# Patient Record
Sex: FEMALE | Race: ASIAN | NOT HISPANIC OR LATINO | URBAN - METROPOLITAN AREA
[De-identification: names, ages, dates, MRNs, and addresses within clinical notes are randomized per-mention and may not be internally consistent; named-entity substitution may affect disease eponyms.]

---

## 2018-04-05 ENCOUNTER — INPATIENT (INPATIENT)
Facility: HOSPITAL | Age: 68
LOS: 3 days | Discharge: HOME | DRG: 372 | End: 2018-04-09
Attending: SPECIALIST | Admitting: SPECIALIST
Payer: COMMERCIAL

## 2018-04-05 VITALS
RESPIRATION RATE: 16 BRPM | OXYGEN SATURATION: 97 % | WEIGHT: 145.06 LBS | SYSTOLIC BLOOD PRESSURE: 101 MMHG | TEMPERATURE: 98 F | HEART RATE: 84 BPM | DIASTOLIC BLOOD PRESSURE: 68 MMHG | HEIGHT: 62 IN

## 2018-04-05 DIAGNOSIS — Z90.49 ACQUIRED ABSENCE OF OTHER SPECIFIED PARTS OF DIGESTIVE TRACT: Chronic | ICD-10-CM

## 2018-04-05 DIAGNOSIS — K37 UNSPECIFIED APPENDICITIS: ICD-10-CM

## 2018-04-05 LAB
ALBUMIN SERPL ELPH-MCNC: 3.7 G/DL — SIGNIFICANT CHANGE UP (ref 3.5–5)
ALP SERPL-CCNC: 44 U/L — SIGNIFICANT CHANGE UP (ref 40–120)
ALT FLD-CCNC: 14 U/L DA — SIGNIFICANT CHANGE UP (ref 10–60)
ANION GAP SERPL CALC-SCNC: 8 MMOL/L — SIGNIFICANT CHANGE UP (ref 5–17)
APPEARANCE UR: CLEAR — SIGNIFICANT CHANGE UP
APTT BLD: 41.3 SEC — HIGH (ref 27.5–37.4)
AST SERPL-CCNC: 20 U/L — SIGNIFICANT CHANGE UP (ref 10–40)
BASOPHILS # BLD AUTO: 0.1 K/UL — SIGNIFICANT CHANGE UP (ref 0–0.2)
BASOPHILS NFR BLD AUTO: 1 % — SIGNIFICANT CHANGE UP (ref 0–2)
BILIRUB SERPL-MCNC: 0.4 MG/DL — SIGNIFICANT CHANGE UP (ref 0.2–1.2)
BILIRUB UR-MCNC: NEGATIVE — SIGNIFICANT CHANGE UP
BUN SERPL-MCNC: 34 MG/DL — HIGH (ref 7–18)
CALCIUM SERPL-MCNC: 10.3 MG/DL — SIGNIFICANT CHANGE UP (ref 8.4–10.5)
CHLORIDE SERPL-SCNC: 104 MMOL/L — SIGNIFICANT CHANGE UP (ref 96–108)
CO2 SERPL-SCNC: 24 MMOL/L — SIGNIFICANT CHANGE UP (ref 22–31)
COLOR SPEC: YELLOW — SIGNIFICANT CHANGE UP
CREAT SERPL-MCNC: 1.23 MG/DL — SIGNIFICANT CHANGE UP (ref 0.5–1.3)
DIFF PNL FLD: ABNORMAL
EOSINOPHIL # BLD AUTO: 0.1 K/UL — SIGNIFICANT CHANGE UP (ref 0–0.5)
EOSINOPHIL NFR BLD AUTO: 1.1 % — SIGNIFICANT CHANGE UP (ref 0–6)
GLUCOSE BLDC GLUCOMTR-MCNC: 131 MG/DL — HIGH (ref 70–99)
GLUCOSE BLDC GLUCOMTR-MCNC: 67 MG/DL — LOW (ref 70–99)
GLUCOSE BLDC GLUCOMTR-MCNC: 78 MG/DL — SIGNIFICANT CHANGE UP (ref 70–99)
GLUCOSE SERPL-MCNC: 90 MG/DL — SIGNIFICANT CHANGE UP (ref 70–99)
GLUCOSE UR QL: NEGATIVE — SIGNIFICANT CHANGE UP
HCT VFR BLD CALC: 39.7 % — SIGNIFICANT CHANGE UP (ref 34.5–45)
HGB BLD-MCNC: 12.3 G/DL — SIGNIFICANT CHANGE UP (ref 11.5–15.5)
INR BLD: 1.09 RATIO — SIGNIFICANT CHANGE UP (ref 0.88–1.16)
KETONES UR-MCNC: NEGATIVE — SIGNIFICANT CHANGE UP
LEUKOCYTE ESTERASE UR-ACNC: ABNORMAL
LIDOCAIN IGE QN: 209 U/L — SIGNIFICANT CHANGE UP (ref 73–393)
LYMPHOCYTES # BLD AUTO: 2.9 K/UL — SIGNIFICANT CHANGE UP (ref 1–3.3)
LYMPHOCYTES # BLD AUTO: 28.3 % — SIGNIFICANT CHANGE UP (ref 13–44)
MCHC RBC-ENTMCNC: 28.7 PG — SIGNIFICANT CHANGE UP (ref 27–34)
MCHC RBC-ENTMCNC: 31.1 GM/DL — LOW (ref 32–36)
MCV RBC AUTO: 92.3 FL — SIGNIFICANT CHANGE UP (ref 80–100)
MONOCYTES # BLD AUTO: 0.9 K/UL — SIGNIFICANT CHANGE UP (ref 0–0.9)
MONOCYTES NFR BLD AUTO: 8.6 % — SIGNIFICANT CHANGE UP (ref 2–14)
NEUTROPHILS # BLD AUTO: 6.3 K/UL — SIGNIFICANT CHANGE UP (ref 1.8–7.4)
NEUTROPHILS NFR BLD AUTO: 61 % — SIGNIFICANT CHANGE UP (ref 43–77)
NITRITE UR-MCNC: NEGATIVE — SIGNIFICANT CHANGE UP
PH UR: 5 — SIGNIFICANT CHANGE UP (ref 5–8)
PLATELET # BLD AUTO: 276 K/UL — SIGNIFICANT CHANGE UP (ref 150–400)
POTASSIUM SERPL-MCNC: 4.6 MMOL/L — SIGNIFICANT CHANGE UP (ref 3.5–5.3)
POTASSIUM SERPL-SCNC: 4.6 MMOL/L — SIGNIFICANT CHANGE UP (ref 3.5–5.3)
PROT SERPL-MCNC: 8.5 G/DL — HIGH (ref 6–8.3)
PROT UR-MCNC: 15
PROTHROM AB SERPL-ACNC: 11.9 SEC — SIGNIFICANT CHANGE UP (ref 9.8–12.7)
RBC # BLD: 4.3 M/UL — SIGNIFICANT CHANGE UP (ref 3.8–5.2)
RBC # FLD: 14 % — SIGNIFICANT CHANGE UP (ref 10.3–14.5)
SODIUM SERPL-SCNC: 136 MMOL/L — SIGNIFICANT CHANGE UP (ref 135–145)
SP GR SPEC: 1.01 — SIGNIFICANT CHANGE UP (ref 1.01–1.02)
UROBILINOGEN FLD QL: NEGATIVE — SIGNIFICANT CHANGE UP
WBC # BLD: 10.2 K/UL — SIGNIFICANT CHANGE UP (ref 3.8–10.5)
WBC # FLD AUTO: 10.2 K/UL — SIGNIFICANT CHANGE UP (ref 3.8–10.5)

## 2018-04-05 PROCEDURE — 99285 EMERGENCY DEPT VISIT HI MDM: CPT

## 2018-04-05 PROCEDURE — 99223 1ST HOSP IP/OBS HIGH 75: CPT

## 2018-04-05 PROCEDURE — 74177 CT ABD & PELVIS W/CONTRAST: CPT | Mod: 26

## 2018-04-05 RX ORDER — CIPROFLOXACIN LACTATE 400MG/40ML
400 VIAL (ML) INTRAVENOUS EVERY 12 HOURS
Qty: 0 | Refills: 0 | Status: DISCONTINUED | OUTPATIENT
Start: 2018-04-05 | End: 2018-04-09

## 2018-04-05 RX ORDER — SODIUM CHLORIDE 9 MG/ML
3 INJECTION INTRAMUSCULAR; INTRAVENOUS; SUBCUTANEOUS ONCE
Qty: 0 | Refills: 0 | Status: COMPLETED | OUTPATIENT
Start: 2018-04-05 | End: 2018-04-05

## 2018-04-05 RX ORDER — METRONIDAZOLE 500 MG
500 TABLET ORAL EVERY 8 HOURS
Qty: 0 | Refills: 0 | Status: DISCONTINUED | OUTPATIENT
Start: 2018-04-05 | End: 2018-04-09

## 2018-04-05 RX ORDER — PIPERACILLIN AND TAZOBACTAM 4; .5 G/20ML; G/20ML
3.38 INJECTION, POWDER, LYOPHILIZED, FOR SOLUTION INTRAVENOUS ONCE
Qty: 0 | Refills: 0 | Status: COMPLETED | OUTPATIENT
Start: 2018-04-05 | End: 2018-04-05

## 2018-04-05 RX ORDER — DEXTROSE 50 % IN WATER 50 %
25 SYRINGE (ML) INTRAVENOUS ONCE
Qty: 0 | Refills: 0 | Status: DISCONTINUED | OUTPATIENT
Start: 2018-04-05 | End: 2018-04-09

## 2018-04-05 RX ORDER — SODIUM CHLORIDE 9 MG/ML
1000 INJECTION, SOLUTION INTRAVENOUS
Qty: 0 | Refills: 0 | Status: DISCONTINUED | OUTPATIENT
Start: 2018-04-05 | End: 2018-04-09

## 2018-04-05 RX ORDER — DEXTROSE 50 % IN WATER 50 %
12.5 SYRINGE (ML) INTRAVENOUS ONCE
Qty: 0 | Refills: 0 | Status: COMPLETED | OUTPATIENT
Start: 2018-04-05 | End: 2018-04-05

## 2018-04-05 RX ORDER — DEXTROSE 50 % IN WATER 50 %
1 SYRINGE (ML) INTRAVENOUS ONCE
Qty: 0 | Refills: 0 | Status: DISCONTINUED | OUTPATIENT
Start: 2018-04-05 | End: 2018-04-09

## 2018-04-05 RX ORDER — BRIMONIDINE TARTRATE 2 MG/MG
1 SOLUTION/ DROPS OPHTHALMIC DAILY
Qty: 0 | Refills: 0 | Status: DISCONTINUED | OUTPATIENT
Start: 2018-04-05 | End: 2018-04-09

## 2018-04-05 RX ORDER — SIMVASTATIN 20 MG/1
10 TABLET, FILM COATED ORAL AT BEDTIME
Qty: 0 | Refills: 0 | Status: DISCONTINUED | OUTPATIENT
Start: 2018-04-05 | End: 2018-04-09

## 2018-04-05 RX ORDER — FENOFIBRATE,MICRONIZED 130 MG
48 CAPSULE ORAL AT BEDTIME
Qty: 0 | Refills: 0 | Status: DISCONTINUED | OUTPATIENT
Start: 2018-04-05 | End: 2018-04-09

## 2018-04-05 RX ORDER — TIMOLOL 0.5 %
1 DROPS OPHTHALMIC (EYE) DAILY
Qty: 0 | Refills: 0 | Status: DISCONTINUED | OUTPATIENT
Start: 2018-04-05 | End: 2018-04-09

## 2018-04-05 RX ORDER — GLUCAGON INJECTION, SOLUTION 0.5 MG/.1ML
1 INJECTION, SOLUTION SUBCUTANEOUS ONCE
Qty: 0 | Refills: 0 | Status: DISCONTINUED | OUTPATIENT
Start: 2018-04-05 | End: 2018-04-09

## 2018-04-05 RX ORDER — SODIUM CHLORIDE 9 MG/ML
1000 INJECTION INTRAMUSCULAR; INTRAVENOUS; SUBCUTANEOUS
Qty: 0 | Refills: 0 | Status: DISCONTINUED | OUTPATIENT
Start: 2018-04-05 | End: 2018-04-06

## 2018-04-05 RX ORDER — ACETAMINOPHEN 500 MG
1000 TABLET ORAL ONCE
Qty: 0 | Refills: 0 | Status: DISCONTINUED | OUTPATIENT
Start: 2018-04-05 | End: 2018-04-09

## 2018-04-05 RX ORDER — LOSARTAN POTASSIUM 100 MG/1
100 TABLET, FILM COATED ORAL DAILY
Qty: 0 | Refills: 0 | Status: DISCONTINUED | OUTPATIENT
Start: 2018-04-05 | End: 2018-04-09

## 2018-04-05 RX ORDER — BACLOFEN 100 %
10 POWDER (GRAM) MISCELLANEOUS
Qty: 0 | Refills: 0 | Status: DISCONTINUED | OUTPATIENT
Start: 2018-04-05 | End: 2018-04-09

## 2018-04-05 RX ORDER — DEXTROSE 50 % IN WATER 50 %
12.5 SYRINGE (ML) INTRAVENOUS ONCE
Qty: 0 | Refills: 0 | Status: DISCONTINUED | OUTPATIENT
Start: 2018-04-05 | End: 2018-04-09

## 2018-04-05 RX ORDER — LATANOPROST 0.05 MG/ML
1 SOLUTION/ DROPS OPHTHALMIC; TOPICAL AT BEDTIME
Qty: 0 | Refills: 0 | Status: DISCONTINUED | OUTPATIENT
Start: 2018-04-05 | End: 2018-04-09

## 2018-04-05 RX ORDER — METOPROLOL TARTRATE 50 MG
50 TABLET ORAL DAILY
Qty: 0 | Refills: 0 | Status: DISCONTINUED | OUTPATIENT
Start: 2018-04-05 | End: 2018-04-09

## 2018-04-05 RX ORDER — HEPARIN SODIUM 5000 [USP'U]/ML
5000 INJECTION INTRAVENOUS; SUBCUTANEOUS EVERY 8 HOURS
Qty: 0 | Refills: 0 | Status: DISCONTINUED | OUTPATIENT
Start: 2018-04-05 | End: 2018-04-09

## 2018-04-05 RX ORDER — INSULIN LISPRO 100/ML
VIAL (ML) SUBCUTANEOUS
Qty: 0 | Refills: 0 | Status: DISCONTINUED | OUTPATIENT
Start: 2018-04-05 | End: 2018-04-09

## 2018-04-05 RX ORDER — MORPHINE SULFATE 50 MG/1
2 CAPSULE, EXTENDED RELEASE ORAL EVERY 6 HOURS
Qty: 0 | Refills: 0 | Status: DISCONTINUED | OUTPATIENT
Start: 2018-04-05 | End: 2018-04-09

## 2018-04-05 RX ADMIN — SODIUM CHLORIDE 3 MILLILITER(S): 9 INJECTION INTRAMUSCULAR; INTRAVENOUS; SUBCUTANEOUS at 14:34

## 2018-04-05 RX ADMIN — SIMVASTATIN 10 MILLIGRAM(S): 20 TABLET, FILM COATED ORAL at 21:50

## 2018-04-05 RX ADMIN — Medication 50 MILLIGRAM(S): at 18:30

## 2018-04-05 RX ADMIN — Medication 100 MILLIGRAM(S): at 21:50

## 2018-04-05 RX ADMIN — BRIMONIDINE TARTRATE 1 DROP(S): 2 SOLUTION/ DROPS OPHTHALMIC at 21:50

## 2018-04-05 RX ADMIN — Medication 12.5 GRAM(S): at 18:23

## 2018-04-05 RX ADMIN — SODIUM CHLORIDE 50 MILLILITER(S): 9 INJECTION, SOLUTION INTRAVENOUS at 23:07

## 2018-04-05 RX ADMIN — PIPERACILLIN AND TAZOBACTAM 200 GRAM(S): 4; .5 INJECTION, POWDER, LYOPHILIZED, FOR SOLUTION INTRAVENOUS at 15:28

## 2018-04-05 RX ADMIN — Medication 48 MILLIGRAM(S): at 21:50

## 2018-04-05 RX ADMIN — Medication 1 DROP(S): at 21:51

## 2018-04-05 RX ADMIN — SODIUM CHLORIDE 110 MILLILITER(S): 9 INJECTION INTRAMUSCULAR; INTRAVENOUS; SUBCUTANEOUS at 17:57

## 2018-04-05 RX ADMIN — Medication 200 MILLIGRAM(S): at 19:02

## 2018-04-05 RX ADMIN — LATANOPROST 1 DROP(S): 0.05 SOLUTION/ DROPS OPHTHALMIC; TOPICAL at 21:51

## 2018-04-05 RX ADMIN — HEPARIN SODIUM 5000 UNIT(S): 5000 INJECTION INTRAVENOUS; SUBCUTANEOUS at 21:50

## 2018-04-05 NOTE — ED ADULT TRIAGE NOTE - CHIEF COMPLAINT QUOTE
Sent by PMD with c/o RLQ abdominal pain r/o appendicitis. Pt reports abdominal pain on/off since Sunday

## 2018-04-05 NOTE — ED ADULT NURSE NOTE - OBJECTIVE STATEMENT
pt from home c/o of Rt lower abdominal pain with diarrhea x5 days pt is alert awake oriented x3 no active nausea/vomiting

## 2018-04-05 NOTE — H&P ADULT - NEGATIVE GENERAL SYMPTOMS
no chills/no sweating/no malaise/no weight loss/no polyphagia/no fatigue/no fever/no weight gain/no polyuria/no polydipsia

## 2018-04-05 NOTE — H&P ADULT - HISTORY OF PRESENT ILLNESS
Ms Herrera is a 68 y/o female with past medical history of HTN, HLD, DM presents to the ED after PCP visit yesterday with c/o RLQ abdominal pain for the past 4 days. Pt describes the pain as sharp, 8/10 and radiating to the right flank region. She states the pain is constant throughout the day and denies any alleviating or aggregating factors. Pt has taken tylenol with no relief.  Associated symptoms include three episodes of non bloody watery diarrhea, nausea and anorexia. Pt denies any fever, headache, chest pain, sob, or urinary complaints. Last colonoscopy 2001, pt reports it was negative

## 2018-04-05 NOTE — H&P ADULT - ASSESSMENT
68 y/o female with PMH of HTN, HLD and DM diagnosed with appendicitis with abscess on CT scan. Thickened cecum with questionable mass.  With the presence of an appendiceal abscess will treat conservatively with antibiotics    - Admit to Surgery  - NPO  -IV antibiotics   -Pain Control  -DVT prophylaxis   -GI prophylaxis  -Incentive Spirometry   -AM labs  -restart home meds, except diabetic medications  - insulin sliding scale with fingersticks q6  -Repeat CT abdomen with contrast on Monday

## 2018-04-05 NOTE — ED PROVIDER NOTE - OBJECTIVE STATEMENT
66 y/o female with hx of HTN and DM present to ED after visit to PCP for RLQ pain for 4 days.  Endorses anorexia, non-melanotic diarrhea, nausea, right flank pain.  No  complaints.  PCP concern for APPY, sent to ED for eval.

## 2018-04-05 NOTE — ED PROVIDER NOTE - ATTENDING CONTRIBUTION TO CARE
66 y/o with RLQ pain for four days with associated anorexia, diarrhea and nausea.  Concern for APPY.  Check labs and imaging. Pain control.

## 2018-04-05 NOTE — ED PROVIDER NOTE - MEDICAL DECISION MAKING DETAILS
68 y/o with RLQ pain for four days with associated anorexia, diarrhea and nausea.  Concern for APPY.  Check labs and imaging. Pain control.

## 2018-04-05 NOTE — ED PROVIDER NOTE - PROGRESS NOTE DETAILS
Acute appy on CT.  S/W surgery PA, to be admitted to Dr Powell for surgical management.  First dose of Zosyn in ED.

## 2018-04-05 NOTE — H&P ADULT - FAMILY HISTORY
Father  Still living? Unknown  Family history of diabetes mellitus, Age at diagnosis: Age Unknown     Mother  Still living? Unknown  Family history of diabetes mellitus, Age at diagnosis: Age Unknown     Aunt  Still living? Unknown  Family history of breast cancer in female, Age at diagnosis: Age Unknown

## 2018-04-06 LAB
ANION GAP SERPL CALC-SCNC: 9 MMOL/L — SIGNIFICANT CHANGE UP (ref 5–17)
BASOPHILS # BLD AUTO: 0.1 K/UL — SIGNIFICANT CHANGE UP (ref 0–0.2)
BASOPHILS NFR BLD AUTO: 0.9 % — SIGNIFICANT CHANGE UP (ref 0–2)
BUN SERPL-MCNC: 19 MG/DL — HIGH (ref 7–18)
CALCIUM SERPL-MCNC: 9.2 MG/DL — SIGNIFICANT CHANGE UP (ref 8.4–10.5)
CANCER AG125 SERPL-ACNC: 26 U/ML — SIGNIFICANT CHANGE UP
CHLORIDE SERPL-SCNC: 103 MMOL/L — SIGNIFICANT CHANGE UP (ref 96–108)
CO2 SERPL-SCNC: 25 MMOL/L — SIGNIFICANT CHANGE UP (ref 22–31)
CREAT SERPL-MCNC: 0.65 MG/DL — SIGNIFICANT CHANGE UP (ref 0.5–1.3)
EOSINOPHIL # BLD AUTO: 0.1 K/UL — SIGNIFICANT CHANGE UP (ref 0–0.5)
EOSINOPHIL NFR BLD AUTO: 1.9 % — SIGNIFICANT CHANGE UP (ref 0–6)
GLUCOSE BLDC GLUCOMTR-MCNC: 102 MG/DL — HIGH (ref 70–99)
GLUCOSE BLDC GLUCOMTR-MCNC: 108 MG/DL — HIGH (ref 70–99)
GLUCOSE BLDC GLUCOMTR-MCNC: 93 MG/DL — SIGNIFICANT CHANGE UP (ref 70–99)
GLUCOSE SERPL-MCNC: 92 MG/DL — SIGNIFICANT CHANGE UP (ref 70–99)
HBA1C BLD-MCNC: 5.6 % — SIGNIFICANT CHANGE UP (ref 4–5.6)
HCT VFR BLD CALC: 34.4 % — LOW (ref 34.5–45)
HGB BLD-MCNC: 10.8 G/DL — LOW (ref 11.5–15.5)
LYMPHOCYTES # BLD AUTO: 1.9 K/UL — SIGNIFICANT CHANGE UP (ref 1–3.3)
LYMPHOCYTES # BLD AUTO: 29.8 % — SIGNIFICANT CHANGE UP (ref 13–44)
MCHC RBC-ENTMCNC: 28.9 PG — SIGNIFICANT CHANGE UP (ref 27–34)
MCHC RBC-ENTMCNC: 31.2 GM/DL — LOW (ref 32–36)
MCV RBC AUTO: 92.4 FL — SIGNIFICANT CHANGE UP (ref 80–100)
MONOCYTES # BLD AUTO: 0.6 K/UL — SIGNIFICANT CHANGE UP (ref 0–0.9)
MONOCYTES NFR BLD AUTO: 10.4 % — SIGNIFICANT CHANGE UP (ref 2–14)
NEUTROPHILS # BLD AUTO: 3.6 K/UL — SIGNIFICANT CHANGE UP (ref 1.8–7.4)
NEUTROPHILS NFR BLD AUTO: 57.1 % — SIGNIFICANT CHANGE UP (ref 43–77)
PLATELET # BLD AUTO: 216 K/UL — SIGNIFICANT CHANGE UP (ref 150–400)
POTASSIUM SERPL-MCNC: 3.8 MMOL/L — SIGNIFICANT CHANGE UP (ref 3.5–5.3)
POTASSIUM SERPL-SCNC: 3.8 MMOL/L — SIGNIFICANT CHANGE UP (ref 3.5–5.3)
RBC # BLD: 3.72 M/UL — LOW (ref 3.8–5.2)
RBC # FLD: 13.8 % — SIGNIFICANT CHANGE UP (ref 10.3–14.5)
SODIUM SERPL-SCNC: 137 MMOL/L — SIGNIFICANT CHANGE UP (ref 135–145)
WBC # BLD: 6.3 K/UL — SIGNIFICANT CHANGE UP (ref 3.8–10.5)
WBC # FLD AUTO: 6.3 K/UL — SIGNIFICANT CHANGE UP (ref 3.8–10.5)

## 2018-04-06 PROCEDURE — 99232 SBSQ HOSP IP/OBS MODERATE 35: CPT

## 2018-04-06 RX ORDER — SODIUM CHLORIDE 9 MG/ML
1000 INJECTION, SOLUTION INTRAVENOUS
Qty: 0 | Refills: 0 | Status: DISCONTINUED | OUTPATIENT
Start: 2018-04-06 | End: 2018-04-09

## 2018-04-06 RX ADMIN — Medication 200 MILLIGRAM(S): at 05:21

## 2018-04-06 RX ADMIN — Medication 48 MILLIGRAM(S): at 22:15

## 2018-04-06 RX ADMIN — HEPARIN SODIUM 5000 UNIT(S): 5000 INJECTION INTRAVENOUS; SUBCUTANEOUS at 13:44

## 2018-04-06 RX ADMIN — Medication 1 DROP(S): at 12:00

## 2018-04-06 RX ADMIN — SIMVASTATIN 10 MILLIGRAM(S): 20 TABLET, FILM COATED ORAL at 22:15

## 2018-04-06 RX ADMIN — Medication 200 MILLIGRAM(S): at 17:37

## 2018-04-06 RX ADMIN — HEPARIN SODIUM 5000 UNIT(S): 5000 INJECTION INTRAVENOUS; SUBCUTANEOUS at 22:14

## 2018-04-06 RX ADMIN — Medication 100 MILLIGRAM(S): at 13:43

## 2018-04-06 RX ADMIN — Medication 50 MILLIGRAM(S): at 17:37

## 2018-04-06 RX ADMIN — HEPARIN SODIUM 5000 UNIT(S): 5000 INJECTION INTRAVENOUS; SUBCUTANEOUS at 05:22

## 2018-04-06 RX ADMIN — Medication 100 MILLIGRAM(S): at 22:13

## 2018-04-06 RX ADMIN — SODIUM CHLORIDE 110 MILLILITER(S): 9 INJECTION, SOLUTION INTRAVENOUS at 13:44

## 2018-04-06 RX ADMIN — BRIMONIDINE TARTRATE 1 DROP(S): 2 SOLUTION/ DROPS OPHTHALMIC at 12:00

## 2018-04-06 RX ADMIN — Medication 50 MILLIGRAM(S): at 05:21

## 2018-04-06 RX ADMIN — Medication 100 MILLIGRAM(S): at 05:21

## 2018-04-06 RX ADMIN — LATANOPROST 1 DROP(S): 0.05 SOLUTION/ DROPS OPHTHALMIC; TOPICAL at 22:15

## 2018-04-07 LAB
CANCER AG19-9 SERPL-ACNC: <1 U/ML — SIGNIFICANT CHANGE UP
CEA SERPL-MCNC: 1.6 NG/ML — SIGNIFICANT CHANGE UP (ref 0–3.8)
GLUCOSE BLDC GLUCOMTR-MCNC: 100 MG/DL — HIGH (ref 70–99)
GLUCOSE BLDC GLUCOMTR-MCNC: 120 MG/DL — HIGH (ref 70–99)
GLUCOSE BLDC GLUCOMTR-MCNC: 120 MG/DL — HIGH (ref 70–99)
GLUCOSE BLDC GLUCOMTR-MCNC: 130 MG/DL — HIGH (ref 70–99)
GLUCOSE BLDC GLUCOMTR-MCNC: 135 MG/DL — HIGH (ref 70–99)
GLUCOSE BLDC GLUCOMTR-MCNC: 84 MG/DL — SIGNIFICANT CHANGE UP (ref 70–99)

## 2018-04-07 PROCEDURE — 99232 SBSQ HOSP IP/OBS MODERATE 35: CPT

## 2018-04-07 RX ADMIN — Medication 200 MILLIGRAM(S): at 05:14

## 2018-04-07 RX ADMIN — HEPARIN SODIUM 5000 UNIT(S): 5000 INJECTION INTRAVENOUS; SUBCUTANEOUS at 14:01

## 2018-04-07 RX ADMIN — Medication 100 MILLIGRAM(S): at 13:56

## 2018-04-07 RX ADMIN — Medication 200 MILLIGRAM(S): at 17:51

## 2018-04-07 RX ADMIN — BRIMONIDINE TARTRATE 1 DROP(S): 2 SOLUTION/ DROPS OPHTHALMIC at 13:56

## 2018-04-07 RX ADMIN — Medication 1 DROP(S): at 13:57

## 2018-04-07 RX ADMIN — Medication 50 MILLIGRAM(S): at 05:20

## 2018-04-07 RX ADMIN — LATANOPROST 1 DROP(S): 0.05 SOLUTION/ DROPS OPHTHALMIC; TOPICAL at 21:10

## 2018-04-07 RX ADMIN — HEPARIN SODIUM 5000 UNIT(S): 5000 INJECTION INTRAVENOUS; SUBCUTANEOUS at 21:11

## 2018-04-07 RX ADMIN — Medication 100 MILLIGRAM(S): at 05:15

## 2018-04-07 RX ADMIN — Medication 100 MILLIGRAM(S): at 21:11

## 2018-04-07 RX ADMIN — HEPARIN SODIUM 5000 UNIT(S): 5000 INJECTION INTRAVENOUS; SUBCUTANEOUS at 05:15

## 2018-04-07 RX ADMIN — Medication 50 MILLIGRAM(S): at 17:51

## 2018-04-07 RX ADMIN — SIMVASTATIN 10 MILLIGRAM(S): 20 TABLET, FILM COATED ORAL at 21:10

## 2018-04-07 RX ADMIN — LOSARTAN POTASSIUM 100 MILLIGRAM(S): 100 TABLET, FILM COATED ORAL at 05:15

## 2018-04-07 RX ADMIN — Medication 48 MILLIGRAM(S): at 21:10

## 2018-04-07 RX ADMIN — Medication 50 MILLIGRAM(S): at 05:15

## 2018-04-08 DIAGNOSIS — K37 UNSPECIFIED APPENDICITIS: ICD-10-CM

## 2018-04-08 LAB
ANION GAP SERPL CALC-SCNC: 8 MMOL/L — SIGNIFICANT CHANGE UP (ref 5–17)
BASOPHILS # BLD AUTO: 0.1 K/UL — SIGNIFICANT CHANGE UP (ref 0–0.2)
BASOPHILS NFR BLD AUTO: 1.1 % — SIGNIFICANT CHANGE UP (ref 0–2)
BUN SERPL-MCNC: 4 MG/DL — LOW (ref 7–18)
CALCIUM SERPL-MCNC: 9 MG/DL — SIGNIFICANT CHANGE UP (ref 8.4–10.5)
CHLORIDE SERPL-SCNC: 109 MMOL/L — HIGH (ref 96–108)
CO2 SERPL-SCNC: 25 MMOL/L — SIGNIFICANT CHANGE UP (ref 22–31)
CREAT SERPL-MCNC: 0.69 MG/DL — SIGNIFICANT CHANGE UP (ref 0.5–1.3)
EOSINOPHIL # BLD AUTO: 0.1 K/UL — SIGNIFICANT CHANGE UP (ref 0–0.5)
EOSINOPHIL NFR BLD AUTO: 1.7 % — SIGNIFICANT CHANGE UP (ref 0–6)
GLUCOSE BLDC GLUCOMTR-MCNC: 107 MG/DL — HIGH (ref 70–99)
GLUCOSE BLDC GLUCOMTR-MCNC: 90 MG/DL — SIGNIFICANT CHANGE UP (ref 70–99)
GLUCOSE BLDC GLUCOMTR-MCNC: 91 MG/DL — SIGNIFICANT CHANGE UP (ref 70–99)
GLUCOSE BLDC GLUCOMTR-MCNC: 97 MG/DL — SIGNIFICANT CHANGE UP (ref 70–99)
GLUCOSE SERPL-MCNC: 98 MG/DL — SIGNIFICANT CHANGE UP (ref 70–99)
HCT VFR BLD CALC: 34.5 % — SIGNIFICANT CHANGE UP (ref 34.5–45)
HGB BLD-MCNC: 10.7 G/DL — LOW (ref 11.5–15.5)
LYMPHOCYTES # BLD AUTO: 2.1 K/UL — SIGNIFICANT CHANGE UP (ref 1–3.3)
LYMPHOCYTES # BLD AUTO: 27.1 % — SIGNIFICANT CHANGE UP (ref 13–44)
MCHC RBC-ENTMCNC: 28.6 PG — SIGNIFICANT CHANGE UP (ref 27–34)
MCHC RBC-ENTMCNC: 31.1 GM/DL — LOW (ref 32–36)
MCV RBC AUTO: 91.8 FL — SIGNIFICANT CHANGE UP (ref 80–100)
MONOCYTES # BLD AUTO: 0.8 K/UL — SIGNIFICANT CHANGE UP (ref 0–0.9)
MONOCYTES NFR BLD AUTO: 10.5 % — SIGNIFICANT CHANGE UP (ref 2–14)
NEUTROPHILS # BLD AUTO: 4.6 K/UL — SIGNIFICANT CHANGE UP (ref 1.8–7.4)
NEUTROPHILS NFR BLD AUTO: 59.6 % — SIGNIFICANT CHANGE UP (ref 43–77)
PLATELET # BLD AUTO: 236 K/UL — SIGNIFICANT CHANGE UP (ref 150–400)
POTASSIUM SERPL-MCNC: 3.6 MMOL/L — SIGNIFICANT CHANGE UP (ref 3.5–5.3)
POTASSIUM SERPL-SCNC: 3.6 MMOL/L — SIGNIFICANT CHANGE UP (ref 3.5–5.3)
RBC # BLD: 3.76 M/UL — LOW (ref 3.8–5.2)
RBC # FLD: 13.5 % — SIGNIFICANT CHANGE UP (ref 10.3–14.5)
SODIUM SERPL-SCNC: 142 MMOL/L — SIGNIFICANT CHANGE UP (ref 135–145)
WBC # BLD: 7.8 K/UL — SIGNIFICANT CHANGE UP (ref 3.8–10.5)
WBC # FLD AUTO: 7.8 K/UL — SIGNIFICANT CHANGE UP (ref 3.8–10.5)

## 2018-04-08 PROCEDURE — 99232 SBSQ HOSP IP/OBS MODERATE 35: CPT

## 2018-04-08 RX ADMIN — Medication 100 MILLIGRAM(S): at 05:26

## 2018-04-08 RX ADMIN — Medication 1 DROP(S): at 12:15

## 2018-04-08 RX ADMIN — Medication 100 MILLIGRAM(S): at 14:44

## 2018-04-08 RX ADMIN — LOSARTAN POTASSIUM 100 MILLIGRAM(S): 100 TABLET, FILM COATED ORAL at 05:26

## 2018-04-08 RX ADMIN — HEPARIN SODIUM 5000 UNIT(S): 5000 INJECTION INTRAVENOUS; SUBCUTANEOUS at 14:44

## 2018-04-08 RX ADMIN — SIMVASTATIN 10 MILLIGRAM(S): 20 TABLET, FILM COATED ORAL at 21:19

## 2018-04-08 RX ADMIN — LATANOPROST 1 DROP(S): 0.05 SOLUTION/ DROPS OPHTHALMIC; TOPICAL at 21:20

## 2018-04-08 RX ADMIN — Medication 50 MILLIGRAM(S): at 18:08

## 2018-04-08 RX ADMIN — HEPARIN SODIUM 5000 UNIT(S): 5000 INJECTION INTRAVENOUS; SUBCUTANEOUS at 21:20

## 2018-04-08 RX ADMIN — Medication 50 MILLIGRAM(S): at 05:26

## 2018-04-08 RX ADMIN — HEPARIN SODIUM 5000 UNIT(S): 5000 INJECTION INTRAVENOUS; SUBCUTANEOUS at 05:26

## 2018-04-08 RX ADMIN — SODIUM CHLORIDE 110 MILLILITER(S): 9 INJECTION, SOLUTION INTRAVENOUS at 14:44

## 2018-04-08 RX ADMIN — Medication 200 MILLIGRAM(S): at 17:55

## 2018-04-08 RX ADMIN — Medication 200 MILLIGRAM(S): at 05:26

## 2018-04-08 RX ADMIN — Medication 48 MILLIGRAM(S): at 21:20

## 2018-04-08 RX ADMIN — Medication 100 MILLIGRAM(S): at 21:20

## 2018-04-08 RX ADMIN — BRIMONIDINE TARTRATE 1 DROP(S): 2 SOLUTION/ DROPS OPHTHALMIC at 12:15

## 2018-04-08 NOTE — DIETITIAN INITIAL EVALUATION ADULT. - MD RECOMMEND
Advance diet pending surgical decision, if diet cannot be advanced, consider nutrition support , in the interim, add ensure clear to clear liquid diet

## 2018-04-09 ENCOUNTER — TRANSCRIPTION ENCOUNTER (OUTPATIENT)
Age: 68
End: 2018-04-09

## 2018-04-09 VITALS
RESPIRATION RATE: 16 BRPM | TEMPERATURE: 97 F | OXYGEN SATURATION: 99 % | SYSTOLIC BLOOD PRESSURE: 108 MMHG | DIASTOLIC BLOOD PRESSURE: 68 MMHG | HEART RATE: 66 BPM

## 2018-04-09 DIAGNOSIS — I10 ESSENTIAL (PRIMARY) HYPERTENSION: ICD-10-CM

## 2018-04-09 DIAGNOSIS — E11.9 TYPE 2 DIABETES MELLITUS WITHOUT COMPLICATIONS: ICD-10-CM

## 2018-04-09 LAB
ANION GAP SERPL CALC-SCNC: 9 MMOL/L — SIGNIFICANT CHANGE UP (ref 5–17)
BUN SERPL-MCNC: 4 MG/DL — LOW (ref 7–18)
CALCIUM SERPL-MCNC: 9.1 MG/DL — SIGNIFICANT CHANGE UP (ref 8.4–10.5)
CHLORIDE SERPL-SCNC: 106 MMOL/L — SIGNIFICANT CHANGE UP (ref 96–108)
CO2 SERPL-SCNC: 24 MMOL/L — SIGNIFICANT CHANGE UP (ref 22–31)
CREAT SERPL-MCNC: 0.7 MG/DL — SIGNIFICANT CHANGE UP (ref 0.5–1.3)
GLUCOSE BLDC GLUCOMTR-MCNC: 122 MG/DL — HIGH (ref 70–99)
GLUCOSE BLDC GLUCOMTR-MCNC: 127 MG/DL — HIGH (ref 70–99)
GLUCOSE BLDC GLUCOMTR-MCNC: 85 MG/DL — SIGNIFICANT CHANGE UP (ref 70–99)
GLUCOSE SERPL-MCNC: 132 MG/DL — HIGH (ref 70–99)
HCT VFR BLD CALC: 34.6 % — SIGNIFICANT CHANGE UP (ref 34.5–45)
HGB BLD-MCNC: 11.1 G/DL — LOW (ref 11.5–15.5)
MCHC RBC-ENTMCNC: 29.3 PG — SIGNIFICANT CHANGE UP (ref 27–34)
MCHC RBC-ENTMCNC: 32.1 GM/DL — SIGNIFICANT CHANGE UP (ref 32–36)
MCV RBC AUTO: 91 FL — SIGNIFICANT CHANGE UP (ref 80–100)
PLATELET # BLD AUTO: 261 K/UL — SIGNIFICANT CHANGE UP (ref 150–400)
POTASSIUM SERPL-MCNC: 3.2 MMOL/L — LOW (ref 3.5–5.3)
POTASSIUM SERPL-SCNC: 3.2 MMOL/L — LOW (ref 3.5–5.3)
RBC # BLD: 3.8 M/UL — SIGNIFICANT CHANGE UP (ref 3.8–5.2)
RBC # FLD: 13.5 % — SIGNIFICANT CHANGE UP (ref 10.3–14.5)
SODIUM SERPL-SCNC: 139 MMOL/L — SIGNIFICANT CHANGE UP (ref 135–145)
WBC # BLD: 8.1 K/UL — SIGNIFICANT CHANGE UP (ref 3.8–10.5)
WBC # FLD AUTO: 8.1 K/UL — SIGNIFICANT CHANGE UP (ref 3.8–10.5)

## 2018-04-09 PROCEDURE — 96375 TX/PRO/DX INJ NEW DRUG ADDON: CPT

## 2018-04-09 PROCEDURE — 86304 IMMUNOASSAY TUMOR CA 125: CPT

## 2018-04-09 PROCEDURE — 86900 BLOOD TYPING SEROLOGIC ABO: CPT

## 2018-04-09 PROCEDURE — 86901 BLOOD TYPING SEROLOGIC RH(D): CPT

## 2018-04-09 PROCEDURE — 80048 BASIC METABOLIC PNL TOTAL CA: CPT

## 2018-04-09 PROCEDURE — 80053 COMPREHEN METABOLIC PANEL: CPT

## 2018-04-09 PROCEDURE — 85027 COMPLETE CBC AUTOMATED: CPT

## 2018-04-09 PROCEDURE — 83036 HEMOGLOBIN GLYCOSYLATED A1C: CPT

## 2018-04-09 PROCEDURE — 99285 EMERGENCY DEPT VISIT HI MDM: CPT | Mod: 25

## 2018-04-09 PROCEDURE — 82962 GLUCOSE BLOOD TEST: CPT

## 2018-04-09 PROCEDURE — 74177 CT ABD & PELVIS W/CONTRAST: CPT | Mod: 26

## 2018-04-09 PROCEDURE — 86850 RBC ANTIBODY SCREEN: CPT

## 2018-04-09 PROCEDURE — 85610 PROTHROMBIN TIME: CPT

## 2018-04-09 PROCEDURE — 96374 THER/PROPH/DIAG INJ IV PUSH: CPT

## 2018-04-09 PROCEDURE — 85730 THROMBOPLASTIN TIME PARTIAL: CPT

## 2018-04-09 PROCEDURE — 74177 CT ABD & PELVIS W/CONTRAST: CPT

## 2018-04-09 PROCEDURE — 81001 URINALYSIS AUTO W/SCOPE: CPT

## 2018-04-09 PROCEDURE — 82378 CARCINOEMBRYONIC ANTIGEN: CPT

## 2018-04-09 PROCEDURE — 83690 ASSAY OF LIPASE: CPT

## 2018-04-09 PROCEDURE — 86301 IMMUNOASSAY TUMOR CA 19-9: CPT

## 2018-04-09 PROCEDURE — 99232 SBSQ HOSP IP/OBS MODERATE 35: CPT

## 2018-04-09 PROCEDURE — 93005 ELECTROCARDIOGRAM TRACING: CPT

## 2018-04-09 RX ORDER — FENOFIBRATE,MICRONIZED 130 MG
1 CAPSULE ORAL
Qty: 0 | Refills: 0 | DISCHARGE
Start: 2018-04-09

## 2018-04-09 RX ORDER — POTASSIUM CHLORIDE 20 MEQ
20 PACKET (EA) ORAL ONCE
Qty: 0 | Refills: 0 | Status: COMPLETED | OUTPATIENT
Start: 2018-04-09 | End: 2018-04-09

## 2018-04-09 RX ORDER — METRONIDAZOLE 500 MG
1 TABLET ORAL
Qty: 15 | Refills: 0
Start: 2018-04-09 | End: 2018-04-13

## 2018-04-09 RX ORDER — BRIMONIDINE TARTRATE 2 MG/MG
1 SOLUTION/ DROPS OPHTHALMIC
Qty: 0 | Refills: 0 | DISCHARGE
Start: 2018-04-09

## 2018-04-09 RX ORDER — LOSARTAN POTASSIUM 100 MG/1
1 TABLET, FILM COATED ORAL
Qty: 30 | Refills: 0
Start: 2018-04-09 | End: 2018-05-08

## 2018-04-09 RX ORDER — CEFUROXIME AXETIL 250 MG
2 TABLET ORAL
Qty: 20 | Refills: 0
Start: 2018-04-09 | End: 2018-04-13

## 2018-04-09 RX ORDER — BACLOFEN 100 %
1 POWDER (GRAM) MISCELLANEOUS
Qty: 0 | Refills: 0 | DISCHARGE
Start: 2018-04-09

## 2018-04-09 RX ORDER — LATANOPROST 0.05 MG/ML
1 SOLUTION/ DROPS OPHTHALMIC; TOPICAL
Qty: 0 | Refills: 0 | DISCHARGE
Start: 2018-04-09

## 2018-04-09 RX ORDER — METOPROLOL TARTRATE 50 MG
1 TABLET ORAL
Qty: 0 | Refills: 0 | DISCHARGE
Start: 2018-04-09

## 2018-04-09 RX ORDER — SIMVASTATIN 20 MG/1
1 TABLET, FILM COATED ORAL
Qty: 0 | Refills: 0 | DISCHARGE
Start: 2018-04-09

## 2018-04-09 RX ADMIN — HEPARIN SODIUM 5000 UNIT(S): 5000 INJECTION INTRAVENOUS; SUBCUTANEOUS at 14:29

## 2018-04-09 RX ADMIN — HEPARIN SODIUM 5000 UNIT(S): 5000 INJECTION INTRAVENOUS; SUBCUTANEOUS at 05:42

## 2018-04-09 RX ADMIN — Medication 50 MILLIGRAM(S): at 05:42

## 2018-04-09 RX ADMIN — Medication 200 MILLIGRAM(S): at 05:43

## 2018-04-09 RX ADMIN — BRIMONIDINE TARTRATE 1 DROP(S): 2 SOLUTION/ DROPS OPHTHALMIC at 11:55

## 2018-04-09 RX ADMIN — Medication 20 MILLIEQUIVALENT(S): at 11:55

## 2018-04-09 RX ADMIN — Medication 100 MILLIGRAM(S): at 14:28

## 2018-04-09 RX ADMIN — Medication 1 DROP(S): at 11:55

## 2018-04-09 RX ADMIN — Medication 100 MILLIGRAM(S): at 05:43

## 2018-04-09 RX ADMIN — LOSARTAN POTASSIUM 100 MILLIGRAM(S): 100 TABLET, FILM COATED ORAL at 05:42

## 2018-04-09 NOTE — CONSULT NOTE ADULT - SUBJECTIVE AND OBJECTIVE BOX
HPI:  Ms Herrera is a 68 y/o female with past medical history of HTN, HLD, DM presents to the ED after PCP visit yesterday with c/o RLQ abdominal pain for the past 4 days. Pt describes the pain as sharp, 8/10 and radiating to the right flank region. She states the pain is constant throughout the day and denies any alleviating or aggregating factors. Pt has taken tylenol with no relief.  Associated symptoms include three episodes of non bloody watery diarrhea, nausea and anorexia. Pt denies any fever, headache, chest pain, sob, or urinary complaints. Last colonoscopy 2001, pt reports it was negative (05 Apr 2018 16:22)      PAST MEDICAL & SURGICAL HISTORY:  Hyperlipidemia  Diabetes  Essential hypertension  S/P cholecystectomy      No Known Allergies      Meds:  acetaminophen  IVPB. 1000 milliGRAM(s) IV Intermittent once  baclofen 10 milliGRAM(s) Oral two times a day PRN  brimonidine 0.2% Ophthalmic Solution 1 Drop(s) Both EYES daily  ciprofloxacin   IVPB 400 milliGRAM(s) IV Intermittent every 12 hours  dextrose 5% + sodium chloride 0.9%. 1000 milliLiter(s) IV Continuous <Continuous>  dextrose 5%. 1000 milliLiter(s) IV Continuous <Continuous>  dextrose 5%. 1000 milliLiter(s) IV Continuous <Continuous>  dextrose 50% Injectable 12.5 Gram(s) IV Push once  dextrose 50% Injectable 25 Gram(s) IV Push once  dextrose 50% Injectable 25 Gram(s) IV Push once  dextrose Gel 1 Dose(s) Oral once PRN  fenofibrate Tablet 48 milliGRAM(s) Oral at bedtime  glucagon  Injectable 1 milliGRAM(s) IntraMuscular once PRN  heparin  Injectable 5000 Unit(s) SubCutaneous every 8 hours  insulin lispro (HumaLOG) corrective regimen sliding scale   SubCutaneous three times a day before meals  latanoprost 0.005% Ophthalmic Solution 1 Drop(s) Both EYES at bedtime  losartan 100 milliGRAM(s) Oral daily  metoprolol succinate ER 50 milliGRAM(s) Oral daily  metroNIDAZOLE  IVPB 500 milliGRAM(s) IV Intermittent every 8 hours  morphine  - Injectable 2 milliGRAM(s) IV Push every 6 hours PRN  pregabalin 50 milliGRAM(s) Oral two times a day  simvastatin 10 milliGRAM(s) Oral at bedtime  timolol 0.5% Solution 1 Drop(s) Both EYES daily      SOCIAL HISTORY:  Smoker:  YES / NO        PACK YEARS:                         WHEN QUIT?  ETOH use:  YES / NO               FREQUENCY / QUANTITY:  Ilicit Drug use:  YES / NO  Occupation:  Assisted device use (Cane / Walker):  Live with:    FAMILY HISTORY:  Family history of breast cancer in female (Aunt)  Family history of diabetes mellitus (Father, Mother)      VITALS:  Vital Signs Last 24 Hrs  T(C): 36.8 (09 Apr 2018 05:34), Max: 36.8 (09 Apr 2018 05:34)  T(F): 98.2 (09 Apr 2018 05:34), Max: 98.2 (09 Apr 2018 05:34)  HR: 71 (09 Apr 2018 05:34) (68 - 71)  BP: 120/70 (09 Apr 2018 05:34) (120/70 - 141/78)  BP(mean): --  RR: 16 (09 Apr 2018 05:34) (16 - 18)  SpO2: 94% (09 Apr 2018 05:34) (94% - 100%)    LABS/DIAGNOSTIC TESTS:                          11.1   8.1   )-----------( 261      ( 09 Apr 2018 08:32 )             34.6     WBC Count: 8.1 K/uL (04-09 @ 08:32)  WBC Count: 7.8 K/uL (04-08 @ 06:29)      04-09    139  |  106  |  4<L>  ----------------------------<  132<H>  3.2<L>   |  24  |  0.70    Ca    9.1      09 Apr 2018 08:32                    LACTATE:    ABG -     CULTURES:       RADIOLOGY:      ROS  [  ] UNABLE TO ELICIT HPI:  Ms Herrear is a 68 y/o female with past medical history of HTN, HLD, DM presents to the ED after PCP visit 5 days ago  with c/o RLQ abdominal pain with some nausea and 3 episodes of loose stools. she was found to have acute appendicitis without perforation but had significant caecal inflammation and so appendctomy not done and treated conservatively with IV abxs and has done well, a repeat CT abdomen showed less inflammatory changes but showed some left sided diverticulitis which is new. The patient is doing much better clinically with less abdominal pain, some loose stools but no nausea or vomiting and almost no abdominal pain. she is scheduled to go home today and to get an interval appendectomy as an outpatient. she has no fevers or chills.  PAST MEDICAL & SURGICAL HISTORY:  Hyperlipidemia  Diabetes  Essential hypertension  S/P cholecystectomy      No Known Allergies      Meds:  acetaminophen  IVPB. 1000 milliGRAM(s) IV Intermittent once  baclofen 10 milliGRAM(s) Oral two times a day PRN  brimonidine 0.2% Ophthalmic Solution 1 Drop(s) Both EYES daily  ciprofloxacin   IVPB 400 milliGRAM(s) IV Intermittent every 12 hours  dextrose 5% + sodium chloride 0.9%. 1000 milliLiter(s) IV Continuous <Continuous>  dextrose 5%. 1000 milliLiter(s) IV Continuous <Continuous>  dextrose 5%. 1000 milliLiter(s) IV Continuous <Continuous>  dextrose 50% Injectable 12.5 Gram(s) IV Push once  dextrose 50% Injectable 25 Gram(s) IV Push once  dextrose 50% Injectable 25 Gram(s) IV Push once  dextrose Gel 1 Dose(s) Oral once PRN  fenofibrate Tablet 48 milliGRAM(s) Oral at bedtime  glucagon  Injectable 1 milliGRAM(s) IntraMuscular once PRN  heparin  Injectable 5000 Unit(s) SubCutaneous every 8 hours  insulin lispro (HumaLOG) corrective regimen sliding scale   SubCutaneous three times a day before meals  latanoprost 0.005% Ophthalmic Solution 1 Drop(s) Both EYES at bedtime  losartan 100 milliGRAM(s) Oral daily  metoprolol succinate ER 50 milliGRAM(s) Oral daily  metroNIDAZOLE  IVPB 500 milliGRAM(s) IV Intermittent every 8 hours  morphine  - Injectable 2 milliGRAM(s) IV Push every 6 hours PRN  pregabalin 50 milliGRAM(s) Oral two times a day  simvastatin 10 milliGRAM(s) Oral at bedtime  timolol 0.5% Solution 1 Drop(s) Both EYES daily      SOCIAL HISTORY:  Smoker: no  ETOH use: no    FAMILY HISTORY:  Family history of breast cancer in female (Aunt)  Family history of diabetes mellitus (Father, Mother)      VITALS:  Vital Signs Last 24 Hrs  T(C): 36.8 (09 Apr 2018 05:34), Max: 36.8 (09 Apr 2018 05:34)  T(F): 98.2 (09 Apr 2018 05:34), Max: 98.2 (09 Apr 2018 05:34)  HR: 71 (09 Apr 2018 05:34) (68 - 71)  BP: 120/70 (09 Apr 2018 05:34) (120/70 - 141/78)  BP(mean): --  RR: 16 (09 Apr 2018 05:34) (16 - 18)  SpO2: 94% (09 Apr 2018 05:34) (94% - 100%)    LABS/DIAGNOSTIC TESTS:                          11.1   8.1   )-----------( 261      ( 09 Apr 2018 08:32 )             34.6     WBC Count: 8.1 K/uL (04-09 @ 08:32)  WBC Count: 7.8 K/uL (04-08 @ 06:29)      04-09    139  |  106  |  4<L>  ----------------------------<  132<H>  3.2<L>   |  24  |  0.70    Ca    9.1      09 Apr 2018 08:32                    LACTATE:    ABG -     CULTURES:       RADIOLOGY:< from: CT Abdomen and Pelvis w/ Oral Cont and w/ IV Cont (04.09.18 @ 10:20) >  EXAM:  CT ABDOMEN AND PELVIS OC IC                            PROCEDURE DATE:  04/09/2018          INTERPRETATION:  CLINICAL INFORMATION: Appendicitis, evaluate for   collection    COMPARISON: CT of the abdomen pelvis dated 4/5/2018.    PROCEDURE:   CT of the Abdomen and Pelvis was performed with intravenous contrast.   Intravenous contrast: 97 ml Omnipaque 350. 3 ml discarded.  Oral contrast: positive contrast was administered.  Sagittal and coronal reformats were performed.    FINDINGS:    LOWER CHEST: Coronary artery calcifications are present. Trace bibasilar   atelectasis.    LIVER: Within normal limits.  BILE DUCTS: Normal caliber.  GALLBLADDER: Status post cholecystectomy.  SPLEEN: Within normal limits.  PANCREAS: Fatty atrophy in the region of the pancreatic head.  ADRENALS: Within normal limits.  KIDNEYS/URETERS: Within normal limits.    BLADDER: Underdistended.  REPRODUCTIVE ORGANS: Prostate and seminal vesicles are unremarkable.    BOWEL: No bowel obstruction. Diverticulitis involving the distal   descending colon.The appendix is inflamed measuring 9 mm, although it now   fills with oral contrast. There are mild surrounding inflammatory   changes. There is unchanged thickening of the medial cecum. No associated   collection or abscess.  PERITONEUM: No ascites.  VESSELS:  Within normal limits.  RETROPERITONEUM: No lymphadenopathy.    ABDOMINAL WALL: Within normal limits.  BONES: Degenerative changes of the spine    IMPRESSION:     Appendicitis with decreased surrounding inflammation. No perforation or   associated abscess. Thickening of the medial cecum is likely reactive,   however follow-up colonoscopy is recommended after the completion of   treatment.    Diverticulitis of the distal descending colon, new since 4/5/2018.      -------------------------------------------------------------------------------------------------------------------------------------    < from: CT Abdomen and Pelvis w/ Oral Cont and w/ IV Cont (04.05.18 @ 14:12) >  EXAM:  CT ABDOMEN AND PELVIS OC IC                            PROCEDURE DATE:  04/05/2018          INTERPRETATION:  CT of the abdomen and pelvis with IV contrast    Clinical Indication: Right lower quadrant abdominal pain    Technique: Axial multidetector CT images of the abdomen and pelvis are   acquired following the administration of oral and IV contrast (95 cc   Omnipaque-350 administered, 5 cc discarded).    Comparison: None.    Findings:    Abdomen: Limited sections through the lung basesdemonstrate mild   dependent pleural-parenchymal changes bilaterally. Cholecystectomy clips   are present. Mild hepatic steatosis. There is a 1.0 cm hypodense lesion   in the pancreatic head; this lesion may be cystic. The spleen, right   adrenal andthe left kidney appear unremarkable. Tiny hypodense lesion in   the right kidney, too small to characterize. There are 2 nonspecific left   adrenal nodules measuring up to 1.2 cm.    The appendix is dilated measuring 1.2 cm in caliber with adjacent   stranding, compatible with acute appendicitis. The cecum adjacent to the   appendiceal orifice is thickened with lobulated borders. This may be due   to reactive inflammation from acute appendicitis or an obstructive cecal   cancer causing acute appendicitis. There is a small 1.4 x 0.9 cm   periappendiceal focal fluid collection, suggestive of an abscess (image   84 series 2). No evidence for small bowel or colonic obstruction.   Duodenal diverticulum is noted.    No evidence for free air, ascites, or enlarged lymph node.    Pelvis: The urinary bladder and uterus appear grossly unremarkable.   Sigmoid diverticulosis without evidence for diverticulitis. No pelvic   free fluid, or enlarged pelvic lymph node.    Impression: Acute appendicitis. Apparent small focal periappendiceal   fluid collection, suggestive of an abscess. The cecum adjacent to the   appendiceal orifice is thickened with lobulated borders; this may be due   to reactive inflammation from acute appendicitis or an obstructivececal   cancer causing acute appendicitis.    1.0 cm hypodense lesion in the pancreatic head; this lesion may be   cystic. If clinically indicated, abdominal MR without and with IV   contrast including MRCP may be pursued for further evaluation on a   nonemergent outpatient basis.    Other findings as above.        < end of copied text >      ROS  [  ] UNABLE TO ELICIT

## 2018-04-09 NOTE — CONSULT NOTE ADULT - ASSESSMENT
acute appendicitis - improving clinically and on CT abdomen  acute diverticulitis(mild)    plan - agree with dcing home today and getting an outpatient colonoscopy and interval appendectomy  can dc on ceftin 500mgs po bid and flagyl 500mgs po tid both for 5 days more  reconsult prn

## 2018-04-09 NOTE — CONSULT NOTE ADULT - GASTROINTESTINAL DETAILS
soft/no guarding/no organomegaly/no rebound tenderness/no distention/no rigidity/bowel sounds normal

## 2018-04-09 NOTE — DISCHARGE NOTE ADULT - PLAN OF CARE
resolution of inflammation please follow up with Dr. Powell within 1 week   Will need colonoscopy as outpatient for further evaluation of cecum.   Diet as tolerated changed losartan to half the dose, please follow up with PCP for further management Continue current care and follow up with PCP

## 2018-04-09 NOTE — DISCHARGE NOTE ADULT - SECONDARY DIAGNOSIS.
Essential hypertension Hyperlipidemia Type 2 diabetes mellitus without complication, unspecified long term insulin use status

## 2018-04-09 NOTE — DISCHARGE NOTE ADULT - MEDICATION SUMMARY - MEDICATIONS TO TAKE
I will START or STAY ON the medications listed below when I get home from the hospital:    Flagyl 500 mg oral tablet  -- 1 tab(s) by mouth 3 times a day   -- Do not drink alcoholic beverages when taking this medication.  Finish all this medication unless otherwise directed by prescriber.  May discolor urine or feces.    -- Indication: For Appendicitis    losartan 50 mg oral tablet  -- 1 tab(s) by mouth once a day   -- Do not take this drug if you are pregnant.  It is very important that you take or use this exactly as directed.  Do not skip doses or discontinue unless directed by your doctor.  Some non-prescription drugs may aggravate your condition.  Read all labels carefully.  If a warning appears, check with your doctor before taking.    -- Indication: For Htn     pregabalin 50 mg oral capsule  -- 1 cap(s) by mouth 2 times a day  -- Indication: For neuropatic pain     simvastatin 10 mg oral tablet  -- 1 tab(s) by mouth once a day (at bedtime)  -- Indication: For Hld     fenofibrate 48 mg oral tablet  -- 1 tab(s) by mouth once a day (at bedtime)  -- Indication: For Hyperlipidemia    metoprolol succinate 50 mg oral tablet, extended release  -- 1 tab(s) by mouth once a day  -- Indication: For HTN     Ceftin 250 mg oral tablet  -- 2 tab(s) by mouth every 12 hours   -- Finish all this medication unless otherwise directed by prescriber.  Medication should be taken with plenty of water.  Take with food or milk.    -- Indication: For Appendicitis    baclofen 10 mg oral tablet  -- 1 tab(s) by mouth 2 times a day, As needed, back pain or spasm  -- Indication: For pain     brimonidine 0.2% ophthalmic solution  -- 1 drop(s) to each affected eye once a day  -- Indication: For Eye drops     latanoprost 0.005% ophthalmic solution  -- 1 drop(s) to each affected eye once a day (at bedtime)  -- Indication: For Eye drops

## 2018-04-09 NOTE — PROGRESS NOTE ADULT - ASSESSMENT
appendicitis with abscess and cecal mass vs inflammation. Hypoglycemic yesterday  1. cont abx  2. NPO  3. IV fluids with D5  4. Ambulation  5. cont sliding scale
67 year old female with appendicitis vs collection on CT, abdominal pain resloved
67y.o. Female with questionable cecal mass with appendicitis
OOB  Start clear fluids
Repeat CT in AM

## 2018-04-09 NOTE — DISCHARGE NOTE ADULT - CARE PLAN
Principal Discharge DX:	Appendicitis, unspecified appendicitis type  Goal:	resolution of inflammation  Assessment and plan of treatment:	please follow up with Dr. Powell within 1 week   Will need colonoscopy as outpatient for further evaluation of cecum.   Diet as tolerated  Secondary Diagnosis:	Essential hypertension  Goal:	changed losartan to half the dose, please follow up with PCP for further management  Secondary Diagnosis:	Hyperlipidemia  Goal:	Continue current care and follow up with PCP  Secondary Diagnosis:	Type 2 diabetes mellitus without complication, unspecified long term insulin use status  Goal:	Continue current care and follow up with PCP

## 2018-04-09 NOTE — DISCHARGE NOTE ADULT - PATIENT PORTAL LINK FT
You can access the [facility] Portal, offered by [facility], by registering with the following website: [email or web address]

## 2018-04-09 NOTE — DISCHARGE NOTE ADULT - HOSPITAL COURSE
Ms Herrera is a 68 y/o female with past medical history of HTN, HLD, DM presents to the ED after PCP visit yesterday with c/o RLQ abdominal pain for the past 4 days. Pt describes the pain as sharp, 8/10 and radiating to the right flank region. She states the pain is constant throughout the day and denies any alleviating or aggregating factors. Pt has taken tylenol with no relief.  Associated symptoms include three episodes of non bloody watery diarrhea, nausea and anorexia. Pt denies any fever, headache, chest pain, sob, or urinary complaints. Last colonoscopy 2001, pt reports it was negative  Patient was admitted for acute appendicitis, patient was noted to have inflammation of cecum and mass could not be ruled out. Patient was treated conservatively with antibiotics. Patient symptoms improved. CT scan done which revealed improved inflammation. Patient for d/c home with oral antibiotics and outpatient colonoscopy.

## 2018-04-09 NOTE — PROGRESS NOTE ADULT - SUBJECTIVE AND OBJECTIVE BOX
Pt seen at bedside  Patient is a 67y old  Female who presents with a chief complaint of abdominal pain (05 Apr 2018 16:22)      INTERVAL HPI/OVERNIGHT EVENTS:  Pt states pain is mildly improved  Denies nausea or vomiting    Vital Signs Last 24 Hrs  T(C): 36.6 (06 Apr 2018 05:07), Max: 36.7 (05 Apr 2018 21:11)  T(F): 97.8 (06 Apr 2018 05:07), Max: 98 (05 Apr 2018 21:11)  HR: 68 (06 Apr 2018 05:07) (68 - 87)  BP: 108/60 (06 Apr 2018 05:07) (101/68 - 132/74)  BP(mean): --  RR: 16 (06 Apr 2018 05:07) (16 - 20)  SpO2: 100% (06 Apr 2018 05:07) (95% - 100%)    Physical Exam:    Gen: awake, alert oriented NAD  Abd: soft, + tenderness RLQ, no rebound or guarding    MEDICATIONS  (STANDING):  acetaminophen  IVPB. 1000 milliGRAM(s) IV Intermittent once  brimonidine 0.2% Ophthalmic Solution 1 Drop(s) Both EYES daily  ciprofloxacin   IVPB 400 milliGRAM(s) IV Intermittent every 12 hours  dextrose 5%. 1000 milliLiter(s) (50 mL/Hr) IV Continuous <Continuous>  dextrose 5%. 1000 milliLiter(s) (50 mL/Hr) IV Continuous <Continuous>  dextrose 50% Injectable 12.5 Gram(s) IV Push once  dextrose 50% Injectable 25 Gram(s) IV Push once  dextrose 50% Injectable 25 Gram(s) IV Push once  fenofibrate Tablet 48 milliGRAM(s) Oral at bedtime  heparin  Injectable 5000 Unit(s) SubCutaneous every 8 hours  insulin lispro (HumaLOG) corrective regimen sliding scale   SubCutaneous three times a day before meals  latanoprost 0.005% Ophthalmic Solution 1 Drop(s) Both EYES at bedtime  losartan 100 milliGRAM(s) Oral daily  metoprolol succinate ER 50 milliGRAM(s) Oral daily  metroNIDAZOLE  IVPB 500 milliGRAM(s) IV Intermittent every 8 hours  pregabalin 50 milliGRAM(s) Oral two times a day  simvastatin 10 milliGRAM(s) Oral at bedtime  sodium chloride 0.9%. 1000 milliLiter(s) (110 mL/Hr) IV Continuous <Continuous>  timolol 0.5% Solution 1 Drop(s) Both EYES daily    MEDICATIONS  (PRN):  baclofen 10 milliGRAM(s) Oral two times a day PRN back pain or spasm  dextrose Gel 1 Dose(s) Oral once PRN Blood Glucose LESS THAN 70 milliGRAM(s)/deciliter  glucagon  Injectable 1 milliGRAM(s) IntraMuscular once PRN Glucose LESS THAN 70 milligrams/deciliter  morphine  - Injectable 2 milliGRAM(s) IV Push every 6 hours PRN Moderate Pain (4 - 6)                            12.3   10.2  )-----------( 276      ( 05 Apr 2018 12:32 )             39.7     04-06    137  |  103  |  19<H>  ----------------------------<  92  3.8   |  25  |  0.65    Ca    9.2      06 Apr 2018 07:22    TPro  8.5<H>  /  Alb  3.7  /  TBili  0.4  /  DBili  x   /  AST  20  /  ALT  14  /  AlkPhos  44  04-05    PT/INR - ( 05 Apr 2018 12:32 )   PT: 11.9 sec;   INR: 1.09 ratio         PTT - ( 05 Apr 2018 12:32 )  PTT:41.3 sec    I&O's Detail
INTERVAL HPI/OVERNIGHT EVENTS:  Pt stable.   NPO  flatus/BM.  Patient states abdominal pain is significantly better    Vital Signs Last 24 Hrs  T(C): 36.8 (07 Apr 2018 05:07), Max: 36.8 (07 Apr 2018 05:07)  T(F): 98.3 (07 Apr 2018 05:07), Max: 98.3 (07 Apr 2018 05:07)  HR: 72 (07 Apr 2018 05:07) (66 - 72)  BP: 128/69 (07 Apr 2018 05:07) (105/66 - 128/69)  BP(mean): --  RR: 15 (07 Apr 2018 05:07) (15 - 17)  SpO2: 100% (07 Apr 2018 05:07) (93% - 100%)    Physical:  Abdomen: Soft nondistended, mild LLQ tenderness  No masses    I&O's Summary      LABS:                        10.8   6.3   )-----------( 216      ( 06 Apr 2018 07:22 )             34.4             04-06    137  |  103  |  19<H>  ----------------------------<  92  3.8   |  25  |  0.65    Ca    9.2      06 Apr 2018 07:22    TPro  8.5<H>  /  Alb  3.7  /  TBili  0.4  /  DBili  x   /  AST  20  /  ALT  14  /  AlkPhos  44  04-05
INTERVAL HPI/OVERNIGHT EVENTS:  Pt stable.   Tolerating diet.   flatus/BM.    Vital Signs Last 24 Hrs  T(C): 37.2 (08 Apr 2018 04:59), Max: 37.2 (08 Apr 2018 04:59)  T(F): 99 (08 Apr 2018 04:59), Max: 99 (08 Apr 2018 04:59)  HR: 76 (08 Apr 2018 04:59) (73 - 76)  BP: 158/63 (08 Apr 2018 04:59) (113/60 - 158/63)  BP(mean): --  RR: 17 (08 Apr 2018 04:59) (16 - 18)  SpO2: 97% (08 Apr 2018 04:59) (97% - 99%)    Physical:  Abdomen: Soft nondistended, mild RLQ tenderness  No complaints    I&O's Summary      LABS:                        10.7   7.8   )-----------( 236      ( 08 Apr 2018 06:29 )             34.5             04-08    142  |  109<H>  |  4<L>  ----------------------------<  98  3.6   |  25  |  0.69    Ca    9.0      08 Apr 2018 06:29
Patient examined at bedside, denies abdominal pain  No nausea, no vomiting  Tolerating diet    T(C): 37.2 (04-08-18 @ 04:59), Max: 37.2 (04-08-18 @ 04:59)  HR: 76 (04-08-18 @ 04:59) (73 - 76)  BP: 158/63 (04-08-18 @ 04:59) (113/60 - 158/63)  RR: 17 (04-08-18 @ 04:59) (16 - 18)  SpO2: 97% (04-08-18 @ 04:59) (97% - 99%)  Wt(kg): --      Physical Exam  General: AAOx3, No acute distress  Skin: No jaundice, no icterus  Abdomen: soft, nontender, nondistended, no rebound tenderness, no guarding  Extremities: non edematous, no calf pain bilaterally                        10.7   7.8   )-----------( 236      ( 08 Apr 2018 06:29 )             34.5   04-08    142  |  109<H>  |  4<L>  ----------------------------<  98  3.6   |  25  |  0.69    Ca    9.0      08 Apr 2018 06:29
Surgery    Subjective:  Pt resting comfortably. No acute complaints  c/o with mild RLQ pain  Tolerating diet  Denies N/V  Scheduled for rpt CT abd/pelvis today.  On Cipro/Flagyl    T(C): 36.8 (04-09-18 @ 05:34), Max: 36.8 (04-09-18 @ 05:34)  HR: 71 (04-09-18 @ 05:34) (68 - 71)  BP: 120/70 (04-09-18 @ 05:34) (120/70 - 141/78)  RR: 16 (04-09-18 @ 05:34) (16 - 18)  SpO2: 94% (04-09-18 @ 05:34) (94% - 100%)    Physical:  Gen: A&O x3  Abd: Soft ND, mild RLQ discomfort

## 2018-04-11 PROBLEM — E11.9 TYPE 2 DIABETES MELLITUS WITHOUT COMPLICATIONS: Chronic | Status: ACTIVE | Noted: 2018-04-05

## 2018-04-11 PROBLEM — E78.5 HYPERLIPIDEMIA, UNSPECIFIED: Chronic | Status: ACTIVE | Noted: 2018-04-05

## 2018-04-11 PROBLEM — I10 ESSENTIAL (PRIMARY) HYPERTENSION: Chronic | Status: ACTIVE | Noted: 2018-04-05

## 2018-04-18 PROBLEM — Z00.00 ENCOUNTER FOR PREVENTIVE HEALTH EXAMINATION: Status: ACTIVE | Noted: 2018-04-18

## 2018-05-08 ENCOUNTER — LABORATORY RESULT (OUTPATIENT)
Age: 68
End: 2018-05-08

## 2018-05-08 ENCOUNTER — APPOINTMENT (OUTPATIENT)
Dept: SURGERY | Facility: CLINIC | Age: 68
End: 2018-05-08
Payer: MEDICARE

## 2018-05-08 VITALS
HEART RATE: 70 BPM | TEMPERATURE: 98.5 F | DIASTOLIC BLOOD PRESSURE: 68 MMHG | WEIGHT: 145 LBS | HEIGHT: 61 IN | BODY MASS INDEX: 27.38 KG/M2 | OXYGEN SATURATION: 98 % | SYSTOLIC BLOOD PRESSURE: 106 MMHG

## 2018-05-08 PROCEDURE — 99213 OFFICE O/P EST LOW 20 MIN: CPT

## 2020-01-01 NOTE — ED ADULT NURSE NOTE - FALL HARM RISK TYPE OF ASSESSMENT
Admission [] : The components of the vaccine(s) to be administered today are listed in the plan of care. The disease(s) for which the vaccine(s) are intended to prevent and the risks have been discussed with the caretaker.  The risks are also included in the appropriate vaccination information statements which have been provided to the patient's caregiver.  The caregiver has given consent to vaccinate.

## 2022-05-21 ENCOUNTER — INPATIENT (INPATIENT)
Facility: HOSPITAL | Age: 72
LOS: 4 days | Discharge: HOME | DRG: 373 | End: 2022-05-26
Attending: SURGERY | Admitting: SURGERY
Payer: COMMERCIAL

## 2022-05-21 VITALS
TEMPERATURE: 99 F | DIASTOLIC BLOOD PRESSURE: 73 MMHG | HEIGHT: 62 IN | SYSTOLIC BLOOD PRESSURE: 115 MMHG | HEART RATE: 86 BPM | RESPIRATION RATE: 18 BRPM | WEIGHT: 145.06 LBS | OXYGEN SATURATION: 98 %

## 2022-05-21 DIAGNOSIS — R10.9 UNSPECIFIED ABDOMINAL PAIN: ICD-10-CM

## 2022-05-21 DIAGNOSIS — K35.32 ACUTE APPENDICITIS WITH PERFORATION, LOCALIZED PERITONITIS, AND GANGRENE, WITHOUT ABSCESS: ICD-10-CM

## 2022-05-21 DIAGNOSIS — Z90.49 ACQUIRED ABSENCE OF OTHER SPECIFIED PARTS OF DIGESTIVE TRACT: Chronic | ICD-10-CM

## 2022-05-21 LAB
ALBUMIN SERPL ELPH-MCNC: 2.7 G/DL — LOW (ref 3.5–5)
ALP SERPL-CCNC: 70 U/L — SIGNIFICANT CHANGE UP (ref 40–120)
ALT FLD-CCNC: 21 U/L DA — SIGNIFICANT CHANGE UP (ref 10–60)
ANION GAP SERPL CALC-SCNC: 7 MMOL/L — SIGNIFICANT CHANGE UP (ref 5–17)
APTT BLD: 34 SEC — SIGNIFICANT CHANGE UP (ref 27.5–35.5)
AST SERPL-CCNC: 18 U/L — SIGNIFICANT CHANGE UP (ref 10–40)
BASOPHILS # BLD AUTO: 0.07 K/UL — SIGNIFICANT CHANGE UP (ref 0–0.2)
BASOPHILS NFR BLD AUTO: 0.5 % — SIGNIFICANT CHANGE UP (ref 0–2)
BILIRUB SERPL-MCNC: 0.3 MG/DL — SIGNIFICANT CHANGE UP (ref 0.2–1.2)
BLD GP AB SCN SERPL QL: SIGNIFICANT CHANGE UP
BUN SERPL-MCNC: 18 MG/DL — SIGNIFICANT CHANGE UP (ref 7–18)
CALCIUM SERPL-MCNC: 9.3 MG/DL — SIGNIFICANT CHANGE UP (ref 8.4–10.5)
CHLORIDE SERPL-SCNC: 105 MMOL/L — SIGNIFICANT CHANGE UP (ref 96–108)
CO2 SERPL-SCNC: 28 MMOL/L — SIGNIFICANT CHANGE UP (ref 22–31)
CREAT SERPL-MCNC: 0.94 MG/DL — SIGNIFICANT CHANGE UP (ref 0.5–1.3)
EGFR: 64 ML/MIN/1.73M2 — SIGNIFICANT CHANGE UP
EOSINOPHIL # BLD AUTO: 0.12 K/UL — SIGNIFICANT CHANGE UP (ref 0–0.5)
EOSINOPHIL NFR BLD AUTO: 0.9 % — SIGNIFICANT CHANGE UP (ref 0–6)
GLUCOSE SERPL-MCNC: 121 MG/DL — HIGH (ref 70–99)
HCT VFR BLD CALC: 35.9 % — SIGNIFICANT CHANGE UP (ref 34.5–45)
HGB BLD-MCNC: 12 G/DL — SIGNIFICANT CHANGE UP (ref 11.5–15.5)
IMM GRANULOCYTES NFR BLD AUTO: 1.6 % — HIGH (ref 0–1.5)
INR BLD: 1.21 RATIO — HIGH (ref 0.88–1.16)
LACTATE SERPL-SCNC: 2.4 MMOL/L — HIGH (ref 0.7–2)
LIDOCAIN IGE QN: 91 U/L — SIGNIFICANT CHANGE UP (ref 73–393)
LYMPHOCYTES # BLD AUTO: 1.8 K/UL — SIGNIFICANT CHANGE UP (ref 1–3.3)
LYMPHOCYTES # BLD AUTO: 13.7 % — SIGNIFICANT CHANGE UP (ref 13–44)
MCHC RBC-ENTMCNC: 29.3 PG — SIGNIFICANT CHANGE UP (ref 27–34)
MCHC RBC-ENTMCNC: 33.4 GM/DL — SIGNIFICANT CHANGE UP (ref 32–36)
MCV RBC AUTO: 87.6 FL — SIGNIFICANT CHANGE UP (ref 80–100)
MONOCYTES # BLD AUTO: 1.49 K/UL — HIGH (ref 0–0.9)
MONOCYTES NFR BLD AUTO: 11.3 % — SIGNIFICANT CHANGE UP (ref 2–14)
NEUTROPHILS # BLD AUTO: 9.47 K/UL — HIGH (ref 1.8–7.4)
NEUTROPHILS NFR BLD AUTO: 72 % — SIGNIFICANT CHANGE UP (ref 43–77)
NRBC # BLD: 0 /100 WBCS — SIGNIFICANT CHANGE UP (ref 0–0)
PLATELET # BLD AUTO: 216 K/UL — SIGNIFICANT CHANGE UP (ref 150–400)
POTASSIUM SERPL-MCNC: 4.3 MMOL/L — SIGNIFICANT CHANGE UP (ref 3.5–5.3)
POTASSIUM SERPL-SCNC: 4.3 MMOL/L — SIGNIFICANT CHANGE UP (ref 3.5–5.3)
PROT SERPL-MCNC: 7 G/DL — SIGNIFICANT CHANGE UP (ref 6–8.3)
PROTHROM AB SERPL-ACNC: 14.4 SEC — HIGH (ref 10.5–13.4)
RBC # BLD: 4.1 M/UL — SIGNIFICANT CHANGE UP (ref 3.8–5.2)
RBC # FLD: 14.7 % — HIGH (ref 10.3–14.5)
SARS-COV-2 RNA SPEC QL NAA+PROBE: SIGNIFICANT CHANGE UP
SODIUM SERPL-SCNC: 140 MMOL/L — SIGNIFICANT CHANGE UP (ref 135–145)
WBC # BLD: 13.16 K/UL — HIGH (ref 3.8–10.5)
WBC # FLD AUTO: 13.16 K/UL — HIGH (ref 3.8–10.5)

## 2022-05-21 PROCEDURE — 99285 EMERGENCY DEPT VISIT HI MDM: CPT

## 2022-05-21 PROCEDURE — 74176 CT ABD & PELVIS W/O CONTRAST: CPT | Mod: 26,MA

## 2022-05-21 RX ORDER — SODIUM CHLORIDE 9 MG/ML
1000 INJECTION, SOLUTION INTRAVENOUS
Refills: 0 | Status: DISCONTINUED | OUTPATIENT
Start: 2022-05-21 | End: 2022-05-26

## 2022-05-21 RX ORDER — PIPERACILLIN AND TAZOBACTAM 4; .5 G/20ML; G/20ML
3.38 INJECTION, POWDER, LYOPHILIZED, FOR SOLUTION INTRAVENOUS EVERY 8 HOURS
Refills: 0 | Status: DISCONTINUED | OUTPATIENT
Start: 2022-05-21 | End: 2022-05-26

## 2022-05-21 RX ORDER — DEXTROSE 50 % IN WATER 50 %
15 SYRINGE (ML) INTRAVENOUS ONCE
Refills: 0 | Status: DISCONTINUED | OUTPATIENT
Start: 2022-05-21 | End: 2022-05-26

## 2022-05-21 RX ORDER — KETOROLAC TROMETHAMINE 30 MG/ML
15 SYRINGE (ML) INJECTION ONCE
Refills: 0 | Status: DISCONTINUED | OUTPATIENT
Start: 2022-05-21 | End: 2022-05-21

## 2022-05-21 RX ORDER — SODIUM CHLORIDE 9 MG/ML
1000 INJECTION INTRAMUSCULAR; INTRAVENOUS; SUBCUTANEOUS ONCE
Refills: 0 | Status: COMPLETED | OUTPATIENT
Start: 2022-05-21 | End: 2022-05-21

## 2022-05-21 RX ORDER — DIATRIZOATE MEGLUMINE 180 MG/ML
30 INJECTION, SOLUTION INTRAVESICAL ONCE
Refills: 0 | Status: COMPLETED | OUTPATIENT
Start: 2022-05-21 | End: 2022-05-21

## 2022-05-21 RX ORDER — DEXTROSE 50 % IN WATER 50 %
12.5 SYRINGE (ML) INTRAVENOUS ONCE
Refills: 0 | Status: DISCONTINUED | OUTPATIENT
Start: 2022-05-21 | End: 2022-05-26

## 2022-05-21 RX ORDER — GLUCAGON INJECTION, SOLUTION 0.5 MG/.1ML
1 INJECTION, SOLUTION SUBCUTANEOUS ONCE
Refills: 0 | Status: DISCONTINUED | OUTPATIENT
Start: 2022-05-21 | End: 2022-05-26

## 2022-05-21 RX ORDER — MORPHINE SULFATE 50 MG/1
2 CAPSULE, EXTENDED RELEASE ORAL EVERY 4 HOURS
Refills: 0 | Status: DISCONTINUED | OUTPATIENT
Start: 2022-05-21 | End: 2022-05-26

## 2022-05-21 RX ORDER — ONDANSETRON 8 MG/1
4 TABLET, FILM COATED ORAL EVERY 6 HOURS
Refills: 0 | Status: DISCONTINUED | OUTPATIENT
Start: 2022-05-21 | End: 2022-05-26

## 2022-05-21 RX ORDER — DEXTROSE 50 % IN WATER 50 %
25 SYRINGE (ML) INTRAVENOUS ONCE
Refills: 0 | Status: DISCONTINUED | OUTPATIENT
Start: 2022-05-21 | End: 2022-05-26

## 2022-05-21 RX ORDER — HEPARIN SODIUM 5000 [USP'U]/ML
5000 INJECTION INTRAVENOUS; SUBCUTANEOUS EVERY 8 HOURS
Refills: 0 | Status: DISCONTINUED | OUTPATIENT
Start: 2022-05-21 | End: 2022-05-23

## 2022-05-21 RX ORDER — PIPERACILLIN AND TAZOBACTAM 4; .5 G/20ML; G/20ML
3.38 INJECTION, POWDER, LYOPHILIZED, FOR SOLUTION INTRAVENOUS ONCE
Refills: 0 | Status: COMPLETED | OUTPATIENT
Start: 2022-05-21 | End: 2022-05-21

## 2022-05-21 RX ORDER — ACETAMINOPHEN 500 MG
1000 TABLET ORAL ONCE
Refills: 0 | Status: COMPLETED | OUTPATIENT
Start: 2022-05-21 | End: 2022-05-22

## 2022-05-21 RX ORDER — INSULIN LISPRO 100/ML
VIAL (ML) SUBCUTANEOUS
Refills: 0 | Status: DISCONTINUED | OUTPATIENT
Start: 2022-05-21 | End: 2022-05-26

## 2022-05-21 RX ADMIN — SODIUM CHLORIDE 1000 MILLILITER(S): 9 INJECTION INTRAMUSCULAR; INTRAVENOUS; SUBCUTANEOUS at 17:14

## 2022-05-21 RX ADMIN — DIATRIZOATE MEGLUMINE 30 MILLILITER(S): 180 INJECTION, SOLUTION INTRAVESICAL at 17:15

## 2022-05-21 RX ADMIN — SODIUM CHLORIDE 125 MILLILITER(S): 9 INJECTION, SOLUTION INTRAVENOUS at 22:34

## 2022-05-21 RX ADMIN — Medication 15 MILLIGRAM(S): at 19:21

## 2022-05-21 RX ADMIN — PIPERACILLIN AND TAZOBACTAM 200 GRAM(S): 4; .5 INJECTION, POWDER, LYOPHILIZED, FOR SOLUTION INTRAVENOUS at 20:48

## 2022-05-21 RX ADMIN — HEPARIN SODIUM 5000 UNIT(S): 5000 INJECTION INTRAVENOUS; SUBCUTANEOUS at 22:29

## 2022-05-21 RX ADMIN — Medication 15 MILLIGRAM(S): at 19:55

## 2022-05-21 NOTE — ED ADULT NURSE NOTE - NSIMPLEMENTINTERV_GEN_ALL_ED
Implemented All Universal Safety Interventions:  [place] to call system. Call bell, personal items and telephone within reach. Instruct patient to call for assistance. Room bathroom lighting operational. Non-slip footwear when patient is off stretcher. Physically safe environment: no spills, clutter or unnecessary equipment. Stretcher in lowest position, wheels locked, appropriate side rails in place.

## 2022-05-21 NOTE — ED ADULT NURSE NOTE - NSHOSCREENINGQ1_ED_ALL_ED
Essential hypertension Essential hypertension Essential hypertension Essential hypertension Essential hypertension Essential hypertension No

## 2022-05-21 NOTE — ED PROVIDER NOTE - OBJECTIVE STATEMENT
71-year-old female hx of DM2, cholecystectomy 20 years ago, presenting with RLQ pain x 2 days. Constant. +nausea/vomiting. No diarrhea or constipation, no fevers or chills. No other symptoms.

## 2022-05-21 NOTE — ED PROVIDER NOTE - CLINICAL SUMMARY MEDICAL DECISION MAKING FREE TEXT BOX
71-year-old female hx of DM2, cholecystectomy 20 years ago, presenting with RLQ pain x 2 days. c/f appendicitis. Will check labs, urine, CTAP, provide analgesia, and reassess. 71-year-old female hx of DM2, cholecystectomy 20 years ago, presenting with RLQ pain x 2 days. c/f appendicitis. Will check labs, urine, CTAP, provide analgesia, and reassess.    8:41pm: CTAP with perforated appendicitis with collection, will admit to Surgery. Zosyn written.

## 2022-05-21 NOTE — H&P ADULT - NSHPPHYSICALEXAM_GEN_ALL_CORE
T(C): 37.6 (05-21-22 @ 20:29), Max: 37.6 (05-21-22 @ 20:29)  HR: 75 (05-21-22 @ 20:29) (75 - 86)  BP: 103/63 (05-21-22 @ 20:29) (103/63 - 115/73)  RR: 16 (05-21-22 @ 20:29) (16 - 18)  SpO2: 95% (05-21-22 @ 20:29) (95% - 98%)    CONSTITUTIONAL: Well groomed, no apparent distress  EYES: PERRLA and symmetric, EOMI, No conjunctival or scleral injection, non-icteric  NECK: Supple, symmetric and without tracheal deviation  RESPIRATORY: No respiratory distress, no use of accessory muscles; CTA b/l, no wheezes, rales or rhonchi  CARDIOVASCULAR: RRRR, +S1S2, no murmurs, no rubs, no gallops  GASTROINTESTINAL: Soft, obese ,focal RLQ tenderness,+ rebound, no guarding; no peritoneal signs  MUSCULOSKELETAL: Normal gait and station; no digital clubbing or cyanosis  SKIN: No rashes or ulcers noted; no subcutaneous nodules or induration palpable  PSYCHIATRIC: Appropriate insight/judgment; A+O x 3, mood and affect appropriate, recent/remote memory intact

## 2022-05-21 NOTE — H&P ADULT - NSHPADDITIONALINFOADULT_GEN_ALL_CORE
Pt seen and examined in ED. Admitted for perforated appendicitis. Reports abdominal pain started 3 days ago. Passing flatus, had diarrhea. Abd exam- soft, nondistended, focal RLQ tenderness, minimal guarding no rebound, no peritoneal signs. CT reviewed, shows ~ 5 cm abscess adjacent to appendicolith, marked inflammation adjacent cecum  Was admitted 2018 for acute appendicitis, managed nonoperatively. At the time was concern for possible neoplasm vs inflammation on CT scan and pt recommended to have f/u colonoscopy after discharge. She reports last colonoscopy was prior to COVID pandemic with Dr. Mando Hull ~ 3years ago, unsure if was after that admission.   Recommend IV abx, IR drainage and interval appendectomy.

## 2022-05-21 NOTE — H&P ADULT - ASSESSMENT
70 y/o female PMH DM, HTN, unsure of her meds;  PSH cholecystectomy  treated conservatively for poss appendicitis in 2018  c/o continued RLQ pain with n/v on and off since Thursday  no f/c  CT concerned for perforated appendicitis with poss asc colon mass vs reactive thickening

## 2022-05-21 NOTE — H&P ADULT - NSICDXFAMILYHX_GEN_ALL_CORE_FT
FAMILY HISTORY:  Father  Still living? Unknown  Family history of diabetes mellitus, Age at diagnosis: Age Unknown    Mother  Still living? Unknown  Family history of diabetes mellitus, Age at diagnosis: Age Unknown    Aunt  Still living? Unknown  Family history of breast cancer in female, Age at diagnosis: Age Unknown

## 2022-05-21 NOTE — H&P ADULT - HISTORY OF PRESENT ILLNESS
70 y/o female PMH DM, HTN, unsure of her meds;  PSH cholecystectomy  treated conservatively for poss appendicitis in 2018  c/o continued RLQ pain with n/v on and off since Thursday  no f/c    < from: CT Abdomen and Pelvis w/ Oral Cont (05.21.22 @ 19:35) >  FINDINGS:  LOWER CHEST: Within normal limits.    LIVER: Calcified granuloma in the right hepatic lobe.  BILE DUCTS: Postcholecystectomy biliary ductal dilatation.  GALLBLADDER: Cholecystectomy.  SPLEEN: Within normal limits.  PANCREAS: Within normal limits.  ADRENALS: Nodular thickening of the leftadrenal gland, likely in the   basis of adrenal adenoma. Right adrenal gland within normal limits.  KIDNEYS/URETERS: Bilateral nonobstructing renal stones.    BLADDER: Within normal limits.  REPRODUCTIVE ORGANS: Uterus and bilateral adnexa within normal limits.    BOWEL: No bowel obstruction. Diverticulosis.    Appendicolith at the base of the appendix with appendiceal dilatation and   mild periappendiceal stranding, concerning for acute appendicitis.    Oral contrast in the colon. Asymmetric wall thickening of the ascending   colon above the level of the ileocecal valve concerning for colonic mass.    PERITONEUM: Fluid collection collection inferior to the base of the cecum   measuring 5.4 x 2.6 x 8.2 cm, concerning for periappendiceal abscess.   Small amount of pneumoperitoneum.  VESSELS: Atherosclerotic calcifications.  RETROPERITONEUM/LYMPH NODES: No lymphadenopathy.  ABDOMINAL WALL: Within normal limits.  BONES: Degenerative changes.    IMPRESSION:  Findings concerning for perforatedacute appendicitis with collection   adjacent to the base of the appendix measuring 5.4 x 2.6 x 8.2 cm.    Wall thickening of the ascending colon above the level of the ileocecal   valve, concerning for neoplasm. Reactive change related to adjacent   appendicitis not excluded.      < end of copied text >

## 2022-05-22 LAB
A1C WITH ESTIMATED AVERAGE GLUCOSE RESULT: 6.7 % — HIGH (ref 4–5.6)
ANION GAP SERPL CALC-SCNC: 10 MMOL/L — SIGNIFICANT CHANGE UP (ref 5–17)
APPEARANCE UR: CLEAR — SIGNIFICANT CHANGE UP
BACTERIA # UR AUTO: ABNORMAL /HPF
BILIRUB UR-MCNC: NEGATIVE — SIGNIFICANT CHANGE UP
BUN SERPL-MCNC: 14 MG/DL — SIGNIFICANT CHANGE UP (ref 7–18)
CALCIUM SERPL-MCNC: 8.9 MG/DL — SIGNIFICANT CHANGE UP (ref 8.4–10.5)
CHLORIDE SERPL-SCNC: 103 MMOL/L — SIGNIFICANT CHANGE UP (ref 96–108)
CO2 SERPL-SCNC: 25 MMOL/L — SIGNIFICANT CHANGE UP (ref 22–31)
COLOR SPEC: YELLOW — SIGNIFICANT CHANGE UP
COMMENT - URINE: SIGNIFICANT CHANGE UP
CREAT SERPL-MCNC: 0.81 MG/DL — SIGNIFICANT CHANGE UP (ref 0.5–1.3)
DIFF PNL FLD: ABNORMAL
EGFR: 77 ML/MIN/1.73M2 — SIGNIFICANT CHANGE UP
EPI CELLS # UR: ABNORMAL /HPF
ESTIMATED AVERAGE GLUCOSE: 146 MG/DL — HIGH (ref 68–114)
GLUCOSE BLDC GLUCOMTR-MCNC: 114 MG/DL — HIGH (ref 70–99)
GLUCOSE BLDC GLUCOMTR-MCNC: 120 MG/DL — HIGH (ref 70–99)
GLUCOSE BLDC GLUCOMTR-MCNC: 123 MG/DL — HIGH (ref 70–99)
GLUCOSE BLDC GLUCOMTR-MCNC: 125 MG/DL — HIGH (ref 70–99)
GLUCOSE BLDC GLUCOMTR-MCNC: 134 MG/DL — HIGH (ref 70–99)
GLUCOSE BLDC GLUCOMTR-MCNC: 136 MG/DL — HIGH (ref 70–99)
GLUCOSE SERPL-MCNC: 122 MG/DL — HIGH (ref 70–99)
GLUCOSE UR QL: 1000 MG/DL
HCT VFR BLD CALC: 34.9 % — SIGNIFICANT CHANGE UP (ref 34.5–45)
HCV AB S/CO SERPL IA: 2.11 S/CO — HIGH (ref 0–0.99)
HCV AB SERPL-IMP: ABNORMAL
HGB BLD-MCNC: 11.5 G/DL — SIGNIFICANT CHANGE UP (ref 11.5–15.5)
KETONES UR-MCNC: NEGATIVE — SIGNIFICANT CHANGE UP
LEUKOCYTE ESTERASE UR-ACNC: NEGATIVE — SIGNIFICANT CHANGE UP
MCHC RBC-ENTMCNC: 28.8 PG — SIGNIFICANT CHANGE UP (ref 27–34)
MCHC RBC-ENTMCNC: 33 GM/DL — SIGNIFICANT CHANGE UP (ref 32–36)
MCV RBC AUTO: 87.3 FL — SIGNIFICANT CHANGE UP (ref 80–100)
NITRITE UR-MCNC: NEGATIVE — SIGNIFICANT CHANGE UP
NRBC # BLD: 0 /100 WBCS — SIGNIFICANT CHANGE UP (ref 0–0)
PH UR: 5 — SIGNIFICANT CHANGE UP (ref 5–8)
PLATELET # BLD AUTO: 193 K/UL — SIGNIFICANT CHANGE UP (ref 150–400)
POTASSIUM SERPL-MCNC: 3.6 MMOL/L — SIGNIFICANT CHANGE UP (ref 3.5–5.3)
POTASSIUM SERPL-SCNC: 3.6 MMOL/L — SIGNIFICANT CHANGE UP (ref 3.5–5.3)
PROT UR-MCNC: 15
RBC # BLD: 4 M/UL — SIGNIFICANT CHANGE UP (ref 3.8–5.2)
RBC # FLD: 14.6 % — HIGH (ref 10.3–14.5)
RBC CASTS # UR COMP ASSIST: ABNORMAL /HPF (ref 0–2)
SODIUM SERPL-SCNC: 138 MMOL/L — SIGNIFICANT CHANGE UP (ref 135–145)
SP GR SPEC: 1.01 — SIGNIFICANT CHANGE UP (ref 1.01–1.02)
UROBILINOGEN FLD QL: NEGATIVE — SIGNIFICANT CHANGE UP
WBC # BLD: 10.31 K/UL — SIGNIFICANT CHANGE UP (ref 3.8–10.5)
WBC # FLD AUTO: 10.31 K/UL — SIGNIFICANT CHANGE UP (ref 3.8–10.5)
WBC UR QL: SIGNIFICANT CHANGE UP /HPF (ref 0–5)

## 2022-05-22 PROCEDURE — 99232 SBSQ HOSP IP/OBS MODERATE 35: CPT

## 2022-05-22 RX ADMIN — MORPHINE SULFATE 2 MILLIGRAM(S): 50 CAPSULE, EXTENDED RELEASE ORAL at 01:48

## 2022-05-22 RX ADMIN — Medication 1000 MILLIGRAM(S): at 11:00

## 2022-05-22 RX ADMIN — HEPARIN SODIUM 5000 UNIT(S): 5000 INJECTION INTRAVENOUS; SUBCUTANEOUS at 22:09

## 2022-05-22 RX ADMIN — PIPERACILLIN AND TAZOBACTAM 25 GRAM(S): 4; .5 INJECTION, POWDER, LYOPHILIZED, FOR SOLUTION INTRAVENOUS at 13:34

## 2022-05-22 RX ADMIN — PIPERACILLIN AND TAZOBACTAM 25 GRAM(S): 4; .5 INJECTION, POWDER, LYOPHILIZED, FOR SOLUTION INTRAVENOUS at 05:15

## 2022-05-22 RX ADMIN — SODIUM CHLORIDE 125 MILLILITER(S): 9 INJECTION, SOLUTION INTRAVENOUS at 22:09

## 2022-05-22 RX ADMIN — HEPARIN SODIUM 5000 UNIT(S): 5000 INJECTION INTRAVENOUS; SUBCUTANEOUS at 13:38

## 2022-05-22 RX ADMIN — MORPHINE SULFATE 2 MILLIGRAM(S): 50 CAPSULE, EXTENDED RELEASE ORAL at 18:53

## 2022-05-22 RX ADMIN — MORPHINE SULFATE 2 MILLIGRAM(S): 50 CAPSULE, EXTENDED RELEASE ORAL at 00:01

## 2022-05-22 RX ADMIN — Medication 400 MILLIGRAM(S): at 10:48

## 2022-05-22 RX ADMIN — HEPARIN SODIUM 5000 UNIT(S): 5000 INJECTION INTRAVENOUS; SUBCUTANEOUS at 05:15

## 2022-05-22 RX ADMIN — PIPERACILLIN AND TAZOBACTAM 25 GRAM(S): 4; .5 INJECTION, POWDER, LYOPHILIZED, FOR SOLUTION INTRAVENOUS at 22:09

## 2022-05-22 RX ADMIN — MORPHINE SULFATE 2 MILLIGRAM(S): 50 CAPSULE, EXTENDED RELEASE ORAL at 19:10

## 2022-05-22 NOTE — PATIENT PROFILE ADULT - NSPROHMDIABETMGMTSTRAT_GEN_A_NUR
only go to doctor's once every 3 months, don't check sugar before getting insulin. Gets insulin once a week, just started for a month./insulin therapy

## 2022-05-22 NOTE — PATIENT PROFILE ADULT - FALL HARM RISK - RISK INTERVENTIONS

## 2022-05-22 NOTE — PROGRESS NOTE ADULT - SUBJECTIVE AND OBJECTIVE BOX
INTERVAL HPI/OVERNIGHT EVENTS:    No acute events overnight.   Pt resting comfortably. No acute complaints.       MEDICATIONS  (STANDING):  dextrose 5% + sodium chloride 0.45%. 1000 milliLiter(s) (125 mL/Hr) IV Continuous <Continuous>  dextrose 5%. 1000 milliLiter(s) (50 mL/Hr) IV Continuous <Continuous>  dextrose 5%. 1000 milliLiter(s) (100 mL/Hr) IV Continuous <Continuous>  dextrose 50% Injectable 25 Gram(s) IV Push once  dextrose 50% Injectable 12.5 Gram(s) IV Push once  dextrose 50% Injectable 25 Gram(s) IV Push once  glucagon  Injectable 1 milliGRAM(s) IntraMuscular once  heparin   Injectable 5000 Unit(s) SubCutaneous every 8 hours  insulin lispro (ADMELOG) corrective regimen sliding scale   SubCutaneous three times a day before meals  piperacillin/tazobactam IVPB.. 3.375 Gram(s) IV Intermittent every 8 hours    MEDICATIONS  (PRN):  acetaminophen   IVPB .. 1000 milliGRAM(s) IV Intermittent once PRN Temp greater or equal to 38C (100.4F), Severe Pain (7 - 10)  dextrose Oral Gel 15 Gram(s) Oral once PRN Blood Glucose LESS THAN 70 milliGRAM(s)/deciliter  morphine  - Injectable 2 milliGRAM(s) IV Push every 4 hours PRN Moderate Pain (4 - 6)  ondansetron Injectable 4 milliGRAM(s) IV Push every 6 hours PRN Nausea and/or Vomiting      Vital Signs Last 24 Hrs  T(C): 37.3 (22 May 2022 05:15), Max: 37.6 (21 May 2022 20:29)  T(F): 99.2 (22 May 2022 05:15), Max: 99.6 (21 May 2022 20:29)  HR: 74 (22 May 2022 05:15) (74 - 86)  BP: 118/62 (22 May 2022 05:15) (103/63 - 119/60)  BP(mean): --  RR: 17 (22 May 2022 05:15) (16 - 19)  SpO2: 98% (22 May 2022 05:15) (95% - 98%)      PHYSICAL EXAM  General: Alert and oriented, not in acute distress  Resp: Breathing unlabored  Abdomen: Soft, nondistended, RQ ttp  : No cordova catheter, no dysuria or hematuria  Extremities: No pedal edema      I&O's Detail      LABS:                        11.5   10.31 )-----------( 193      ( 22 May 2022 08:15 )             34.9             05-22    138  |  103  |  14  ----------------------------<  122<H>  3.6   |  25  |  0.81    Ca    8.9      22 May 2022 08:15    TPro  7.0  /  Alb  2.7<L>  /  TBili  0.3  /  DBili  x   /  AST  18  /  ALT  21  /  AlkPhos  70  05-21

## 2022-05-22 NOTE — PATIENT PROFILE ADULT - FUNCTIONAL ASSESSMENT - BASIC MOBILITY 4.
Is This A New Presentation, Or A Follow-Up?: Growth How Severe Is Your Skin Lesion?: mild 4 = No assist / stand by assistance

## 2022-05-22 NOTE — PROGRESS NOTE ADULT - ASSESSMENT
71 yoF with perforated appendicitis with abscess    afeb, vss; labs wnl    - NPO, IVF   - zosyn  - IR consult tmrw for drainage  - dvt/gi ppx, pain control, oob/ambulate

## 2022-05-22 NOTE — PATIENT PROFILE ADULT - FALL HARM RISK - FACTORS
AUTHORIZATION FOR SURGICAL OPERATION OR OTHER PROCEDURE    1. I hereby authorize Dr. Maye Hoffman  and [facility] staff assigned to my case to perform the following operation and/or procedure at the [facility]:    _______________________________________________________________________________________________    Cortisone injection right heel   _______________________________________________________________________________________________    2. My physician has explained the nature and purpose of the operation or other procedure, possible alternative methods of treatment, the risks involved, and the possibility of complication to me. I acknowledge that no guarantee has been made as to the result that may be obtained. 3.  I recognize that, during the course of this operation, or other procedure, unforseen conditions may necessitate additional or different procedure than those listed above. I, therefore, further authorize and request that the above named physician, his/her physician assistants or designees perform such procedures as are, in his/her professional opinion, necessary and desirable. 4.  Any tissue or organs removed in the operation or other procedure may be disposed of by and at the discretion of the [facility] and [facility]. 5.  I understand that in the event of a medical emergency, I will be transported by local paramedics to [facility] or other hospital emergency department. 6.  I certify that I have read and fully understand the above consent to operation and/or other procedure. 7.  I acknowledge that my physician has explained sedation/analgesia administration to me including the risks and benefits. I consent to the administration of sedation/analgesia as may be necessary or desirable in the judgement of my physician.     Witness signature: ___________________________________________________ Date:  ______/______/_____ Time:  ________ A. M.  P.M. Patient Name:  ______________________________________________________  (please print)      Patient signature:  ___________________________________________________             Relationship to Patient:           []  Parent    Responsible person                          []  Spouse  In case of minor or                    [] Other  _____________   Incompetent name:  __________________________________________________                               (please print)      _____________      Responsible person  In case of minor or  Incompetent signature:  _______________________________________________    Statement of Physician  My signature below affirms that prior to the time of the procedure, I have explained to the patient and/or his/her guardian, the risks and benefits involved in the proposed treatment and any reasonable alternative to the proposed treatment. I have also explained the risks and benefits involved in the refusal of the proposed treatment and have answered the patient's questions.                         Date:  ______/______/_______  Provider                      Signature:  __________________________________________________________       Time:  ___________ A.M    P.M. Weakness

## 2022-05-22 NOTE — CONSULT NOTE ADULT - SUBJECTIVE AND OBJECTIVE BOX
HPI:  72 y/o female PMH DM, HTN, unsure of her meds;  PSH cholecystectomy  treated conservatively for poss appendicitis in 2018  c/o continued RLQ pain with n/v on and off since Thursday  no f/c        PAST MEDICAL & SURGICAL HISTORY:  Essential hypertension      Diabetes      Hyperlipidemia      S/P cholecystectomy          No Known Allergies      Meds:  dextrose 5% + sodium chloride 0.45%. 1000 milliLiter(s) IV Continuous <Continuous>  dextrose 5%. 1000 milliLiter(s) IV Continuous <Continuous>  dextrose 5%. 1000 milliLiter(s) IV Continuous <Continuous>  dextrose 50% Injectable 25 Gram(s) IV Push once  dextrose 50% Injectable 12.5 Gram(s) IV Push once  dextrose 50% Injectable 25 Gram(s) IV Push once  dextrose Oral Gel 15 Gram(s) Oral once PRN  glucagon  Injectable 1 milliGRAM(s) IntraMuscular once  heparin   Injectable 5000 Unit(s) SubCutaneous every 8 hours  insulin lispro (ADMELOG) corrective regimen sliding scale   SubCutaneous three times a day before meals  morphine  - Injectable 2 milliGRAM(s) IV Push every 4 hours PRN  ondansetron Injectable 4 milliGRAM(s) IV Push every 6 hours PRN  piperacillin/tazobactam IVPB.. 3.375 Gram(s) IV Intermittent every 8 hours      SOCIAL HISTORY:  Smoker:  YES / NO        PACK YEARS:                         WHEN QUIT?  ETOH use:  YES / NO               FREQUENCY / QUANTITY:  Ilicit Drug use:  YES / NO  Occupation:  Assisted device use (Cane / Walker):  Live with:    FAMILY HISTORY:  Family history of diabetes mellitus (Father, Mother)    Family history of breast cancer in female (Aunt)        VITALS:  Vital Signs Last 24 Hrs  T(C): 37.1 (22 May 2022 13:42), Max: 37.6 (21 May 2022 20:29)  T(F): 98.7 (22 May 2022 13:42), Max: 99.6 (21 May 2022 20:29)  HR: 66 (22 May 2022 13:42) (66 - 77)  BP: 110/65 (22 May 2022 13:42) (103/63 - 119/60)  BP(mean): --  RR: 17 (22 May 2022 13:42) (16 - 19)  SpO2: 95% (22 May 2022 13:42) (95% - 98%)    LABS/DIAGNOSTIC TESTS:                          11.5   10.31 )-----------( 193      ( 22 May 2022 08:15 )             34.9     WBC Count: 10.31 K/uL ( @ 08:15)  WBC Count: 13.16 K/uL ( @ 16:29)          138  |  103  |  14  ----------------------------<  122<H>  3.6   |  25  |  0.81    Ca    8.9      22 May 2022 08:15    TPro  7.0  /  Alb  2.7<L>  /  TBili  0.3  /  DBili  x   /  AST  18  /  ALT  21  /  AlkPhos  70        Urinalysis Basic - ( 22 May 2022 00:07 )    Color: Yellow / Appearance: Clear / S.015 / pH: x  Gluc: x / Ketone: Negative  / Bili: Negative / Urobili: Negative   Blood: x / Protein: 15 / Nitrite: Negative   Leuk Esterase: Negative / RBC: 2-5 /HPF / WBC 3-5 /HPF   Sq Epi: x / Non Sq Epi: Moderate /HPF / Bacteria: Trace /HPF        LIVER FUNCTIONS - ( 21 May 2022 16:29 )  Alb: 2.7 g/dL / Pro: 7.0 g/dL / ALK PHOS: 70 U/L / ALT: 21 U/L DA / AST: 18 U/L / GGT: x             PT/INR - ( 21 May 2022 20:52 )   PT: 14.4 sec;   INR: 1.21 ratio         PTT - ( 21 May 2022 20:52 )  PTT:34.0 sec    LACTATE:    ABG -     CULTURES:       RADIOLOGY:< from: CT Abdomen and Pelvis w/ Oral Cont (22 @ 19:35) >  ACC: [id number] EXAM:  CT ABDOMEN AND PELVIS OC                          PROCEDURE DATE:  2022          INTERPRETATION:  CLINICAL INFORMATION: Right lower quadrant abdominal pain    COMPARISON: 2018    CONTRAST/COMPLICATIONS:  IV Contrast: NONE  Oral Contrast: Omnipaque 300  Complications: None reported at time of study completion    PROCEDURE:  CT of the Abdomen and Pelvis was performed.  Sagittal and coronal reformats were performed.    FINDINGS:  LOWER CHEST: Within normal limits.    LIVER: Calcified granuloma in the right hepatic lobe.  BILE DUCTS: Postcholecystectomy biliary ductal dilatation.  GALLBLADDER: Cholecystectomy.  SPLEEN: Within normal limits.  PANCREAS: Within normal limits.  ADRENALS: Nodular thickening of the leftadrenal gland, likely in the   basis of adrenal adenoma. Right adrenal gland within normal limits.  KIDNEYS/URETERS: Bilateral nonobstructing renal stones.    BLADDER: Within normal limits.  REPRODUCTIVE ORGANS: Uterus and bilateral adnexa within normal limits.    BOWEL: No bowel obstruction. Diverticulosis.    Appendicolith at the base of the appendix with appendiceal dilatation and   mild periappendiceal stranding, concerning for acute appendicitis.    Oral contrast in the colon. Asymmetric wall thickening of the ascending   colon above the level of the ileocecal valve concerning for colonic mass.    PERITONEUM: Fluid collection collection inferior to the base of the cecum   measuring 5.4 x 2.6 x 8.2 cm, concerning for periappendiceal abscess.   Small amount of pneumoperitoneum.  VESSELS: Atherosclerotic calcifications.  RETROPERITONEUM/LYMPH NODES: No lymphadenopathy.  ABDOMINAL WALL: Within normal limits.  BONES: Degenerative changes.    IMPRESSION:  Findings concerning for perforatedacute appendicitis with collection   adjacent to the base of the appendix measuring 5.4 x 2.6 x 8.2 cm.    Wall thickening of the ascending colon above the level of the ileocecal   valve, concerning for neoplasm. Reactive change related to adjacent   appendicitis not excluded.        --- End of Report ---            BRITTANY MORAES MD; Attending Radiologist  This document has been electronically signed. May 21 2022  8:02PM    < end of copied text >        ROS  [  ] UNABLE TO ELICIT               HPI:  72 y/o female PMH DM, HTN, unsure of her meds;  PSH cholecystectomy  treated conservatively for poss appendicitis in 2018  c/o continued RLQ pain with n/v on and off since Thursday  no f/c      History as above, pt who presented with right lower abdominal pain x 2-3 days along with nausea , vomiting and some diarrhea at home but none here, she has not had any fevers or chills but had an elevated WBC count on admission that has decreased. She had a CT abdomen and it shows a perforated appendicitis with abscess. She is on Zosyn currently and is going to be evaluated by IR tomorrow for possible drainage.        PAST MEDICAL & SURGICAL HISTORY:  Essential hypertension      Diabetes      Hyperlipidemia      S/P cholecystectomy          No Known Allergies      Meds:  dextrose 5% + sodium chloride 0.45%. 1000 milliLiter(s) IV Continuous <Continuous>  dextrose 5%. 1000 milliLiter(s) IV Continuous <Continuous>  dextrose 5%. 1000 milliLiter(s) IV Continuous <Continuous>  dextrose 50% Injectable 25 Gram(s) IV Push once  dextrose 50% Injectable 12.5 Gram(s) IV Push once  dextrose 50% Injectable 25 Gram(s) IV Push once  dextrose Oral Gel 15 Gram(s) Oral once PRN  glucagon  Injectable 1 milliGRAM(s) IntraMuscular once  heparin   Injectable 5000 Unit(s) SubCutaneous every 8 hours  insulin lispro (ADMELOG) corrective regimen sliding scale   SubCutaneous three times a day before meals  morphine  - Injectable 2 milliGRAM(s) IV Push every 4 hours PRN  ondansetron Injectable 4 milliGRAM(s) IV Push every 6 hours PRN  piperacillin/tazobactam IVPB.. 3.375 Gram(s) IV Intermittent every 8 hours      SOCIAL HISTORY:  Smoker:  YES / NO        PACK YEARS:                         WHEN QUIT?  ETOH use:  YES / NO               FREQUENCY / QUANTITY:  Ilicit Drug use:  YES / NO  Occupation:  Assisted device use (Cane / Walker):  Live with:    FAMILY HISTORY:  Family history of diabetes mellitus (Father, Mother)    Family history of breast cancer in female (Aunt)        VITALS:  Vital Signs Last 24 Hrs  T(C): 37.1 (22 May 2022 13:42), Max: 37.6 (21 May 2022 20:29)  T(F): 98.7 (22 May 2022 13:42), Max: 99.6 (21 May 2022 20:29)  HR: 66 (22 May 2022 13:42) (66 - 77)  BP: 110/65 (22 May 2022 13:42) (103/63 - 119/60)  BP(mean): --  RR: 17 (22 May 2022 13:42) (16 - 19)  SpO2: 95% (22 May 2022 13:42) (95% - 98%)    LABS/DIAGNOSTIC TESTS:                          11.5   10.31 )-----------( 193      ( 22 May 2022 08:15 )             34.9     WBC Count: 10.31 K/uL ( @ 08:15)  WBC Count: 13.16 K/uL ( @ 16:29)          138  |  103  |  14  ----------------------------<  122<H>  3.6   |  25  |  0.81    Ca    8.9      22 May 2022 08:15    TPro  7.0  /  Alb  2.7<L>  /  TBili  0.3  /  DBili  x   /  AST  18  /  ALT  21  /  AlkPhos  70  05-      Urinalysis Basic - ( 22 May 2022 00:07 )    Color: Yellow / Appearance: Clear / S.015 / pH: x  Gluc: x / Ketone: Negative  / Bili: Negative / Urobili: Negative   Blood: x / Protein: 15 / Nitrite: Negative   Leuk Esterase: Negative / RBC: 2-5 /HPF / WBC 3-5 /HPF   Sq Epi: x / Non Sq Epi: Moderate /HPF / Bacteria: Trace /HPF        LIVER FUNCTIONS - ( 21 May 2022 16:29 )  Alb: 2.7 g/dL / Pro: 7.0 g/dL / ALK PHOS: 70 U/L / ALT: 21 U/L DA / AST: 18 U/L / GGT: x             PT/INR - ( 21 May 2022 20:52 )   PT: 14.4 sec;   INR: 1.21 ratio         PTT - ( 21 May 2022 20:52 )  PTT:34.0 sec    LACTATE:    ABG -     CULTURES:       RADIOLOGY:< from: CT Abdomen and Pelvis w/ Oral Cont (22 @ 19:35) >  ACC: [id number] EXAM:  CT ABDOMEN AND PELVIS OC                          PROCEDURE DATE:  2022          INTERPRETATION:  CLINICAL INFORMATION: Right lower quadrant abdominal pain    COMPARISON: 2018    CONTRAST/COMPLICATIONS:  IV Contrast: NONE  Oral Contrast: Omnipaque 300  Complications: None reported at time of study completion    PROCEDURE:  CT of the Abdomen and Pelvis was performed.  Sagittal and coronal reformats were performed.    FINDINGS:  LOWER CHEST: Within normal limits.    LIVER: Calcified granuloma in the right hepatic lobe.  BILE DUCTS: Postcholecystectomy biliary ductal dilatation.  GALLBLADDER: Cholecystectomy.  SPLEEN: Within normal limits.  PANCREAS: Within normal limits.  ADRENALS: Nodular thickening of the leftadrenal gland, likely in the   basis of adrenal adenoma. Right adrenal gland within normal limits.  KIDNEYS/URETERS: Bilateral nonobstructing renal stones.    BLADDER: Within normal limits.  REPRODUCTIVE ORGANS: Uterus and bilateral adnexa within normal limits.    BOWEL: No bowel obstruction. Diverticulosis.    Appendicolith at the base of the appendix with appendiceal dilatation and   mild periappendiceal stranding, concerning for acute appendicitis.    Oral contrast in the colon. Asymmetric wall thickening of the ascending   colon above the level of the ileocecal valve concerning for colonic mass.    PERITONEUM: Fluid collection collection inferior to the base of the cecum   measuring 5.4 x 2.6 x 8.2 cm, concerning for periappendiceal abscess.   Small amount of pneumoperitoneum.  VESSELS: Atherosclerotic calcifications.  RETROPERITONEUM/LYMPH NODES: No lymphadenopathy.  ABDOMINAL WALL: Within normal limits.  BONES: Degenerative changes.    IMPRESSION:  Findings concerning for perforatedacute appendicitis with collection   adjacent to the base of the appendix measuring 5.4 x 2.6 x 8.2 cm.    Wall thickening of the ascending colon above the level of the ileocecal   valve, concerning for neoplasm. Reactive change related to adjacent   appendicitis not excluded.        --- End of Report ---            BRITTANY MORAES MD; Attending Radiologist  This document has been electronically signed. May 21 2022  8:02PM    < end of copied text >        ROS  [  ] UNABLE TO ELICIT               HPI:  70 y/o female PMH DM, HTN, unsure of her meds;  PSH cholecystectomy  treated conservatively for poss appendicitis in 2018  c/o continued RLQ pain with n/v on and off since Thursday  no f/c      History as above, pt who presented with right lower abdominal pain x 2-3 days along with nausea , vomiting and some diarrhea at home but none here, she has not had any fevers or chills but had an elevated WBC count on admission that has decreased. She had a CT abdomen and it shows a perforated appendicitis with abscess. She is on Zosyn currently and is going to be evaluated by IR tomorrow for possible drainage. She states she is feeling a little better since being here but still has abdominal pain.        PAST MEDICAL & SURGICAL HISTORY:  Essential hypertension      Diabetes      Hyperlipidemia      S/P cholecystectomy          No Known Allergies      Meds:  dextrose 5% + sodium chloride 0.45%. 1000 milliLiter(s) IV Continuous <Continuous>  dextrose 5%. 1000 milliLiter(s) IV Continuous <Continuous>  dextrose 5%. 1000 milliLiter(s) IV Continuous <Continuous>  dextrose 50% Injectable 25 Gram(s) IV Push once  dextrose 50% Injectable 12.5 Gram(s) IV Push once  dextrose 50% Injectable 25 Gram(s) IV Push once  dextrose Oral Gel 15 Gram(s) Oral once PRN  glucagon  Injectable 1 milliGRAM(s) IntraMuscular once  heparin   Injectable 5000 Unit(s) SubCutaneous every 8 hours  insulin lispro (ADMELOG) corrective regimen sliding scale   SubCutaneous three times a day before meals  morphine  - Injectable 2 milliGRAM(s) IV Push every 4 hours PRN  ondansetron Injectable 4 milliGRAM(s) IV Push every 6 hours PRN  piperacillin/tazobactam IVPB.. 3.375 Gram(s) IV Intermittent every 8 hours      SOCIAL HISTORY:  Smoker:  no  ETOH use:  no      FAMILY HISTORY:  Family history of diabetes mellitus (Father, Mother)    Family history of breast cancer in female (Aunt)        VITALS:  Vital Signs Last 24 Hrs  T(C): 37.1 (22 May 2022 13:42), Max: 37.6 (21 May 2022 20:29)  T(F): 98.7 (22 May 2022 13:42), Max: 99.6 (21 May 2022 20:29)  HR: 66 (22 May 2022 13:42) (66 - 77)  BP: 110/65 (22 May 2022 13:42) (103/63 - 119/60)  BP(mean): --  RR: 17 (22 May 2022 13:42) (16 - 19)  SpO2: 95% (22 May 2022 13:42) (95% - 98%)    LABS/DIAGNOSTIC TESTS:                          11.5   10.31 )-----------( 193      ( 22 May 2022 08:15 )             34.9     WBC Count: 10.31 K/uL ( @ 08:15)  WBC Count: 13.16 K/uL ( @ 16:29)          138  |  103  |  14  ----------------------------<  122<H>  3.6   |  25  |  0.81    Ca    8.9      22 May 2022 08:15    TPro  7.0  /  Alb  2.7<L>  /  TBili  0.3  /  DBili  x   /  AST  18  /  ALT  21  /  AlkPhos  70        Urinalysis Basic - ( 22 May 2022 00:07 )    Color: Yellow / Appearance: Clear / S.015 / pH: x  Gluc: x / Ketone: Negative  / Bili: Negative / Urobili: Negative   Blood: x / Protein: 15 / Nitrite: Negative   Leuk Esterase: Negative / RBC: 2-5 /HPF / WBC 3-5 /HPF   Sq Epi: x / Non Sq Epi: Moderate /HPF / Bacteria: Trace /HPF        LIVER FUNCTIONS - ( 21 May 2022 16:29 )  Alb: 2.7 g/dL / Pro: 7.0 g/dL / ALK PHOS: 70 U/L / ALT: 21 U/L DA / AST: 18 U/L / GGT: x             PT/INR - ( 21 May 2022 20:52 )   PT: 14.4 sec;   INR: 1.21 ratio         PTT - ( 21 May 2022 20:52 )  PTT:34.0 sec    LACTATE:    ABG -     CULTURES:       RADIOLOGY:< from: CT Abdomen and Pelvis w/ Oral Cont (22 @ 19:35) >  ACC: [id number] EXAM:  CT ABDOMEN AND PELVIS OC                          PROCEDURE DATE:  2022          INTERPRETATION:  CLINICAL INFORMATION: Right lower quadrant abdominal pain    COMPARISON: 2018    CONTRAST/COMPLICATIONS:  IV Contrast: NONE  Oral Contrast: Omnipaque 300  Complications: None reported at time of study completion    PROCEDURE:  CT of the Abdomen and Pelvis was performed.  Sagittal and coronal reformats were performed.    FINDINGS:  LOWER CHEST: Within normal limits.    LIVER: Calcified granuloma in the right hepatic lobe.  BILE DUCTS: Postcholecystectomy biliary ductal dilatation.  GALLBLADDER: Cholecystectomy.  SPLEEN: Within normal limits.  PANCREAS: Within normal limits.  ADRENALS: Nodular thickening of the leftadrenal gland, likely in the   basis of adrenal adenoma. Right adrenal gland within normal limits.  KIDNEYS/URETERS: Bilateral nonobstructing renal stones.    BLADDER: Within normal limits.  REPRODUCTIVE ORGANS: Uterus and bilateral adnexa within normal limits.    BOWEL: No bowel obstruction. Diverticulosis.    Appendicolith at the base of the appendix with appendiceal dilatation and   mild periappendiceal stranding, concerning for acute appendicitis.    Oral contrast in the colon. Asymmetric wall thickening of the ascending   colon above the level of the ileocecal valve concerning for colonic mass.    PERITONEUM: Fluid collection collection inferior to the base of the cecum   measuring 5.4 x 2.6 x 8.2 cm, concerning for periappendiceal abscess.   Small amount of pneumoperitoneum.  VESSELS: Atherosclerotic calcifications.  RETROPERITONEUM/LYMPH NODES: No lymphadenopathy.  ABDOMINAL WALL: Within normal limits.  BONES: Degenerative changes.    IMPRESSION:  Findings concerning for perforatedacute appendicitis with collection   adjacent to the base of the appendix measuring 5.4 x 2.6 x 8.2 cm.    Wall thickening of the ascending colon above the level of the ileocecal   valve, concerning for neoplasm. Reactive change related to adjacent   appendicitis not excluded.        --- End of Report ---            BRITTANY MORAES MD; Attending Radiologist  This document has been electronically signed. May 21 2022  8:02PM    < end of copied text >        ROS  [  ] UNABLE TO ELICIT

## 2022-05-22 NOTE — PATIENT PROFILE ADULT - STATED REASON FOR ADMISSION
Right lower quadrant pain for 3 days now. I had nausea and vomiting because my doctor prescribed me an antibiotic, and I vomited because of that.

## 2022-05-23 LAB
ANION GAP SERPL CALC-SCNC: 7 MMOL/L — SIGNIFICANT CHANGE UP (ref 5–17)
BUN SERPL-MCNC: 6 MG/DL — LOW (ref 7–18)
CALCIUM SERPL-MCNC: 8.8 MG/DL — SIGNIFICANT CHANGE UP (ref 8.4–10.5)
CHLORIDE SERPL-SCNC: 104 MMOL/L — SIGNIFICANT CHANGE UP (ref 96–108)
CO2 SERPL-SCNC: 26 MMOL/L — SIGNIFICANT CHANGE UP (ref 22–31)
CREAT SERPL-MCNC: 0.7 MG/DL — SIGNIFICANT CHANGE UP (ref 0.5–1.3)
CULTURE RESULTS: NO GROWTH — SIGNIFICANT CHANGE UP
EGFR: 92 ML/MIN/1.73M2 — SIGNIFICANT CHANGE UP
GLUCOSE BLDC GLUCOMTR-MCNC: 129 MG/DL — HIGH (ref 70–99)
GLUCOSE BLDC GLUCOMTR-MCNC: 131 MG/DL — HIGH (ref 70–99)
GLUCOSE BLDC GLUCOMTR-MCNC: 134 MG/DL — HIGH (ref 70–99)
GLUCOSE BLDC GLUCOMTR-MCNC: 145 MG/DL — HIGH (ref 70–99)
GLUCOSE BLDC GLUCOMTR-MCNC: 156 MG/DL — HIGH (ref 70–99)
GLUCOSE SERPL-MCNC: 142 MG/DL — HIGH (ref 70–99)
HCT VFR BLD CALC: 35.7 % — SIGNIFICANT CHANGE UP (ref 34.5–45)
HCV RNA FLD QL NAA+PROBE: SIGNIFICANT CHANGE UP
HCV RNA SPEC QL PROBE+SIG AMP: SIGNIFICANT CHANGE UP
HGB BLD-MCNC: 11.7 G/DL — SIGNIFICANT CHANGE UP (ref 11.5–15.5)
MCHC RBC-ENTMCNC: 28.4 PG — SIGNIFICANT CHANGE UP (ref 27–34)
MCHC RBC-ENTMCNC: 32.8 GM/DL — SIGNIFICANT CHANGE UP (ref 32–36)
MCV RBC AUTO: 86.7 FL — SIGNIFICANT CHANGE UP (ref 80–100)
NRBC # BLD: 0 /100 WBCS — SIGNIFICANT CHANGE UP (ref 0–0)
PLATELET # BLD AUTO: 201 K/UL — SIGNIFICANT CHANGE UP (ref 150–400)
POTASSIUM SERPL-MCNC: 3.5 MMOL/L — SIGNIFICANT CHANGE UP (ref 3.5–5.3)
POTASSIUM SERPL-SCNC: 3.5 MMOL/L — SIGNIFICANT CHANGE UP (ref 3.5–5.3)
RBC # BLD: 4.12 M/UL — SIGNIFICANT CHANGE UP (ref 3.8–5.2)
RBC # FLD: 14.5 % — SIGNIFICANT CHANGE UP (ref 10.3–14.5)
SODIUM SERPL-SCNC: 137 MMOL/L — SIGNIFICANT CHANGE UP (ref 135–145)
SPECIMEN SOURCE: SIGNIFICANT CHANGE UP
WBC # BLD: 11.42 K/UL — HIGH (ref 3.8–10.5)
WBC # FLD AUTO: 11.42 K/UL — HIGH (ref 3.8–10.5)

## 2022-05-23 PROCEDURE — 99232 SBSQ HOSP IP/OBS MODERATE 35: CPT

## 2022-05-23 PROCEDURE — 74177 CT ABD & PELVIS W/CONTRAST: CPT | Mod: 26

## 2022-05-23 RX ORDER — DIATRIZOATE MEGLUMINE 180 MG/ML
30 INJECTION, SOLUTION INTRAVESICAL ONCE
Refills: 0 | Status: DISCONTINUED | OUTPATIENT
Start: 2022-05-23 | End: 2022-05-26

## 2022-05-23 RX ORDER — IOHEXOL 300 MG/ML
30 INJECTION, SOLUTION INTRAVENOUS ONCE
Refills: 0 | Status: DISCONTINUED | OUTPATIENT
Start: 2022-05-23 | End: 2022-05-23

## 2022-05-23 RX ORDER — DIATRIZOATE MEGLUMINE 180 MG/ML
30 INJECTION, SOLUTION INTRAVESICAL ONCE
Refills: 0 | Status: DISCONTINUED | OUTPATIENT
Start: 2022-05-23 | End: 2022-05-23

## 2022-05-23 RX ADMIN — PIPERACILLIN AND TAZOBACTAM 25 GRAM(S): 4; .5 INJECTION, POWDER, LYOPHILIZED, FOR SOLUTION INTRAVENOUS at 13:02

## 2022-05-23 RX ADMIN — MORPHINE SULFATE 2 MILLIGRAM(S): 50 CAPSULE, EXTENDED RELEASE ORAL at 19:35

## 2022-05-23 RX ADMIN — SODIUM CHLORIDE 125 MILLILITER(S): 9 INJECTION, SOLUTION INTRAVENOUS at 07:45

## 2022-05-23 RX ADMIN — SODIUM CHLORIDE 125 MILLILITER(S): 9 INJECTION, SOLUTION INTRAVENOUS at 21:29

## 2022-05-23 RX ADMIN — MORPHINE SULFATE 2 MILLIGRAM(S): 50 CAPSULE, EXTENDED RELEASE ORAL at 07:45

## 2022-05-23 RX ADMIN — MORPHINE SULFATE 2 MILLIGRAM(S): 50 CAPSULE, EXTENDED RELEASE ORAL at 15:25

## 2022-05-23 RX ADMIN — MORPHINE SULFATE 2 MILLIGRAM(S): 50 CAPSULE, EXTENDED RELEASE ORAL at 03:31

## 2022-05-23 RX ADMIN — PIPERACILLIN AND TAZOBACTAM 25 GRAM(S): 4; .5 INJECTION, POWDER, LYOPHILIZED, FOR SOLUTION INTRAVENOUS at 21:30

## 2022-05-23 RX ADMIN — MORPHINE SULFATE 2 MILLIGRAM(S): 50 CAPSULE, EXTENDED RELEASE ORAL at 22:23

## 2022-05-23 RX ADMIN — Medication 2: at 18:36

## 2022-05-23 RX ADMIN — MORPHINE SULFATE 2 MILLIGRAM(S): 50 CAPSULE, EXTENDED RELEASE ORAL at 08:15

## 2022-05-23 RX ADMIN — PIPERACILLIN AND TAZOBACTAM 25 GRAM(S): 4; .5 INJECTION, POWDER, LYOPHILIZED, FOR SOLUTION INTRAVENOUS at 05:13

## 2022-05-23 RX ADMIN — MORPHINE SULFATE 2 MILLIGRAM(S): 50 CAPSULE, EXTENDED RELEASE ORAL at 21:37

## 2022-05-23 RX ADMIN — SODIUM CHLORIDE 125 MILLILITER(S): 9 INJECTION, SOLUTION INTRAVENOUS at 02:25

## 2022-05-23 RX ADMIN — MORPHINE SULFATE 2 MILLIGRAM(S): 50 CAPSULE, EXTENDED RELEASE ORAL at 03:46

## 2022-05-23 NOTE — DIETITIAN INITIAL EVALUATION ADULT - PERTINENT MEDS FT
MEDICATIONS  (STANDING):  dextrose 5% + sodium chloride 0.45%. 1000 milliLiter(s) (125 mL/Hr) IV Continuous <Continuous>  dextrose 5%. 1000 milliLiter(s) (50 mL/Hr) IV Continuous <Continuous>  dextrose 5%. 1000 milliLiter(s) (100 mL/Hr) IV Continuous <Continuous>  dextrose 50% Injectable 25 Gram(s) IV Push once  dextrose 50% Injectable 12.5 Gram(s) IV Push once  dextrose 50% Injectable 25 Gram(s) IV Push once  diatrizoate meglumine/diatrizoate sodium. 30 milliLiter(s) Oral once  glucagon  Injectable 1 milliGRAM(s) IntraMuscular once  insulin lispro (ADMELOG) corrective regimen sliding scale   SubCutaneous three times a day before meals  piperacillin/tazobactam IVPB.. 3.375 Gram(s) IV Intermittent every 8 hours    MEDICATIONS  (PRN):  dextrose Oral Gel 15 Gram(s) Oral once PRN Blood Glucose LESS THAN 70 milliGRAM(s)/deciliter  morphine  - Injectable 2 milliGRAM(s) IV Push every 4 hours PRN Moderate Pain (4 - 6)  ondansetron Injectable 4 milliGRAM(s) IV Push every 6 hours PRN Nausea and/or Vomiting

## 2022-05-23 NOTE — DIETITIAN INITIAL EVALUATION ADULT - ADD RECOMMEND
Advance diet with nutrition supplement or may consider alternate nutrition support as medically feasible

## 2022-05-23 NOTE — DIETITIAN INITIAL EVALUATION ADULT - OTHER INFO
Patient from home admitted for abdominal pain x3days. Pt. visited, alert but complains of "pain & very hungry", received pain meds, awaiting IR for  Patient from home admitted for abdominal pain x3days. Pt. visited, alert but complains of "pain & very hungry", received pain meds, NPO with IV fluids, IR consult for drainage for perforated appendicitis with abscess, possible today? Surgery following & d/w nursing.

## 2022-05-23 NOTE — DIETITIAN INITIAL EVALUATION ADULT - PERTINENT LABORATORY DATA
05-23    137  |  104  |  6<L>  ----------------------------<  142<H>  3.5   |  26  |  0.70    Ca    8.8      23 May 2022 07:21    TPro  7.0  /  Alb  2.7<L>  /  TBili  0.3  /  DBili  x   /  AST  18  /  ALT  21  /  AlkPhos  70  05-21  POCT Blood Glucose.: 134 mg/dL (05-23-22 @ 11:14)  A1C with Estimated Average Glucose Result: 6.7 % (05-22-22 @ 10:58)

## 2022-05-23 NOTE — PROGRESS NOTE ADULT - ASSESSMENT
71 yoF with perforated appendicitis with abscess  borderline febrile, on zosyn   vss; labs wnl    - NPO, IVF   - zosyn, f/u ID recs   - serial abdominal exams   - IR consult for drainage  - dvt/gi ppx, pain control, oob/ambulate  - discussed and agreed with Dr. Vee  71 yoF with perforated appendicitis with abscess  borderline febrile, on zosyn   vss; labs wnl    - NPO, IVF   - zosyn, f/u ID recs   - serial abdominal exams   - IR consult for drainage  - f/u with patients GI for colonoscopy results   - dvt/gi ppx, pain control, oob/ambulate  - discussed and agreed with Dr. Vee

## 2022-05-23 NOTE — CONSULT NOTE ADULT - SUBJECTIVE AND OBJECTIVE BOX
This is 70 yo female PMHx DM and HTN. She has had a cholecystectomy.  Treated conservatively for possible appendicitis back in 2018,but then presented to the emergency room complaining of RLQ pain, with associated nasuea and vomitting since last week.  She denied any fever or chills.    Once admitted a CT of the Abd/Pelvis without IV contrast was done, which showed findings that was concerning for an acute perforated appendicitis with an adjacent collection.      IR was consulted for drainage    PAST MEDICAL & SURGICAL HISTORY:  Essential hypertension  Diabetes  Hyperlipidemia  Cholecystectomy    Home Medications:  baclofen 10 mg oral tablet: 1 tab(s) orally 2 times a day, As needed, back pain or spasm (09 Apr 2018 16:04)  brimonidine 0.2% ophthalmic solution: 1 drop(s) to each affected eye once a day (09 Apr 2018 16:04)  fenofibrate 48 mg oral tablet: 1 tab(s) orally once a day (at bedtime) (09 Apr 2018 16:04)  latanoprost 0.005% ophthalmic solution: 1 drop(s) to each affected eye once a day (at bedtime) (09 Apr 2018 16:04)  metoprolol succinate 50 mg oral tablet, extended release: 1 tab(s) orally once a day (09 Apr 2018 15:58)  pregabalin 50 mg oral capsule: 1 cap(s) orally 2 times a day (09 Apr 2018 15:58)  simvastatin 10 mg oral tablet: 1 tab(s) orally once a day (at bedtime) (09 Apr 2018 15:58)      MEDICATIONS  (STANDING):  diatrizoate meglumine/diatrizoate sodium. 30 milliLiter(s) Oral once  glucagon  Injectable 1 milliGRAM(s) IntraMuscular once  insulin lispro (ADMELOG) corrective regimen sliding scale   SubCutaneous three times a day before meals  piperacillin/tazobactam IVPB.. 3.375 Gram(s) IV Intermittent every 8 hours    MEDICATIONS  (PRN):  dextrose Oral Gel 15 Gram(s) Oral once PRN Blood Glucose LESS THAN 70 milliGRAM(s)/deciliter  morphine  - Injectable 2 milliGRAM(s) IV Push every 4 hours PRN Moderate Pain (4 - 6)  ondansetron Injectable 4 milliGRAM(s) IV Push every 6 hours PRN Nausea and/or Vomiting      Vital Signs Last 24 Hrs  T(C): 37.4 (23 May 2022 05:15), Max: 37.4 (23 May 2022 05:15)  T(F): 99.3 (23 May 2022 05:15), Max: 99.3 (23 May 2022 05:15)  HR: 73 (23 May 2022 05:15) (66 - 76)  BP: 118/59 (23 May 2022 05:15) (110/65 - 120/69)  BP(mean): --  RR: 17 (23 May 2022 05:15) (17 - 17)  SpO2: 95% (23 May 2022 05:15) (95% - 96%)                            11.7   11.42 )-----------( 201      ( 23 May 2022 07:21 )             35.7     05-23    137  |  104  |  6<L>  ----------------------------<  142<H>  3.5   |  26  |  0.70    Ca    8.8      23 May 2022 07:21    TPro  7.0  /  Alb  2.7<L>  /  TBili  0.3  /  DBili  x   /  AST  18  /  ALT  21  /  AlkPhos  70  05-21      CT Abdomen and Pelvis w/ Oral Cont (05.21.22 @ 19:35) >  IMPRESSION:  Findings concerning for perforated acute appendicitis with collection   adjacent to the base of the appendix measuring 5.4 x 2.6 x 8.2 cm.    Wall thickening of the ascending colon above the level of the ileocecal   valve, concerning for neoplasm. Reactive change related to adjacent   appendicitis not excluded.    < end of copied text >

## 2022-05-24 LAB
ANION GAP SERPL CALC-SCNC: 7 MMOL/L — SIGNIFICANT CHANGE UP (ref 5–17)
BUN SERPL-MCNC: 5 MG/DL — LOW (ref 7–18)
CALCIUM SERPL-MCNC: 8.5 MG/DL — SIGNIFICANT CHANGE UP (ref 8.4–10.5)
CHLORIDE SERPL-SCNC: 104 MMOL/L — SIGNIFICANT CHANGE UP (ref 96–108)
CO2 SERPL-SCNC: 26 MMOL/L — SIGNIFICANT CHANGE UP (ref 22–31)
CREAT SERPL-MCNC: 0.62 MG/DL — SIGNIFICANT CHANGE UP (ref 0.5–1.3)
EGFR: 95 ML/MIN/1.73M2 — SIGNIFICANT CHANGE UP
GLUCOSE BLDC GLUCOMTR-MCNC: 113 MG/DL — HIGH (ref 70–99)
GLUCOSE BLDC GLUCOMTR-MCNC: 123 MG/DL — HIGH (ref 70–99)
GLUCOSE BLDC GLUCOMTR-MCNC: 132 MG/DL — HIGH (ref 70–99)
GLUCOSE BLDC GLUCOMTR-MCNC: 181 MG/DL — HIGH (ref 70–99)
GLUCOSE SERPL-MCNC: 138 MG/DL — HIGH (ref 70–99)
HCT VFR BLD CALC: 37.3 % — SIGNIFICANT CHANGE UP (ref 34.5–45)
HGB BLD-MCNC: 12.2 G/DL — SIGNIFICANT CHANGE UP (ref 11.5–15.5)
MCHC RBC-ENTMCNC: 28.7 PG — SIGNIFICANT CHANGE UP (ref 27–34)
MCHC RBC-ENTMCNC: 32.7 GM/DL — SIGNIFICANT CHANGE UP (ref 32–36)
MCV RBC AUTO: 87.8 FL — SIGNIFICANT CHANGE UP (ref 80–100)
NRBC # BLD: 0 /100 WBCS — SIGNIFICANT CHANGE UP (ref 0–0)
PLATELET # BLD AUTO: 212 K/UL — SIGNIFICANT CHANGE UP (ref 150–400)
POTASSIUM SERPL-MCNC: 3.5 MMOL/L — SIGNIFICANT CHANGE UP (ref 3.5–5.3)
POTASSIUM SERPL-SCNC: 3.5 MMOL/L — SIGNIFICANT CHANGE UP (ref 3.5–5.3)
RBC # BLD: 4.25 M/UL — SIGNIFICANT CHANGE UP (ref 3.8–5.2)
RBC # FLD: 14.6 % — HIGH (ref 10.3–14.5)
SODIUM SERPL-SCNC: 137 MMOL/L — SIGNIFICANT CHANGE UP (ref 135–145)
WBC # BLD: 10.63 K/UL — HIGH (ref 3.8–10.5)
WBC # FLD AUTO: 10.63 K/UL — HIGH (ref 3.8–10.5)

## 2022-05-24 PROCEDURE — 99232 SBSQ HOSP IP/OBS MODERATE 35: CPT

## 2022-05-24 RX ORDER — HEPARIN SODIUM 5000 [USP'U]/ML
5000 INJECTION INTRAVENOUS; SUBCUTANEOUS EVERY 8 HOURS
Refills: 0 | Status: COMPLETED | OUTPATIENT
Start: 2022-05-24 | End: 2022-05-24

## 2022-05-24 RX ORDER — LANOLIN ALCOHOL/MO/W.PET/CERES
3 CREAM (GRAM) TOPICAL AT BEDTIME
Refills: 0 | Status: DISCONTINUED | OUTPATIENT
Start: 2022-05-24 | End: 2022-05-26

## 2022-05-24 RX ADMIN — HEPARIN SODIUM 5000 UNIT(S): 5000 INJECTION INTRAVENOUS; SUBCUTANEOUS at 14:00

## 2022-05-24 RX ADMIN — Medication 3 MILLIGRAM(S): at 00:15

## 2022-05-24 RX ADMIN — SODIUM CHLORIDE 125 MILLILITER(S): 9 INJECTION, SOLUTION INTRAVENOUS at 20:17

## 2022-05-24 RX ADMIN — MORPHINE SULFATE 2 MILLIGRAM(S): 50 CAPSULE, EXTENDED RELEASE ORAL at 20:32

## 2022-05-24 RX ADMIN — MORPHINE SULFATE 2 MILLIGRAM(S): 50 CAPSULE, EXTENDED RELEASE ORAL at 02:25

## 2022-05-24 RX ADMIN — MORPHINE SULFATE 2 MILLIGRAM(S): 50 CAPSULE, EXTENDED RELEASE ORAL at 14:33

## 2022-05-24 RX ADMIN — PIPERACILLIN AND TAZOBACTAM 25 GRAM(S): 4; .5 INJECTION, POWDER, LYOPHILIZED, FOR SOLUTION INTRAVENOUS at 06:25

## 2022-05-24 RX ADMIN — PIPERACILLIN AND TAZOBACTAM 25 GRAM(S): 4; .5 INJECTION, POWDER, LYOPHILIZED, FOR SOLUTION INTRAVENOUS at 14:03

## 2022-05-24 RX ADMIN — PIPERACILLIN AND TAZOBACTAM 25 GRAM(S): 4; .5 INJECTION, POWDER, LYOPHILIZED, FOR SOLUTION INTRAVENOUS at 21:13

## 2022-05-24 RX ADMIN — MORPHINE SULFATE 2 MILLIGRAM(S): 50 CAPSULE, EXTENDED RELEASE ORAL at 01:52

## 2022-05-24 RX ADMIN — SODIUM CHLORIDE 125 MILLILITER(S): 9 INJECTION, SOLUTION INTRAVENOUS at 11:57

## 2022-05-24 RX ADMIN — HEPARIN SODIUM 5000 UNIT(S): 5000 INJECTION INTRAVENOUS; SUBCUTANEOUS at 21:13

## 2022-05-24 RX ADMIN — MORPHINE SULFATE 2 MILLIGRAM(S): 50 CAPSULE, EXTENDED RELEASE ORAL at 06:56

## 2022-05-24 RX ADMIN — MORPHINE SULFATE 2 MILLIGRAM(S): 50 CAPSULE, EXTENDED RELEASE ORAL at 20:17

## 2022-05-24 RX ADMIN — MORPHINE SULFATE 2 MILLIGRAM(S): 50 CAPSULE, EXTENDED RELEASE ORAL at 06:27

## 2022-05-24 RX ADMIN — MORPHINE SULFATE 2 MILLIGRAM(S): 50 CAPSULE, EXTENDED RELEASE ORAL at 14:03

## 2022-05-24 RX ADMIN — Medication 2: at 17:11

## 2022-05-24 NOTE — PROGRESS NOTE ADULT - ASSESSMENT
Acute Appendicitis with perforation  Intraabdominal abscess  Leukocytosis - normalized    Plan - Cont Zosyn 3.375 gms iv q8hrs  will likely need a Midline or PICC line to get IV antibiotics at home for approx 3 weeks. Acute Appendicitis with perforation  Intraabdominal abscess  Leukocytosis - normalized    Plan - Cont Zosyn 3.375 gms iv q8hrs  IR drainage tomorrow  will likely need a Midline or PICC line to get IV antibiotics at home for approx 3 weeks.

## 2022-05-24 NOTE — PROGRESS NOTE ADULT - SUBJECTIVE AND OBJECTIVE BOX
INTERVAL HPI/OVERNIGHT EVENTS:  Patient seen and examined at bedside.   Pt resting comfortably. No acute complaints.   Tolerating regular diet. Denies N/V  Admits to BM/ Flatus/ voiding   States abdominal pain is the same as yesterday and not   Patient denies chest pain, shortness of breath, fever, chills.     MEDICATIONS  (STANDING):  dextrose 5% + sodium chloride 0.45%. 1000 milliLiter(s) (125 mL/Hr) IV Continuous <Continuous>  dextrose 5%. 1000 milliLiter(s) (50 mL/Hr) IV Continuous <Continuous>  dextrose 5%. 1000 milliLiter(s) (100 mL/Hr) IV Continuous <Continuous>  dextrose 50% Injectable 25 Gram(s) IV Push once  dextrose 50% Injectable 12.5 Gram(s) IV Push once  dextrose 50% Injectable 25 Gram(s) IV Push once  diatrizoate meglumine/diatrizoate sodium. 30 milliLiter(s) Oral once  glucagon  Injectable 1 milliGRAM(s) IntraMuscular once  insulin lispro (ADMELOG) corrective regimen sliding scale   SubCutaneous three times a day before meals  melatonin 3 milliGRAM(s) Oral at bedtime  piperacillin/tazobactam IVPB.. 3.375 Gram(s) IV Intermittent every 8 hours    MEDICATIONS  (PRN):  dextrose Oral Gel 15 Gram(s) Oral once PRN Blood Glucose LESS THAN 70 milliGRAM(s)/deciliter  morphine  - Injectable 2 milliGRAM(s) IV Push every 4 hours PRN Moderate Pain (4 - 6)  ondansetron Injectable 4 milliGRAM(s) IV Push every 6 hours PRN Nausea and/or Vomiting      Vital Signs Last 24 Hrs  T(C): 37.5 (24 May 2022 05:25), Max: 37.5 (24 May 2022 05:25)  T(F): 99.5 (24 May 2022 05:25), Max: 99.5 (24 May 2022 05:25)  HR: 73 (24 May 2022 05:25) (63 - 76)  BP: 100/60 (24 May 2022 05:25) (100/60 - 122/60)  BP(mean): --  RR: 17 (24 May 2022 05:25) (17 - 18)  SpO2: 95% (24 May 2022 05:25) (95% - 99%)    Physical:  General: A&Ox3. NAD.  Respiratory: non labored  Skin: warm  Abdomen: Soft, nondistended, mild tenderness by RLQ, no rebound tenderness, no guarding, no palpable masses,   Extremities: non edematous, no calf pain bilaterally    I&O's Detail      LABS:                        12.2   10.63 )-----------( 212      ( 24 May 2022 07:13 )             37.3             05-24    137  |  104  |  5<L>  ----------------------------<  138<H>  3.5   |  26  |  0.62    Ca    8.5      24 May 2022 07:13          71y.o. Female s/p POD #    - NPO (then serial abdominal exams)   - IVF (if NPO)   - Advance to --- diet  - IV abx     - Pain control, Anti emetic, Anti pyretic PRN   - DVT ppx, ambulate as tolerated, OOB   - Incentive spirometer      - discussed and confirmed with      - ID recommendation appreciated (when they put in rec)  - follow up ID recommendation (need one)     INTERVAL HPI/OVERNIGHT EVENTS:  Patient seen and examined at bedside.   Pt resting comfortably. No acute complaints.   Tolerating regular diet. Denies N/V  Admits to BM/ Flatus/ voiding   States abdominal pain is the same as yesterday.   Patient denies chest pain, shortness of breath, fever, chills.     MEDICATIONS  (STANDING):  dextrose 5% + sodium chloride 0.45%. 1000 milliLiter(s) (125 mL/Hr) IV Continuous <Continuous>  dextrose 5%. 1000 milliLiter(s) (50 mL/Hr) IV Continuous <Continuous>  dextrose 5%. 1000 milliLiter(s) (100 mL/Hr) IV Continuous <Continuous>  dextrose 50% Injectable 25 Gram(s) IV Push once  dextrose 50% Injectable 12.5 Gram(s) IV Push once  dextrose 50% Injectable 25 Gram(s) IV Push once  diatrizoate meglumine/diatrizoate sodium. 30 milliLiter(s) Oral once  glucagon  Injectable 1 milliGRAM(s) IntraMuscular once  insulin lispro (ADMELOG) corrective regimen sliding scale   SubCutaneous three times a day before meals  melatonin 3 milliGRAM(s) Oral at bedtime  piperacillin/tazobactam IVPB.. 3.375 Gram(s) IV Intermittent every 8 hours    MEDICATIONS  (PRN):  dextrose Oral Gel 15 Gram(s) Oral once PRN Blood Glucose LESS THAN 70 milliGRAM(s)/deciliter  morphine  - Injectable 2 milliGRAM(s) IV Push every 4 hours PRN Moderate Pain (4 - 6)  ondansetron Injectable 4 milliGRAM(s) IV Push every 6 hours PRN Nausea and/or Vomiting      Vital Signs Last 24 Hrs  T(C): 37.5 (24 May 2022 05:25), Max: 37.5 (24 May 2022 05:25)  T(F): 99.5 (24 May 2022 05:25), Max: 99.5 (24 May 2022 05:25)  HR: 73 (24 May 2022 05:25) (63 - 76)  BP: 100/60 (24 May 2022 05:25) (100/60 - 122/60)  BP(mean): --  RR: 17 (24 May 2022 05:25) (17 - 18)  SpO2: 95% (24 May 2022 05:25) (95% - 99%)    Physical:  General: A&Ox3. NAD.  Respiratory: non labored  Skin: warm  Abdomen: Soft, nondistended, mild tenderness by RLQ, no rebound tenderness, no guarding, no palpable masses,   Extremities: non edematous, no calf pain bilaterally    I&O's Detail      LABS:                        12.2   10.63 )-----------( 212      ( 24 May 2022 07:13 )             37.3             05-24    137  |  104  |  5<L>  ----------------------------<  138<H>  3.5   |  26  |  0.62    Ca    8.5      24 May 2022 07:13

## 2022-05-24 NOTE — PROGRESS NOTE ADULT - ASSESSMENT
71 yoF with perforated appendicitis with abscess  borderline febrile, on zosyn   vss; labs wnl, wbc downtrending  patient seen and examined at bedside with Dr. Vee     - regular diet, advance as tolerated  - NPO, IVF at midnight   - zosyn, f/u ID recs   - serial abdominal exams   - IR for drainage tomorrow 5/25  - f/u with patients GI for colonoscopy results   - dvt/gi ppx, pain control, oob/ambulate  - discussed and agreed with Dr. Vee

## 2022-05-24 NOTE — PROGRESS NOTE ADULT - SUBJECTIVE AND OBJECTIVE BOX
71y Female is under our care for     REVIEW OF SYSTEMS:  [  ] Not able to elicit      MEDS:  piperacillin/tazobactam IVPB.. 3.375 Gram(s) IV Intermittent every 8 hours    ALLERGIES: Allergies    No Known Allergies    Intolerances        VITALS:  Vital Signs Last 24 Hrs  T(C): 37.5 (24 May 2022 05:25), Max: 37.5 (24 May 2022 05:25)  T(F): 99.5 (24 May 2022 05:25), Max: 99.5 (24 May 2022 05:25)  HR: 73 (24 May 2022 05:25) (63 - 76)  BP: 100/60 (24 May 2022 05:25) (100/60 - 122/60)  BP(mean): --  RR: 17 (24 May 2022 05:25) (17 - 18)  SpO2: 95% (24 May 2022 05:25) (95% - 99%)      PHYSICAL EXAM:      LABS/DIAGNOSTIC TESTS:                        12.2   10.63 )-----------( 212      ( 24 May 2022 07:13 )             37.3     WBC Count: 10.63 K/uL (05-24 @ 07:13)  WBC Count: 11.42 K/uL (05-23 @ 07:21)  WBC Count: 10.31 K/uL (05-22 @ 08:15)  WBC Count: 13.16 K/uL (05-21 @ 16:29)    05-24    137  |  104  |  5<L>  ----------------------------<  138<H>  3.5   |  26  |  0.62    Ca    8.5      24 May 2022 07:13        CULTURES:   Clean Catch Clean Catch (Midstream)  05-22 @ 08:25   No growth  --  --      .Blood Blood-Peripheral  05-22 @ 02:24   No growth to date.  --  --        RADIOLOGY:    < from: CT Abdomen and Pelvis w/ IV Cont (05.23.22 @ 15:32) >    ACC: [id number] EXAM:  CT ABDOMEN AND PELVIS IC                          PROCEDURE DATE:  05/23/2022          INTERPRETATION:  CLINICAL INFORMATION: 71 years  Female with perforated   appendicitis.    COMPARISON: 5/21/2022    CONTRAST/COMPLICATIONS:  IV Contrast: Omnipaque 350  90 cc administered   10 cc discarded  Oral Contrast: NONE  Complications: None reported at time of study completion    PROCEDURE:  CT of the Abdomen and Pelvis was performed.  Sagittal and coronal reformats were performed.    FINDINGS:  LOWER CHEST: Developing small bilateral pleural effusions. Bilateral   groundglass opacities secondary to motion artifact, pulmonary edema or   pneumonia.    LIVER: Within normal limits.  BILE DUCTS: Stable common duct distention to 1.3cm.  GALLBLADDER: Cholecystectomy.  SPLEEN: Within normal limits.  PANCREAS: 1.8 x 1.1 cm pancreatic head cyst.  ADRENALS: Stable nodular thickening left adrenal gland. Normal right   adrenal gland.  KIDNEYS/URETERS: Punctate bilateral nonobstructingintrarenal calculi. No   hydronephrosis. Stable right extrarenal pelvis.    BLADDER: Within normal limits.  REPRODUCTIVE ORGANS: Prominent uterus and left ovary.    BOWEL: No bowel obstruction. Dilated fluid-filled appendix reidentified   measuring up to 8.4 mm. Irregular cecal wall thickening. Redemonstrated   collection adjacent to the cecal base (2:108 and 4:26) with air-fluid   level measuring 5.5 x 3.4 x 9.1 cm. Inflammatory stranding around the   collection. There may be an appendicolith within the dependent portion   (2:119). There is a more cephalad collection which may be contiguous with   the pericecal collection measuring 4.9 x 2.3 x 2.6 cm also containing a   droplet of air (2:80 and 4:32). Reidentified is irregular cecal wall   thickening. 4.6 cm duodenal diverticula.  PERITONEUM: Pericecal collections. Please see bowel findings. Focal   droplets of extraluminal air reidentified.  VESSELS: Atherosclerotic changes.  RETROPERITONEUM/LYMPH NODES: No lymphadenopathy.  ABDOMINAL WALL: Right lower quadrant subcutaneous edema.  BONES: Degenerative changes.    IMPRESSION:    Redemonstrated perforated acute appendicitis.    5.5 x 3.4 x 9.1 cm collection adjacent to the cecal base with air-fluid   level, likely abscess and possiblycontaining appendicolith. Adjacent   cephalad collection measuring 4.9 x 2.3 x 2.6 cm. With the benefit of IV   contrast on the current study, the collections have not changed   significantly in size. Dilated appendix reidentified.    Irregular wall thickening of the cecum reidentified. Cannot exclude   underlying neoplasm.    Right lower quadrant subcutaneous edema. Rule out cellulitis.    Developing small bilateral pleural effusions. Bilateral opacities   secondary to motion artifact, pneumoniaor pulmonary edema.    1.8 x 1.1 cm pancreatic head cyst. Recommend follow-up MRI to evaluate   for IPMN or other pathology.    Prominent uterus and left ovary. Recommend ultrasound.        --- End of Report ---            TESHA MORALES MD; Attending Radiologist  This document has been electronically signed. May 23 2022  4:27PM    < end of copied text >   71y Female is under our care for Acute Appendicitis with perforation with Intraabdominal abscesses.  Patient was seen laying comfortably in bed with no acute distress.  Patient reports     REVIEW OF SYSTEMS:  [  ] Not able to elicit      MEDS:  piperacillin/tazobactam IVPB.. 3.375 Gram(s) IV Intermittent every 8 hours    ALLERGIES: Allergies    No Known Allergies    Intolerances        VITALS:  Vital Signs Last 24 Hrs  T(C): 37.5 (24 May 2022 05:25), Max: 37.5 (24 May 2022 05:25)  T(F): 99.5 (24 May 2022 05:25), Max: 99.5 (24 May 2022 05:25)  HR: 73 (24 May 2022 05:25) (63 - 76)  BP: 100/60 (24 May 2022 05:25) (100/60 - 122/60)  BP(mean): --  RR: 17 (24 May 2022 05:25) (17 - 18)  SpO2: 95% (24 May 2022 05:25) (95% - 99%)      PHYSICAL EXAM:      LABS/DIAGNOSTIC TESTS:                        12.2   10.63 )-----------( 212      ( 24 May 2022 07:13 )             37.3     WBC Count: 10.63 K/uL (05-24 @ 07:13)  WBC Count: 11.42 K/uL (05-23 @ 07:21)  WBC Count: 10.31 K/uL (05-22 @ 08:15)  WBC Count: 13.16 K/uL (05-21 @ 16:29)    05-24    137  |  104  |  5<L>  ----------------------------<  138<H>  3.5   |  26  |  0.62    Ca    8.5      24 May 2022 07:13        CULTURES:   Clean Catch Clean Catch (Midstream)  05-22 @ 08:25   No growth  --  --      .Blood Blood-Peripheral  05-22 @ 02:24   No growth to date.  --  --        RADIOLOGY:    < from: CT Abdomen and Pelvis w/ IV Cont (05.23.22 @ 15:32) >    ACC: [id number] EXAM:  CT ABDOMEN AND PELVIS IC                          PROCEDURE DATE:  05/23/2022          INTERPRETATION:  CLINICAL INFORMATION: 71 years  Female with perforated   appendicitis.    COMPARISON: 5/21/2022    CONTRAST/COMPLICATIONS:  IV Contrast: Omnipaque 350  90 cc administered   10 cc discarded  Oral Contrast: NONE  Complications: None reported at time of study completion    PROCEDURE:  CT of the Abdomen and Pelvis was performed.  Sagittal and coronal reformats were performed.    FINDINGS:  LOWER CHEST: Developing small bilateral pleural effusions. Bilateral   groundglass opacities secondary to motion artifact, pulmonary edema or   pneumonia.    LIVER: Within normal limits.  BILE DUCTS: Stable common duct distention to 1.3cm.  GALLBLADDER: Cholecystectomy.  SPLEEN: Within normal limits.  PANCREAS: 1.8 x 1.1 cm pancreatic head cyst.  ADRENALS: Stable nodular thickening left adrenal gland. Normal right   adrenal gland.  KIDNEYS/URETERS: Punctate bilateral nonobstructingintrarenal calculi. No   hydronephrosis. Stable right extrarenal pelvis.    BLADDER: Within normal limits.  REPRODUCTIVE ORGANS: Prominent uterus and left ovary.    BOWEL: No bowel obstruction. Dilated fluid-filled appendix reidentified   measuring up to 8.4 mm. Irregular cecal wall thickening. Redemonstrated   collection adjacent to the cecal base (2:108 and 4:26) with air-fluid   level measuring 5.5 x 3.4 x 9.1 cm. Inflammatory stranding around the   collection. There may be an appendicolith within the dependent portion   (2:119). There is a more cephalad collection which may be contiguous with   the pericecal collection measuring 4.9 x 2.3 x 2.6 cm also containing a   droplet of air (2:80 and 4:32). Reidentified is irregular cecal wall   thickening. 4.6 cm duodenal diverticula.  PERITONEUM: Pericecal collections. Please see bowel findings. Focal   droplets of extraluminal air reidentified.  VESSELS: Atherosclerotic changes.  RETROPERITONEUM/LYMPH NODES: No lymphadenopathy.  ABDOMINAL WALL: Right lower quadrant subcutaneous edema.  BONES: Degenerative changes.    IMPRESSION:    Redemonstrated perforated acute appendicitis.    5.5 x 3.4 x 9.1 cm collection adjacent to the cecal base with air-fluid   level, likely abscess and possiblycontaining appendicolith. Adjacent   cephalad collection measuring 4.9 x 2.3 x 2.6 cm. With the benefit of IV   contrast on the current study, the collections have not changed   significantly in size. Dilated appendix reidentified.    Irregular wall thickening of the cecum reidentified. Cannot exclude   underlying neoplasm.    Right lower quadrant subcutaneous edema. Rule out cellulitis.    Developing small bilateral pleural effusions. Bilateral opacities   secondary to motion artifact, pneumoniaor pulmonary edema.    1.8 x 1.1 cm pancreatic head cyst. Recommend follow-up MRI to evaluate   for IPMN or other pathology.    Prominent uterus and left ovary. Recommend ultrasound.        --- End of Report ---            TESHA MORALES MD; Attending Radiologist  This document has been electronically signed. May 23 2022  4:27PM    < end of copied text >   71y Female is under our care for Acute Appendicitis with perforation with Intraabdominal abscesses.  Patient was seen laying comfortably in bed with no acute distress.  Patient reports RLQ abdominal tenderness upon palpation, remains afebrile, WBC count has normalized and is pending IR drainage tomorrow.     REVIEW OF SYSTEMS:  [  ] Not able to elicit  General: no fevers no malaise  Chest: no cough no sob  GI: no nvd, abd pain +  : no urinary sxs   Skin: no rashes  Musculoskeletal: no trauma no LBP  Neuro: no ha's no dizziness     MEDS:  piperacillin/tazobactam IVPB.. 3.375 Gram(s) IV Intermittent every 8 hours    ALLERGIES: Allergies    No Known Allergies    Intolerances        VITALS:  Vital Signs Last 24 Hrs  T(C): 37.5 (24 May 2022 05:25), Max: 37.5 (24 May 2022 05:25)  T(F): 99.5 (24 May 2022 05:25), Max: 99.5 (24 May 2022 05:25)  HR: 73 (24 May 2022 05:25) (63 - 76)  BP: 100/60 (24 May 2022 05:25) (100/60 - 122/60)  BP(mean): --  RR: 17 (24 May 2022 05:25) (17 - 18)  SpO2: 95% (24 May 2022 05:25) (95% - 99%)      PHYSICAL EXAM:  HEENT: n/a  Neck: supple no LN's   Respiratory: lungs clear no rales  Cardiovascular: S1 S2 reg no murmurs  Gastrointestinal: +BS with soft, nondistended abdomen; RLQ tenderness +  Extremities: no edema  Skin: no rashes  Ortho: n/a  Neuro: AAO x 4      LABS/DIAGNOSTIC TESTS:                        12.2   10.63 )-----------( 212      ( 24 May 2022 07:13 )             37.3     WBC Count: 10.63 K/uL (05-24 @ 07:13)  WBC Count: 11.42 K/uL (05-23 @ 07:21)  WBC Count: 10.31 K/uL (05-22 @ 08:15)  WBC Count: 13.16 K/uL (05-21 @ 16:29)    05-24    137  |  104  |  5<L>  ----------------------------<  138<H>  3.5   |  26  |  0.62    Ca    8.5      24 May 2022 07:13        CULTURES:   Clean Catch Clean Catch (Midstream)  05-22 @ 08:25   No growth  --  --      .Blood Blood-Peripheral  05-22 @ 02:24   No growth to date.  --  --        RADIOLOGY:    < from: CT Abdomen and Pelvis w/ IV Cont (05.23.22 @ 15:32) >    ACC: [id number] EXAM:  CT ABDOMEN AND PELVIS IC                          PROCEDURE DATE:  05/23/2022          INTERPRETATION:  CLINICAL INFORMATION: 71 years  Female with perforated   appendicitis.    COMPARISON: 5/21/2022    CONTRAST/COMPLICATIONS:  IV Contrast: Omnipaque 350  90 cc administered   10 cc discarded  Oral Contrast: NONE  Complications: None reported at time of study completion    PROCEDURE:  CT of the Abdomen and Pelvis was performed.  Sagittal and coronal reformats were performed.    FINDINGS:  LOWER CHEST: Developing small bilateral pleural effusions. Bilateral   groundglass opacities secondary to motion artifact, pulmonary edema or   pneumonia.    LIVER: Within normal limits.  BILE DUCTS: Stable common duct distention to 1.3cm.  GALLBLADDER: Cholecystectomy.  SPLEEN: Within normal limits.  PANCREAS: 1.8 x 1.1 cm pancreatic head cyst.  ADRENALS: Stable nodular thickening left adrenal gland. Normal right   adrenal gland.  KIDNEYS/URETERS: Punctate bilateral nonobstructingintrarenal calculi. No   hydronephrosis. Stable right extrarenal pelvis.    BLADDER: Within normal limits.  REPRODUCTIVE ORGANS: Prominent uterus and left ovary.    BOWEL: No bowel obstruction. Dilated fluid-filled appendix reidentified   measuring up to 8.4 mm. Irregular cecal wall thickening. Redemonstrated   collection adjacent to the cecal base (2:108 and 4:26) with air-fluid   level measuring 5.5 x 3.4 x 9.1 cm. Inflammatory stranding around the   collection. There may be an appendicolith within the dependent portion   (2:119). There is a more cephalad collection which may be contiguous with   the pericecal collection measuring 4.9 x 2.3 x 2.6 cm also containing a   droplet of air (2:80 and 4:32). Reidentified is irregular cecal wall   thickening. 4.6 cm duodenal diverticula.  PERITONEUM: Pericecal collections. Please see bowel findings. Focal   droplets of extraluminal air reidentified.  VESSELS: Atherosclerotic changes.  RETROPERITONEUM/LYMPH NODES: No lymphadenopathy.  ABDOMINAL WALL: Right lower quadrant subcutaneous edema.  BONES: Degenerative changes.    IMPRESSION:    Redemonstrated perforated acute appendicitis.    5.5 x 3.4 x 9.1 cm collection adjacent to the cecal base with air-fluid   level, likely abscess and possiblycontaining appendicolith. Adjacent   cephalad collection measuring 4.9 x 2.3 x 2.6 cm. With the benefit of IV   contrast on the current study, the collections have not changed   significantly in size. Dilated appendix reidentified.    Irregular wall thickening of the cecum reidentified. Cannot exclude   underlying neoplasm.    Right lower quadrant subcutaneous edema. Rule out cellulitis.    Developing small bilateral pleural effusions. Bilateral opacities   secondary to motion artifact, pneumoniaor pulmonary edema.    1.8 x 1.1 cm pancreatic head cyst. Recommend follow-up MRI to evaluate   for IPMN or other pathology.    Prominent uterus and left ovary. Recommend ultrasound.        --- End of Report ---            TESHA MORALES MD; Attending Radiologist  This document has been electronically signed. May 23 2022  4:27PM    < end of copied text >

## 2022-05-25 LAB
ANION GAP SERPL CALC-SCNC: 7 MMOL/L — SIGNIFICANT CHANGE UP (ref 5–17)
APTT BLD: 34.2 SEC — SIGNIFICANT CHANGE UP (ref 27.5–35.5)
BLD GP AB SCN SERPL QL: SIGNIFICANT CHANGE UP
BUN SERPL-MCNC: 6 MG/DL — LOW (ref 7–18)
CALCIUM SERPL-MCNC: 8.8 MG/DL — SIGNIFICANT CHANGE UP (ref 8.4–10.5)
CHLORIDE SERPL-SCNC: 103 MMOL/L — SIGNIFICANT CHANGE UP (ref 96–108)
CO2 SERPL-SCNC: 28 MMOL/L — SIGNIFICANT CHANGE UP (ref 22–31)
CREAT SERPL-MCNC: 0.73 MG/DL — SIGNIFICANT CHANGE UP (ref 0.5–1.3)
EGFR: 87 ML/MIN/1.73M2 — SIGNIFICANT CHANGE UP
GLUCOSE BLDC GLUCOMTR-MCNC: 110 MG/DL — HIGH (ref 70–99)
GLUCOSE BLDC GLUCOMTR-MCNC: 113 MG/DL — HIGH (ref 70–99)
GLUCOSE BLDC GLUCOMTR-MCNC: 123 MG/DL — HIGH (ref 70–99)
GLUCOSE BLDC GLUCOMTR-MCNC: 145 MG/DL — HIGH (ref 70–99)
GLUCOSE BLDC GLUCOMTR-MCNC: 148 MG/DL — HIGH (ref 70–99)
GLUCOSE SERPL-MCNC: 159 MG/DL — HIGH (ref 70–99)
HCT VFR BLD CALC: 36.6 % — SIGNIFICANT CHANGE UP (ref 34.5–45)
HGB BLD-MCNC: 12.2 G/DL — SIGNIFICANT CHANGE UP (ref 11.5–15.5)
INR BLD: 1.2 RATIO — HIGH (ref 0.88–1.16)
MCHC RBC-ENTMCNC: 28.7 PG — SIGNIFICANT CHANGE UP (ref 27–34)
MCHC RBC-ENTMCNC: 33.3 GM/DL — SIGNIFICANT CHANGE UP (ref 32–36)
MCV RBC AUTO: 86.1 FL — SIGNIFICANT CHANGE UP (ref 80–100)
NRBC # BLD: 0 /100 WBCS — SIGNIFICANT CHANGE UP (ref 0–0)
PLATELET # BLD AUTO: 247 K/UL — SIGNIFICANT CHANGE UP (ref 150–400)
POTASSIUM SERPL-MCNC: 3.5 MMOL/L — SIGNIFICANT CHANGE UP (ref 3.5–5.3)
POTASSIUM SERPL-SCNC: 3.5 MMOL/L — SIGNIFICANT CHANGE UP (ref 3.5–5.3)
PROTHROM AB SERPL-ACNC: 14.3 SEC — HIGH (ref 10.5–13.4)
RBC # BLD: 4.25 M/UL — SIGNIFICANT CHANGE UP (ref 3.8–5.2)
RBC # FLD: 14.3 % — SIGNIFICANT CHANGE UP (ref 10.3–14.5)
SODIUM SERPL-SCNC: 138 MMOL/L — SIGNIFICANT CHANGE UP (ref 135–145)
WBC # BLD: 11.93 K/UL — HIGH (ref 3.8–10.5)
WBC # FLD AUTO: 11.93 K/UL — HIGH (ref 3.8–10.5)

## 2022-05-25 PROCEDURE — 10160 PNXR ASPIR ABSC HMTMA BULLA: CPT

## 2022-05-25 PROCEDURE — 77012 CT SCAN FOR NEEDLE BIOPSY: CPT | Mod: 26

## 2022-05-25 PROCEDURE — 99232 SBSQ HOSP IP/OBS MODERATE 35: CPT

## 2022-05-25 RX ORDER — FENTANYL CITRATE 50 UG/ML
25 INJECTION INTRAVENOUS
Refills: 0 | Status: DISCONTINUED | OUTPATIENT
Start: 2022-05-25 | End: 2022-05-25

## 2022-05-25 RX ORDER — ACETAMINOPHEN 500 MG
1000 TABLET ORAL ONCE
Refills: 0 | Status: DISCONTINUED | OUTPATIENT
Start: 2022-05-25 | End: 2022-05-25

## 2022-05-25 RX ORDER — ACETAMINOPHEN 500 MG
650 TABLET ORAL EVERY 6 HOURS
Refills: 0 | Status: DISCONTINUED | OUTPATIENT
Start: 2022-05-25 | End: 2022-05-26

## 2022-05-25 RX ADMIN — Medication 3 MILLIGRAM(S): at 21:37

## 2022-05-25 RX ADMIN — SODIUM CHLORIDE 125 MILLILITER(S): 9 INJECTION, SOLUTION INTRAVENOUS at 00:48

## 2022-05-25 RX ADMIN — Medication 650 MILLIGRAM(S): at 19:28

## 2022-05-25 RX ADMIN — PIPERACILLIN AND TAZOBACTAM 25 GRAM(S): 4; .5 INJECTION, POWDER, LYOPHILIZED, FOR SOLUTION INTRAVENOUS at 14:46

## 2022-05-25 RX ADMIN — SODIUM CHLORIDE 125 MILLILITER(S): 9 INJECTION, SOLUTION INTRAVENOUS at 21:24

## 2022-05-25 RX ADMIN — MORPHINE SULFATE 2 MILLIGRAM(S): 50 CAPSULE, EXTENDED RELEASE ORAL at 20:56

## 2022-05-25 RX ADMIN — MORPHINE SULFATE 2 MILLIGRAM(S): 50 CAPSULE, EXTENDED RELEASE ORAL at 21:11

## 2022-05-25 RX ADMIN — MORPHINE SULFATE 2 MILLIGRAM(S): 50 CAPSULE, EXTENDED RELEASE ORAL at 01:03

## 2022-05-25 RX ADMIN — PIPERACILLIN AND TAZOBACTAM 25 GRAM(S): 4; .5 INJECTION, POWDER, LYOPHILIZED, FOR SOLUTION INTRAVENOUS at 21:24

## 2022-05-25 RX ADMIN — PIPERACILLIN AND TAZOBACTAM 25 GRAM(S): 4; .5 INJECTION, POWDER, LYOPHILIZED, FOR SOLUTION INTRAVENOUS at 05:12

## 2022-05-25 RX ADMIN — MORPHINE SULFATE 2 MILLIGRAM(S): 50 CAPSULE, EXTENDED RELEASE ORAL at 00:48

## 2022-05-25 RX ADMIN — Medication 650 MILLIGRAM(S): at 20:28

## 2022-05-25 NOTE — PROGRESS NOTE ADULT - SUBJECTIVE AND OBJECTIVE BOX
71y Female is under our care for     REVIEW OF SYSTEMS:  [  ] Not able to elicit      MEDS:  piperacillin/tazobactam IVPB.. 3.375 Gram(s) IV Intermittent every 8 hours    ALLERGIES: Allergies    No Known Allergies    Intolerances        VITALS:  Vital Signs Last 24 Hrs  T(C): 37.3 (25 May 2022 05:20), Max: 37.3 (25 May 2022 05:20)  T(F): 99.1 (25 May 2022 05:20), Max: 99.1 (25 May 2022 05:20)  HR: 69 (25 May 2022 05:20) (69 - 75)  BP: 129/74 (25 May 2022 05:20) (110/67 - 129/74)  BP(mean): --  RR: 17 (25 May 2022 05:20) (16 - 18)  SpO2: 94% (25 May 2022 05:20) (91% - 96%)      PHYSICAL EXAM:      LABS/DIAGNOSTIC TESTS:                        12.2   11.93 )-----------( 247      ( 25 May 2022 04:59 )             36.6     WBC Count: 11.93 K/uL (05-25 @ 04:59)  WBC Count: 10.63 K/uL (05-24 @ 07:13)  WBC Count: 11.42 K/uL (05-23 @ 07:21)  WBC Count: 10.31 K/uL (05-22 @ 08:15)  WBC Count: 13.16 K/uL (05-21 @ 16:29)    05-25    138  |  103  |  6<L>  ----------------------------<  159<H>  3.5   |  28  |  0.73    Ca    8.8      25 May 2022 04:59        CULTURES:   Clean Catch Clean Catch (Midstream)  05-22 @ 08:25   No growth  --  --      .Blood Blood-Peripheral  05-22 @ 02:24   No growth to date.  --  --        RADIOLOGY:  no new studies 71y Female is under our care for Acute Appendicitis with perforation with Intraabdominal abscesses.  Patient was seen laying comfortably in bed with no acute distress. Patient is s/p IR drainage today and reports slight RLQ tenderness.  She remains afebrile with marginally elevated WBC count.    REVIEW OF SYSTEMS:  [  ] Not able to elicit  General: no fevers no malaise  Chest: no cough no sob  GI: no nvd, abd pain +  : no urinary sxs   Skin: no rashes  Musculoskeletal: no trauma no LBP  Neuro: no ha's no dizziness     MEDS:  piperacillin/tazobactam IVPB.. 3.375 Gram(s) IV Intermittent every 8 hours    ALLERGIES: Allergies    No Known Allergies    Intolerances        VITALS:  Vital Signs Last 24 Hrs  T(C): 37.3 (25 May 2022 05:20), Max: 37.3 (25 May 2022 05:20)  T(F): 99.1 (25 May 2022 05:20), Max: 99.1 (25 May 2022 05:20)  HR: 69 (25 May 2022 05:20) (69 - 75)  BP: 129/74 (25 May 2022 05:20) (110/67 - 129/74)  BP(mean): --  RR: 17 (25 May 2022 05:20) (16 - 18)  SpO2: 94% (25 May 2022 05:20) (91% - 96%)      PHYSICAL EXAM:  HEENT: n/a  Neck: supple no LN's   Respiratory: lungs clear no rales  Cardiovascular: S1 S2 reg no murmurs  Gastrointestinal: +BS with soft, nondistended abdomen; RLQ tenderness +, RLQ drain in place  Extremities: no edema  Skin: no rashes  Ortho: n/a  Neuro: AAO x 4    LABS/DIAGNOSTIC TESTS:                        12.2   11.93 )-----------( 247      ( 25 May 2022 04:59 )             36.6     WBC Count: 11.93 K/uL (05-25 @ 04:59)  WBC Count: 10.63 K/uL (05-24 @ 07:13)  WBC Count: 11.42 K/uL (05-23 @ 07:21)  WBC Count: 10.31 K/uL (05-22 @ 08:15)  WBC Count: 13.16 K/uL (05-21 @ 16:29)    05-25    138  |  103  |  6<L>  ----------------------------<  159<H>  3.5   |  28  |  0.73    Ca    8.8      25 May 2022 04:59        CULTURES:   Clean Catch Clean Catch (Midstream)  05-22 @ 08:25   No growth  --  --      .Blood Blood-Peripheral  05-22 @ 02:24   No growth to date.  --  --        RADIOLOGY:  no new studies

## 2022-05-25 NOTE — PROCEDURE NOTE - PROCEDURE FINDINGS AND DETAILS
Preliminary CT demonstrated decrease in size of right lower quadrant abdominal abscess, which is now mostly air. After discussing these findings with Dr. Vee, decision was made to proceed with drainage. 10.2 Fr drainage catheter placed. 10 cc of cloudy yellow fluid / air mixture was aspirated. Sample of fluid sent for culture. Catheter connected to gravity drainage.

## 2022-05-25 NOTE — PROGRESS NOTE ADULT - ASSESSMENT
71 yoF with perforated appendicitis with abscess  borderline febrile, on zosyn   vss; labs wnl, wbc 11.93  patient seen and examined at bedside with Dr. Vee     - NPO, IVF   - IR for drainage today   - zosyn, f/u ID recs   - serial abdominal exams   - dvt/gi ppx, pain control, oob/ambulate  - discussed and agreed with Dr. Vee

## 2022-05-25 NOTE — PROGRESS NOTE ADULT - SUBJECTIVE AND OBJECTIVE BOX
INTERVAL HPI/OVERNIGHT EVENTS:  Patient seen and examined at bedside   Pt resting comfortably. No acute complaints.   Tolerating regular diet yesterday, patient is currently NPO.   States that she still has abdominal pain in RLQ, that has improved since yesterday.   Patient denies chest pain, shortness of breath, fever, chills.       MEDICATIONS  (STANDING):  dextrose 5% + sodium chloride 0.45%. 1000 milliLiter(s) (125 mL/Hr) IV Continuous <Continuous>  dextrose 5%. 1000 milliLiter(s) (50 mL/Hr) IV Continuous <Continuous>  dextrose 5%. 1000 milliLiter(s) (100 mL/Hr) IV Continuous <Continuous>  dextrose 50% Injectable 25 Gram(s) IV Push once  dextrose 50% Injectable 12.5 Gram(s) IV Push once  dextrose 50% Injectable 25 Gram(s) IV Push once  diatrizoate meglumine/diatrizoate sodium. 30 milliLiter(s) Oral once  glucagon  Injectable 1 milliGRAM(s) IntraMuscular once  insulin lispro (ADMELOG) corrective regimen sliding scale   SubCutaneous three times a day before meals  melatonin 3 milliGRAM(s) Oral at bedtime  piperacillin/tazobactam IVPB.. 3.375 Gram(s) IV Intermittent every 8 hours    MEDICATIONS  (PRN):  dextrose Oral Gel 15 Gram(s) Oral once PRN Blood Glucose LESS THAN 70 milliGRAM(s)/deciliter  morphine  - Injectable 2 milliGRAM(s) IV Push every 4 hours PRN Moderate Pain (4 - 6)  ondansetron Injectable 4 milliGRAM(s) IV Push every 6 hours PRN Nausea and/or Vomiting      Vital Signs Last 24 Hrs  T(C): 37.3 (25 May 2022 05:20), Max: 37.3 (25 May 2022 05:20)  T(F): 99.1 (25 May 2022 05:20), Max: 99.1 (25 May 2022 05:20)  HR: 69 (25 May 2022 05:20) (69 - 75)  BP: 129/74 (25 May 2022 05:20) (110/67 - 129/74)  BP(mean): --  RR: 17 (25 May 2022 05:20) (16 - 18)  SpO2: 94% (25 May 2022 05:20) (91% - 96%)    Physical:  General: A&Ox3. NAD.  Respiratory: non labored  Skin: warm  Abdomen: Soft, nondistended, mild tenderness by RLQ, no rebound tenderness, no guarding, no palpable masses,   Extremities: non edematous, no calf pain bilaterally      I&O's Detail    24 May 2022 07:01  -  25 May 2022 07:00  --------------------------------------------------------  IN:    dextrose 5% + sodium chloride 0.45%: 1500 mL  Total IN: 1500 mL    OUT:  Total OUT: 0 mL    Total NET: 1500 mL          LABS:                        12.2   11.93 )-----------( 247      ( 25 May 2022 04:59 )             36.6             05-25    138  |  103  |  6<L>  ----------------------------<  159<H>  3.5   |  28  |  0.73    Ca    8.8      25 May 2022 04:59      PT/INR - ( 25 May 2022 04:59 )   PT: 14.3 sec;   INR: 1.20 ratio         PTT - ( 25 May 2022 04:59 )  PTT:34.2 sec     INTERVAL HPI/OVERNIGHT EVENTS:  Patient seen and examined at bedside   Pt resting comfortably. No acute complaints.   Tolerating regular diet yesterday, patient is currently NPO.   States that she still has abdominal pain in RLQ, that is about the same as yesterday.   Patient denies chest pain, shortness of breath, fever, chills.       MEDICATIONS  (STANDING):  dextrose 5% + sodium chloride 0.45%. 1000 milliLiter(s) (125 mL/Hr) IV Continuous <Continuous>  dextrose 5%. 1000 milliLiter(s) (50 mL/Hr) IV Continuous <Continuous>  dextrose 5%. 1000 milliLiter(s) (100 mL/Hr) IV Continuous <Continuous>  dextrose 50% Injectable 25 Gram(s) IV Push once  dextrose 50% Injectable 12.5 Gram(s) IV Push once  dextrose 50% Injectable 25 Gram(s) IV Push once  diatrizoate meglumine/diatrizoate sodium. 30 milliLiter(s) Oral once  glucagon  Injectable 1 milliGRAM(s) IntraMuscular once  insulin lispro (ADMELOG) corrective regimen sliding scale   SubCutaneous three times a day before meals  melatonin 3 milliGRAM(s) Oral at bedtime  piperacillin/tazobactam IVPB.. 3.375 Gram(s) IV Intermittent every 8 hours    MEDICATIONS  (PRN):  dextrose Oral Gel 15 Gram(s) Oral once PRN Blood Glucose LESS THAN 70 milliGRAM(s)/deciliter  morphine  - Injectable 2 milliGRAM(s) IV Push every 4 hours PRN Moderate Pain (4 - 6)  ondansetron Injectable 4 milliGRAM(s) IV Push every 6 hours PRN Nausea and/or Vomiting      Vital Signs Last 24 Hrs  T(C): 37.3 (25 May 2022 05:20), Max: 37.3 (25 May 2022 05:20)  T(F): 99.1 (25 May 2022 05:20), Max: 99.1 (25 May 2022 05:20)  HR: 69 (25 May 2022 05:20) (69 - 75)  BP: 129/74 (25 May 2022 05:20) (110/67 - 129/74)  BP(mean): --  RR: 17 (25 May 2022 05:20) (16 - 18)  SpO2: 94% (25 May 2022 05:20) (91% - 96%)    Physical:  General: A&Ox3. NAD.  Respiratory: non labored  Skin: warm  Abdomen: Soft, nondistended, mild tenderness by RLQ, no rebound tenderness, no guarding, no palpable masses,   Extremities: non edematous, no calf pain bilaterally      I&O's Detail    24 May 2022 07:01  -  25 May 2022 07:00  --------------------------------------------------------  IN:    dextrose 5% + sodium chloride 0.45%: 1500 mL  Total IN: 1500 mL    OUT:  Total OUT: 0 mL    Total NET: 1500 mL          LABS:                        12.2   11.93 )-----------( 247      ( 25 May 2022 04:59 )             36.6             05-25    138  |  103  |  6<L>  ----------------------------<  159<H>  3.5   |  28  |  0.73    Ca    8.8      25 May 2022 04:59      PT/INR - ( 25 May 2022 04:59 )   PT: 14.3 sec;   INR: 1.20 ratio         PTT - ( 25 May 2022 04:59 )  PTT:34.2 sec

## 2022-05-25 NOTE — PROGRESS NOTE ADULT - PROBLEM SELECTOR PLAN 1
S/p IR drainage, f/u cultures  Resume regular diet  Ok to d/c home tomorrow on oral antibiotics if remains afebrile, stable

## 2022-05-25 NOTE — PROCEDURE NOTE - PLAN
- PACU x 2 hours.  - F/u culture results.   - Monitor drainage catheter output.  - Flush catheter with 5 cc NS q12 hours.  - Call IR with questions/concerns regarding procedure.  - Global care as per primary team.    Official report to follow.

## 2022-05-25 NOTE — CHART NOTE - NSCHARTNOTEFT_GEN_A_CORE
Reassessment:     Patient is a 71y old  Female who presents with a chief complaint of perforated appendicitis (25 May 2022 11:37)      Factors impacting intake: [ ] none [ ] nausea  [ ] vomiting [ ] diarrhea [ ] constipation  [ ]chewing problems [ ] swallowing issues  [X ] other: abdominal pain, perforated appendix w/abscess, diabetes, HTN      Diet Prescription: Diet, NPO after Midnight:      NPO Start Date: 24-May-2022,   NPO Start Time: 23:59  Except Medications (22 @ 11:29)  Diet, Regular:   Consistent Carbohydrate {No Snacks} (22 @ 17:41)    Intake: Patient visited, "resting", s/p IR & drainage of abscess today in am, presently NPO w/IV fluids, possible PICC line insertion for IV abx noted, rec. advance diet with nutrition supplement as medically feasible or may consider alternate nutrition support as medically feasible. RD available.       Daily Weight in k (25 May 2022 05:20)    % Weight Change: wt. fluctuation noted     Pertinent Medications: MEDICATIONS  (STANDING):  dextrose 5% + sodium chloride 0.45%. 1000 milliLiter(s) (125 mL/Hr) IV Continuous <Continuous>  dextrose 5%. 1000 milliLiter(s) (50 mL/Hr) IV Continuous <Continuous>  dextrose 5%. 1000 milliLiter(s) (100 mL/Hr) IV Continuous <Continuous>  dextrose 50% Injectable 25 Gram(s) IV Push once  dextrose 50% Injectable 12.5 Gram(s) IV Push once  dextrose 50% Injectable 25 Gram(s) IV Push once  diatrizoate meglumine/diatrizoate sodium. 30 milliLiter(s) Oral once  glucagon  Injectable 1 milliGRAM(s) IntraMuscular once  insulin lispro (ADMELOG) corrective regimen sliding scale   SubCutaneous three times a day before meals  melatonin 3 milliGRAM(s) Oral at bedtime  piperacillin/tazobactam IVPB.. 3.375 Gram(s) IV Intermittent every 8 hours    MEDICATIONS  (PRN):  dextrose Oral Gel 15 Gram(s) Oral once PRN Blood Glucose LESS THAN 70 milliGRAM(s)/deciliter  morphine  - Injectable 2 milliGRAM(s) IV Push every 4 hours PRN Moderate Pain (4 - 6)  ondansetron Injectable 4 milliGRAM(s) IV Push every 6 hours PRN Nausea and/or Vomiting    Pertinent Labs:  Na138 mmol/L Glu 159 mg/dL<H> K+ 3.5 mmol/L Cr  0.73 mg/dL BUN 6 mg/dL<L>  Alb 2.7 g/dL<L>     CAPILLARY BLOOD GLUCOSE      POCT Blood Glucose.: 113 mg/dL (25 May 2022 11:42)  POCT Blood Glucose.: 145 mg/dL (25 May 2022 07:38)  POCT Blood Glucose.: 148 mg/dL (25 May 2022 06:08)  POCT Blood Glucose.: 123 mg/dL (24 May 2022 21:03)  POCT Blood Glucose.: 181 mg/dL (24 May 2022 16:21)    Skin: surgical incision w/wound care noted     Estimated Needs:   [X ] no change since previous assessment  [ ] recalculated:     Previous Nutrition Diagnosis:   [ ] Inadequate Energy Intake [X ]Inadequate Oral Intake [ ] Excessive Energy Intake   [ ] Underweight [ ] Increased Nutrient Needs [ ] Overweight/Obesity   [ ] Altered GI Function [ ] Unintended Weight Loss [ ] Food & Nutrition Related Knowledge Deficit [ ] Malnutrition     Nutrition Diagnosis is [ X] ongoing  [ ] resolved [ ] not applicable     Interventions: To meet nutrition needs     Recommend  [ ] Change Diet To:  [ ] Nutrition Supplement  [ ] Nutrition Support  [ X] Other: Nursing to continue meal set up and encouragement, aspiration precaution     Monitoring and Evaluation:   [X ] PO intake [ x ] Tolerance to diet prescription [ x ] weights [ x ] labs[ x ] follow up per protocol  [ ] other:

## 2022-05-25 NOTE — PRE PROCEDURE NOTE - PRE PROCEDURE EVALUATION
Interventional Radiology Pre-Procedure Note    Diagnosis/Indication: Patient is a 71y old Female with perforated appendicitis, complicated by abscess formation. Drainage of periappendiceal abscess was requested.     PAST MEDICAL & SURGICAL HISTORY:  Essential hypertension  Diabetes  Hyperlipidemia  S/P cholecystectomy      Allergies: No Known Allergies      LABS:  CBC Full  -  ( 25 May 2022 04:59 )  WBC Count : 11.93 K/uL  RBC Count : 4.25 M/uL  Hemoglobin : 12.2 g/dL  Hematocrit : 36.6 %  Platelet Count - Automated : 247 K/uL  Mean Cell Volume : 86.1 fl  Mean Cell Hemoglobin : 28.7 pg  Mean Cell Hemoglobin Concentration : 33.3 gm/dL    05-25    138  |  103  |  6<L>  ----------------------------<  159<H>  3.5   |  28  |  0.73    Ca    8.8      25 May 2022 04:59    PT/INR - ( 25 May 2022 04:59 )   PT: 14.3 sec;   INR: 1.20 ratio       PTT - ( 25 May 2022 04:59 )  PTT:34.2 sec    Procedure/ risks/ benefits/ alternatives were discussed with the patient, who verbalizes understanding, and witnessed informed consent was obtained.

## 2022-05-25 NOTE — PROGRESS NOTE ADULT - SUBJECTIVE AND OBJECTIVE BOX
pt seen and examined. S/p IR drainage earlier today. Reviewed IR attending note, aspirated 10 cc purulent fluid. Pt reports no increased pain.

## 2022-05-25 NOTE — PROGRESS NOTE ADULT - ASSESSMENT
Acute Appendicitis with perforation  Intraabdominal abscess  Leukocytosis - normalized    Plan - Cont Zosyn 3.375 gms iv q8hrs  IR drainage today  will likely need a Midline or PICC line to get IV antibiotics at home for approx 3 weeks. Acute Appendicitis with perforation  Intraabdominal abscess  Leukocytosis - normalized    Plan - Cont Zosyn 3.375 gms iv q8hrs  IR drainage today, pending culture results  will likely need a Midline or PICC line to get IV antibiotics at home for approx 3 weeks.

## 2022-05-26 ENCOUNTER — TRANSCRIPTION ENCOUNTER (OUTPATIENT)
Age: 72
End: 2022-05-26

## 2022-05-26 VITALS
TEMPERATURE: 98 F | OXYGEN SATURATION: 98 % | RESPIRATION RATE: 18 BRPM | DIASTOLIC BLOOD PRESSURE: 68 MMHG | SYSTOLIC BLOOD PRESSURE: 116 MMHG | HEART RATE: 71 BPM

## 2022-05-26 LAB
BASOPHILS # BLD AUTO: 0.04 K/UL — SIGNIFICANT CHANGE UP (ref 0–0.2)
BASOPHILS NFR BLD AUTO: 0.4 % — SIGNIFICANT CHANGE UP (ref 0–2)
EOSINOPHIL # BLD AUTO: 0.25 K/UL — SIGNIFICANT CHANGE UP (ref 0–0.5)
EOSINOPHIL NFR BLD AUTO: 2.2 % — SIGNIFICANT CHANGE UP (ref 0–6)
GLUCOSE BLDC GLUCOMTR-MCNC: 117 MG/DL — HIGH (ref 70–99)
GLUCOSE BLDC GLUCOMTR-MCNC: 146 MG/DL — HIGH (ref 70–99)
HCT VFR BLD CALC: 37.7 % — SIGNIFICANT CHANGE UP (ref 34.5–45)
HGB BLD-MCNC: 12.5 G/DL — SIGNIFICANT CHANGE UP (ref 11.5–15.5)
IMM GRANULOCYTES NFR BLD AUTO: 1.2 % — SIGNIFICANT CHANGE UP (ref 0–1.5)
LYMPHOCYTES # BLD AUTO: 1.66 K/UL — SIGNIFICANT CHANGE UP (ref 1–3.3)
LYMPHOCYTES # BLD AUTO: 14.8 % — SIGNIFICANT CHANGE UP (ref 13–44)
MCHC RBC-ENTMCNC: 28.9 PG — SIGNIFICANT CHANGE UP (ref 27–34)
MCHC RBC-ENTMCNC: 33.2 GM/DL — SIGNIFICANT CHANGE UP (ref 32–36)
MCV RBC AUTO: 87.3 FL — SIGNIFICANT CHANGE UP (ref 80–100)
MONOCYTES # BLD AUTO: 0.81 K/UL — SIGNIFICANT CHANGE UP (ref 0–0.9)
MONOCYTES NFR BLD AUTO: 7.2 % — SIGNIFICANT CHANGE UP (ref 2–14)
NEUTROPHILS # BLD AUTO: 8.34 K/UL — HIGH (ref 1.8–7.4)
NEUTROPHILS NFR BLD AUTO: 74.2 % — SIGNIFICANT CHANGE UP (ref 43–77)
NRBC # BLD: 0 /100 WBCS — SIGNIFICANT CHANGE UP (ref 0–0)
PLATELET # BLD AUTO: 254 K/UL — SIGNIFICANT CHANGE UP (ref 150–400)
RBC # BLD: 4.32 M/UL — SIGNIFICANT CHANGE UP (ref 3.8–5.2)
RBC # FLD: 14.2 % — SIGNIFICANT CHANGE UP (ref 10.3–14.5)
WBC # BLD: 11.24 K/UL — HIGH (ref 3.8–10.5)
WBC # FLD AUTO: 11.24 K/UL — HIGH (ref 3.8–10.5)

## 2022-05-26 PROCEDURE — 86900 BLOOD TYPING SEROLOGIC ABO: CPT

## 2022-05-26 PROCEDURE — 85730 THROMBOPLASTIN TIME PARTIAL: CPT

## 2022-05-26 PROCEDURE — 99285 EMERGENCY DEPT VISIT HI MDM: CPT | Mod: 25

## 2022-05-26 PROCEDURE — 86901 BLOOD TYPING SEROLOGIC RH(D): CPT

## 2022-05-26 PROCEDURE — 74176 CT ABD & PELVIS W/O CONTRAST: CPT | Mod: MA

## 2022-05-26 PROCEDURE — 86850 RBC ANTIBODY SCREEN: CPT

## 2022-05-26 PROCEDURE — 85025 COMPLETE CBC W/AUTO DIFF WBC: CPT

## 2022-05-26 PROCEDURE — 87521 HEPATITIS C PROBE&RVRS TRNSC: CPT

## 2022-05-26 PROCEDURE — 80053 COMPREHEN METABOLIC PANEL: CPT

## 2022-05-26 PROCEDURE — 74177 CT ABD & PELVIS W/CONTRAST: CPT

## 2022-05-26 PROCEDURE — 85027 COMPLETE CBC AUTOMATED: CPT

## 2022-05-26 PROCEDURE — 80048 BASIC METABOLIC PNL TOTAL CA: CPT

## 2022-05-26 PROCEDURE — 87040 BLOOD CULTURE FOR BACTERIA: CPT

## 2022-05-26 PROCEDURE — 87077 CULTURE AEROBIC IDENTIFY: CPT

## 2022-05-26 PROCEDURE — 87086 URINE CULTURE/COLONY COUNT: CPT

## 2022-05-26 PROCEDURE — 36415 COLL VENOUS BLD VENIPUNCTURE: CPT

## 2022-05-26 PROCEDURE — 81001 URINALYSIS AUTO W/SCOPE: CPT

## 2022-05-26 PROCEDURE — 87186 SC STD MICRODIL/AGAR DIL: CPT

## 2022-05-26 PROCEDURE — 10160 PNXR ASPIR ABSC HMTMA BULLA: CPT

## 2022-05-26 PROCEDURE — 83036 HEMOGLOBIN GLYCOSYLATED A1C: CPT

## 2022-05-26 PROCEDURE — 87635 SARS-COV-2 COVID-19 AMP PRB: CPT

## 2022-05-26 PROCEDURE — 85610 PROTHROMBIN TIME: CPT

## 2022-05-26 PROCEDURE — 87205 SMEAR GRAM STAIN: CPT

## 2022-05-26 PROCEDURE — 83605 ASSAY OF LACTIC ACID: CPT

## 2022-05-26 PROCEDURE — 83690 ASSAY OF LIPASE: CPT

## 2022-05-26 PROCEDURE — 82962 GLUCOSE BLOOD TEST: CPT

## 2022-05-26 PROCEDURE — 77012 CT SCAN FOR NEEDLE BIOPSY: CPT

## 2022-05-26 PROCEDURE — 99238 HOSP IP/OBS DSCHRG MGMT 30/<: CPT

## 2022-05-26 PROCEDURE — 87070 CULTURE OTHR SPECIMN AEROBIC: CPT

## 2022-05-26 PROCEDURE — 86803 HEPATITIS C AB TEST: CPT

## 2022-05-26 RX ORDER — HEPARIN SODIUM 5000 [USP'U]/ML
5000 INJECTION INTRAVENOUS; SUBCUTANEOUS EVERY 8 HOURS
Refills: 0 | Status: DISCONTINUED | OUTPATIENT
Start: 2022-05-26 | End: 2022-05-26

## 2022-05-26 RX ORDER — METRONIDAZOLE 500 MG
1 TABLET ORAL
Qty: 42 | Refills: 0
Start: 2022-05-26 | End: 2022-06-08

## 2022-05-26 RX ORDER — KETOROLAC TROMETHAMINE 30 MG/ML
15 SYRINGE (ML) INJECTION EVERY 6 HOURS
Refills: 0 | Status: COMPLETED | OUTPATIENT
Start: 2022-05-26 | End: 2022-05-27

## 2022-05-26 RX ORDER — CEFPODOXIME PROXETIL 100 MG
2 TABLET ORAL
Qty: 56 | Refills: 0
Start: 2022-05-26 | End: 2022-06-08

## 2022-05-26 RX ORDER — ACETAMINOPHEN 500 MG
2 TABLET ORAL
Qty: 0 | Refills: 0 | DISCHARGE
Start: 2022-05-26

## 2022-05-26 RX ADMIN — SODIUM CHLORIDE 125 MILLILITER(S): 9 INJECTION, SOLUTION INTRAVENOUS at 05:23

## 2022-05-26 RX ADMIN — HEPARIN SODIUM 5000 UNIT(S): 5000 INJECTION INTRAVENOUS; SUBCUTANEOUS at 14:38

## 2022-05-26 RX ADMIN — MORPHINE SULFATE 2 MILLIGRAM(S): 50 CAPSULE, EXTENDED RELEASE ORAL at 06:29

## 2022-05-26 RX ADMIN — PIPERACILLIN AND TAZOBACTAM 25 GRAM(S): 4; .5 INJECTION, POWDER, LYOPHILIZED, FOR SOLUTION INTRAVENOUS at 05:16

## 2022-05-26 RX ADMIN — MORPHINE SULFATE 2 MILLIGRAM(S): 50 CAPSULE, EXTENDED RELEASE ORAL at 06:44

## 2022-05-26 RX ADMIN — Medication 650 MILLIGRAM(S): at 14:36

## 2022-05-26 RX ADMIN — PIPERACILLIN AND TAZOBACTAM 25 GRAM(S): 4; .5 INJECTION, POWDER, LYOPHILIZED, FOR SOLUTION INTRAVENOUS at 14:36

## 2022-05-26 RX ADMIN — Medication 650 MILLIGRAM(S): at 15:30

## 2022-05-26 NOTE — DISCHARGE NOTE PROVIDER - NSDCFUSCHEDAPPT_GEN_ALL_CORE_FT
Mer Vee Physician [facility]  [facility] [address] [facility]  Scheduled Appointment: 06/01/2022

## 2022-05-26 NOTE — PROGRESS NOTE ADULT - REASON FOR ADMISSION
perforated appendicitis

## 2022-05-26 NOTE — DISCHARGE NOTE NURSING/CASE MANAGEMENT/SOCIAL WORK - NSDCFUADDAPPT_GEN_ALL_CORE_FT
Follow up with Dr. Vee within 5-7 days  Office: [address] [facility] Phone: [phone]  Call to schedule an appointment    Follow up with Interventional radiology for drain removal after following with surgeon, will be referred after first appointment.     Follow up with your primary care physician for continued management, resume all home medications for blood pressure and diabetes, medications to be readjusted as per PCP. Also get a referral for gastroenterologist for routine colonoscopy.

## 2022-05-26 NOTE — PROGRESS NOTE ADULT - ASSESSMENT
Acute Appendicitis with perforation  Intraabdominal abscess  Leukocytosis - normalized    Plan - Cont Zosyn 3.375 gms iv q8hrs  S/p IR drainage yesterday, pending culture results   Acute Appendicitis with perforation  Intraabdominal abscess  Leukocytosis - normalized    Plan - Cont Zosyn 3.375 gms iv q8hrs  S/p IR drainage yesterday  When medically stable for discharge, can go on Vantin 400mg po bid and Flagyl 500mg po tid for 2 more weeks   Acute Appendicitis with perforation  Intraabdominal abscess  Leukocytosis - normalized    Plan - Cont Zosyn 3.375 gms iv q8hrs  S/p IR drainage yesterday  When medically stable for discharge, can go on Vantin 400mg po bid and Flagyl 500mg po tid for 2 more weeks  will repeat CT abdomen in 2 weeks to assure resolution of her abscess.

## 2022-05-26 NOTE — DISCHARGE NOTE NURSING/CASE MANAGEMENT/SOCIAL WORK - PATIENT PORTAL LINK FT
You can access the FollowMyHealth Patient Portal offered by [facility] by registering at the following website: [email or web address]. By joining [facility]’s FollowMyHealth portal, you will also be able to view your health information using other applications (apps) compatible with our system.

## 2022-05-26 NOTE — DISCHARGE NOTE PROVIDER - HOSPITAL COURSE
71 year old female with PMH of DM, HTN and PSH of cholecystectomy presented to ED with RLQ abdominal pain. Per report, pt was once treated medically for acute appendicitis in 2018, no interval appendectomy. Work up revealed an acute appendicitis with fluid collection measuring 5.4 x 2.6 x 8.2 cm. Wall thickening of the ascending colon above the level of the ileocecal valve, likely due to inflammation, however neoplasm cannot be ruled out.     Pt was admitted, started on broad coverage antibiotic and ID consulted. IR consulted for drainage, drain placed on 5/25/22 with 10cc of purulent aspirate, sent for culture. Pt remained hemodynamically stable. Pt deemed safe for discharge home with VNS and oral antibiotics to complete.      71 year old female with PMH of DM, HTN and PSH of cholecystectomy presented to ED with RLQ abdominal pain. Per report, pt was once treated medically for acute appendicitis in 2018, no interval appendectomy. Work up revealed an acute appendicitis with fluid collection measuring 5.4 x 2.6 x 8.2 cm. Wall thickening of the ascending colon above the level of the ileocecal valve, likely due to inflammation, however neoplasm cannot be ruled out.   Based on above CT and CT from 2018 which stated concern for neoplasm, obtained colonoscopy report from 7/3/2021, performed by Dr. Mando Hull which demonstrated no evidence of cecal mass, and cecal biopsy nml.     Pt was admitted, started on broad coverage antibiotic and ID consulted. IR consulted for drainage, drain placed on 5/25/22 with 10cc of purulent aspirate, sent for culture. Pt remained hemodynamically stable. Pt deemed safe for discharge home with VNS and oral antibiotics to complete.

## 2022-05-26 NOTE — DISCHARGE NOTE PROVIDER - NSDCMRMEDTOKEN_GEN_ALL_CORE_FT
acetaminophen 325 mg oral tablet: 2 tab(s) orally every 6 hours, As needed, Mild Pain (1 - 3), Moderate Pain (4 - 6), Severe Pain (7 - 10)  baclofen 10 mg oral tablet: 1 tab(s) orally 2 times a day, As needed, back pain or spasm  brimonidine 0.2% ophthalmic solution: 1 drop(s) to each affected eye once a day  cefpodoxime 200 mg oral tablet: 2 tab(s) orally 2 times a day   fenofibrate 48 mg oral tablet: 1 tab(s) orally once a day (at bedtime)  latanoprost 0.005% ophthalmic solution: 1 drop(s) to each affected eye once a day (at bedtime)  losartan 50 mg oral tablet: 1 tab(s) orally once a day   metoprolol succinate 50 mg oral tablet, extended release: 1 tab(s) orally once a day  metroNIDAZOLE 500 mg oral tablet: 1 tab(s) orally 3 times a day  simvastatin 10 mg oral tablet: 1 tab(s) orally once a day (at bedtime)

## 2022-05-26 NOTE — DISCHARGE NOTE NURSING/CASE MANAGEMENT/SOCIAL WORK - NSDCPEFALRISK_GEN_ALL_CORE
For information on Fall & Injury Prevention, visit: [email or web address] [place]  [email or web address] [place]  https://www.cdc.gov/steadi/patient.html

## 2022-05-26 NOTE — PROGRESS NOTE ADULT - ASSESSMENT
71 yoF s/p IR drainage RLQ abscess 2/2 perforated appendicitis    cx sent, drain output not recorded, seropurulent on flush  afeb, vss; mild leuko 11.2 downtrending    - reg diet  - continue zosyn, oral abx on discharge, vantin/flagyl 14d  - dvt/gi ppx, pain control  - will need VNS   - dispo planning

## 2022-05-26 NOTE — DISCHARGE NOTE PROVIDER - NSDCCPTREATMENT_GEN_ALL_CORE_FT
PRINCIPAL PROCEDURE  Procedure: CT guided percutaneous drainage of abscess  Findings and Treatment: - per interventional radiology  - drain care as instructed, empty and record daily  - keep drain site clean and dry  - if increasing abdominal pain, fever/chills, nausea and vomiting, seek medical care immediately  - pain control with ibuprofen/acetaminophen alternating  - take antibiotics as instrcuted, please complete the course

## 2022-05-26 NOTE — DISCHARGE NOTE PROVIDER - CARE PROVIDER_API CALL
Mer Vee)  Surgery  [id number] [place] [address]  Phone: [phone]  Fax: [phone]  Follow Up Time: 1 week

## 2022-05-26 NOTE — PROGRESS NOTE ADULT - PROVIDER SPECIALTY LIST ADULT
Infectious Disease
Infectious Disease
Surgery
Infectious Disease
Surgery

## 2022-05-26 NOTE — DISCHARGE NOTE PROVIDER - ATTENDING DISCHARGE PHYSICAL EXAMINATION:
Pt seen and examined. Afebrile vss, abd exam unchanged, mild expected RLQ tenderness, drain in place.  D/c home with drain, on po abx per ID recs, f/u one week in clinic

## 2022-05-26 NOTE — PROGRESS NOTE ADULT - SUBJECTIVE AND OBJECTIVE BOX
71y Female is under our care for     REVIEW OF SYSTEMS:  [  ] Not able to elicit      MEDS:  piperacillin/tazobactam IVPB.. 3.375 Gram(s) IV Intermittent every 8 hours    ALLERGIES: Allergies    No Known Allergies    Intolerances        VITALS:  Vital Signs Last 24 Hrs  T(C): 36.4 (26 May 2022 05:15), Max: 37.3 (25 May 2022 20:30)  T(F): 97.5 (26 May 2022 05:15), Max: 99.1 (25 May 2022 20:30)  HR: 66 (26 May 2022 05:15) (62 - 74)  BP: 148/61 (26 May 2022 05:15) (120/67 - 164/90)  BP(mean): 79 (25 May 2022 20:30) (79 - 109)  RR: 17 (26 May 2022 05:15) (13 - 20)  SpO2: 98% (26 May 2022 05:15) (95% - 100%)      PHYSICAL EXAM:      LABS/DIAGNOSTIC TESTS:                        12.5   11.24 )-----------( 254      ( 26 May 2022 06:41 )             37.7     WBC Count: 11.24 K/uL (05-26 @ 06:41)  WBC Count: 11.93 K/uL (05-25 @ 04:59)  WBC Count: 10.63 K/uL (05-24 @ 07:13)  WBC Count: 11.42 K/uL (05-23 @ 07:21)  WBC Count: 10.31 K/uL (05-22 @ 08:15)    05-25    138  |  103  |  6<L>  ----------------------------<  159<H>  3.5   |  28  |  0.73    Ca    8.8      25 May 2022 04:59        CULTURES:   Clean Catch Clean Catch (Midstream)  05-22 @ 08:25   No growth  --  --      .Blood Blood-Peripheral  05-22 @ 02:24   No growth to date.  --  --        RADIOLOGY:  no new studies 71y Female is under our care for Acute Appendicitis with perforation with Intraabdominal abscesses.  Patient was seen sitting comfortably in the chair with no acute distress. Patient is s/p IR drainage yesterday and reports improvement in abdominal pain.  Patient remains afebrile and is WBC count is downtrending.    REVIEW OF SYSTEMS:  [  ] Not able to elicit  General: no fevers no malaise  Chest: no cough no sob  GI: no nvd  : no urinary sxs   Skin: no rashes  Musculoskeletal: no trauma no LBP  Neuro: no ha's no dizziness     MEDS:  piperacillin/tazobactam IVPB.. 3.375 Gram(s) IV Intermittent every 8 hours    ALLERGIES: Allergies    No Known Allergies    Intolerances        VITALS:  Vital Signs Last 24 Hrs  T(C): 36.4 (26 May 2022 05:15), Max: 37.3 (25 May 2022 20:30)  T(F): 97.5 (26 May 2022 05:15), Max: 99.1 (25 May 2022 20:30)  HR: 66 (26 May 2022 05:15) (62 - 74)  BP: 148/61 (26 May 2022 05:15) (120/67 - 164/90)  BP(mean): 79 (25 May 2022 20:30) (79 - 109)  RR: 17 (26 May 2022 05:15) (13 - 20)  SpO2: 98% (26 May 2022 05:15) (95% - 100%)      PHYSICAL EXAM:  HEENT: n/a  Neck: supple no LN's   Respiratory: lungs clear no rales  Cardiovascular: S1 S2 reg no murmurs  Gastrointestinal: +BS with soft, nondistended abdomen; RLQ slight tenderness upon palpation + , RLQ drain in place  Extremities: no edema  Skin: no rashes  Ortho: n/a  Neuro: AAO x 4      LABS/DIAGNOSTIC TESTS:                        12.5   11.24 )-----------( 254      ( 26 May 2022 06:41 )             37.7     WBC Count: 11.24 K/uL (05-26 @ 06:41)  WBC Count: 11.93 K/uL (05-25 @ 04:59)  WBC Count: 10.63 K/uL (05-24 @ 07:13)  WBC Count: 11.42 K/uL (05-23 @ 07:21)  WBC Count: 10.31 K/uL (05-22 @ 08:15)    05-25    138  |  103  |  6<L>  ----------------------------<  159<H>  3.5   |  28  |  0.73    Ca    8.8      25 May 2022 04:59        CULTURES:   Clean Catch Clean Catch (Midstream)  05-22 @ 08:25   No growth  --  --      .Blood Blood-Peripheral  05-22 @ 02:24   No growth to date.  --  --        RADIOLOGY:  no new studies

## 2022-05-26 NOTE — DISCHARGE NOTE PROVIDER - NSDCFUADDAPPT_GEN_ALL_CORE_FT
Follow up with Dr. Vee within 5-7 days  Office: [address] [facility] Phone: [phone]  Call to schedule an appointment    Follow up with Interventional radiology for drain removal after following with surgeon, will be referred after first appointment.     Follow up with your primary care physician and get a referral for gastroenterologist for routine colonoscopy.  Follow up with Dr. Vee within 5-7 days  Office: [address] [facility] Phone: [phone]  Call to schedule an appointment    Follow up with Interventional radiology for drain removal after following with surgeon, will be referred after first appointment.     Follow up with your primary care physician for continued management, resume all home medications for blood pressure and diabetes, medications to be readjusted as per PCP. Also get a referral for gastroenterologist for routine colonoscopy.

## 2022-05-26 NOTE — DISCHARGE NOTE PROVIDER - NSDCHHNEEDSERVICEOTHER_GEN_ALL_CORE_FT
IR drain managment IR drain management, record the output and flush with 5-10cc normal saline every 12 hours

## 2022-05-26 NOTE — PROGRESS NOTE ADULT - SUBJECTIVE AND OBJECTIVE BOX
INTERVAL HPI/OVERNIGHT EVENTS:    No acute events overnight.   Pt resting comfortably.   pain on drain site  denies fever, some chills      MEDICATIONS  (STANDING):  dextrose 5% + sodium chloride 0.45%. 1000 milliLiter(s) (125 mL/Hr) IV Continuous <Continuous>  dextrose 5%. 1000 milliLiter(s) (50 mL/Hr) IV Continuous <Continuous>  dextrose 5%. 1000 milliLiter(s) (100 mL/Hr) IV Continuous <Continuous>  dextrose 50% Injectable 25 Gram(s) IV Push once  dextrose 50% Injectable 12.5 Gram(s) IV Push once  dextrose 50% Injectable 25 Gram(s) IV Push once  diatrizoate meglumine/diatrizoate sodium. 30 milliLiter(s) Oral once  glucagon  Injectable 1 milliGRAM(s) IntraMuscular once  heparin   Injectable 5000 Unit(s) SubCutaneous every 8 hours  insulin lispro (ADMELOG) corrective regimen sliding scale   SubCutaneous three times a day before meals  melatonin 3 milliGRAM(s) Oral at bedtime  piperacillin/tazobactam IVPB.. 3.375 Gram(s) IV Intermittent every 8 hours    MEDICATIONS  (PRN):  acetaminophen     Tablet .. 650 milliGRAM(s) Oral every 6 hours PRN Mild Pain (1 - 3), Moderate Pain (4 - 6), Severe Pain (7 - 10)  dextrose Oral Gel 15 Gram(s) Oral once PRN Blood Glucose LESS THAN 70 milliGRAM(s)/deciliter  morphine  - Injectable 2 milliGRAM(s) IV Push every 4 hours PRN Moderate Pain (4 - 6)  ondansetron Injectable 4 milliGRAM(s) IV Push every 6 hours PRN Nausea and/or Vomiting      Vital Signs Last 24 Hrs  T(C): 36.4 (26 May 2022 05:15), Max: 37.3 (25 May 2022 20:30)  T(F): 97.5 (26 May 2022 05:15), Max: 99.1 (25 May 2022 20:30)  HR: 66 (26 May 2022 05:15) (62 - 74)  BP: 148/61 (26 May 2022 05:15) (120/67 - 164/90)  BP(mean): 79 (25 May 2022 20:30) (79 - 109)  RR: 17 (26 May 2022 05:15) (13 - 20)  SpO2: 98% (26 May 2022 05:15) (95% - 100%)      PHYSICAL EXAM  General: Alert and oriented, not in acute distress  Resp: Breathing unlabored  Abdomen: Soft, nondistended, localized pain on RLQ drain site, drain seropurulent  : No cordova catheter, no dysuria or hematuria  Extremities: No pedal edema      I&O's Detail    25 May 2022 07:01  -  26 May 2022 07:00  --------------------------------------------------------  IN:    dextrose 5% + sodium chloride 0.45%: 1875 mL  Total IN: 1875 mL    OUT:  Total OUT: 0 mL    Total NET: 1875 mL          LABS:                        12.5   11.24 )-----------( 254      ( 26 May 2022 06:41 )             37.7             05-25    138  |  103  |  6<L>  ----------------------------<  159<H>  3.5   |  28  |  0.73    Ca    8.8      25 May 2022 04:59

## 2022-05-27 LAB
-  AMIKACIN: SIGNIFICANT CHANGE UP
-  AMOXICILLIN/CLAVULANIC ACID: SIGNIFICANT CHANGE UP
-  AMPICILLIN/SULBACTAM: SIGNIFICANT CHANGE UP
-  AMPICILLIN: SIGNIFICANT CHANGE UP
-  AZTREONAM: SIGNIFICANT CHANGE UP
-  CEFAZOLIN: SIGNIFICANT CHANGE UP
-  CEFEPIME: SIGNIFICANT CHANGE UP
-  CEFOXITIN: SIGNIFICANT CHANGE UP
-  CEFTRIAXONE: SIGNIFICANT CHANGE UP
-  CIPROFLOXACIN: SIGNIFICANT CHANGE UP
-  ERTAPENEM: SIGNIFICANT CHANGE UP
-  GENTAMICIN: SIGNIFICANT CHANGE UP
-  LEVOFLOXACIN: SIGNIFICANT CHANGE UP
-  MEROPENEM: SIGNIFICANT CHANGE UP
-  PIPERACILLIN/TAZOBACTAM: SIGNIFICANT CHANGE UP
-  TOBRAMYCIN: SIGNIFICANT CHANGE UP
-  TRIMETHOPRIM/SULFAMETHOXAZOLE: SIGNIFICANT CHANGE UP
CULTURE RESULTS: SIGNIFICANT CHANGE UP
METHOD TYPE: SIGNIFICANT CHANGE UP
ORGANISM # SPEC MICROSCOPIC CNT: SIGNIFICANT CHANGE UP
ORGANISM # SPEC MICROSCOPIC CNT: SIGNIFICANT CHANGE UP
SPECIMEN SOURCE: SIGNIFICANT CHANGE UP

## 2022-06-01 ENCOUNTER — APPOINTMENT (OUTPATIENT)
Dept: SURGERY | Facility: CLINIC | Age: 72
End: 2022-06-01
Payer: MEDICARE

## 2022-06-01 VITALS
HEIGHT: 61 IN | BODY MASS INDEX: 27.38 KG/M2 | DIASTOLIC BLOOD PRESSURE: 76 MMHG | WEIGHT: 145 LBS | HEART RATE: 78 BPM | OXYGEN SATURATION: 98 % | SYSTOLIC BLOOD PRESSURE: 96 MMHG

## 2022-06-01 VITALS — TEMPERATURE: 97.1 F

## 2022-06-01 DIAGNOSIS — Z63.5 DISRUPTION OF FAMILY BY SEPARATION AND DIVORCE: ICD-10-CM

## 2022-06-01 DIAGNOSIS — E11.9 TYPE 2 DIABETES MELLITUS W/OUT COMPLICATIONS: ICD-10-CM

## 2022-06-01 DIAGNOSIS — Z78.9 OTHER SPECIFIED HEALTH STATUS: ICD-10-CM

## 2022-06-01 DIAGNOSIS — Z83.3 FAMILY HISTORY OF DIABETES MELLITUS: ICD-10-CM

## 2022-06-01 PROCEDURE — 99213 OFFICE O/P EST LOW 20 MIN: CPT

## 2022-06-01 RX ORDER — THIAMINE HCL 50 MG
TABLET ORAL
Refills: 0 | Status: ACTIVE | COMMUNITY

## 2022-06-01 RX ORDER — CALCIUM CARBONATE/VITAMIN D3 600 MG-10
TABLET ORAL
Refills: 0 | Status: ACTIVE | COMMUNITY

## 2022-06-01 RX ORDER — PREGABALIN 300 MG/1
CAPSULE ORAL
Refills: 0 | Status: ACTIVE | COMMUNITY

## 2022-06-01 RX ORDER — ATORVASTATIN CALCIUM 20 MG/1
20 TABLET, FILM COATED ORAL
Refills: 0 | Status: ACTIVE | COMMUNITY

## 2022-06-01 RX ORDER — ACETAMINOPHEN 500 MG
TABLET ORAL
Refills: 0 | Status: ACTIVE | COMMUNITY

## 2022-06-01 RX ORDER — METFORMIN HYDROCHLORIDE 625 MG/1
TABLET ORAL
Refills: 0 | Status: ACTIVE | COMMUNITY

## 2022-06-01 SDOH — SOCIAL STABILITY - SOCIAL INSECURITY: DISRUPTION OF FAMILY BY SEPARATION AND DIVORCE: Z63.5

## 2022-06-01 NOTE — PHYSICAL EXAM
[Normal Breath Sounds] : Normal breath sounds [Normal Heart Sounds] : normal heart sounds [Normal Rate and Rhythm] : normal rate and rhythm [Alert] : alert [Oriented to Person] : oriented to person [Oriented to Place] : oriented to place [Oriented to Time] : oriented to time [Calm] : calm [de-identified] : The patient is alert, well-groomed  [de-identified] : full range of motion and no deformities appreciated.  [de-identified] : IR drain draining small amount of purulent drainage

## 2022-06-01 NOTE — PLAN
[FreeTextEntry1] : Please follow up at the office post CT and as needed for any questions or concerns

## 2022-06-01 NOTE — HISTORY OF PRESENT ILLNESS
[de-identified] : CHITO PAZ is a 71 year old female with PMHx of DM, HTN acute appendicitis in 2018, no interval appendectomy. PSH of cholecystectomy  who presents in the office for post hospitalization visit.  Patient was admitted to [facility] with acute appendicitis with fluid collection. Patient had IR drain placed on 05/25/2022 . Today patient is doing well, offers no complaints. Denies any fevers, chills, nausea, vomiting, diarrhea or constipation. Patient able to tolerate regular diet with normal bowel movements. Patient has IR drain that draining small amount of purulent drainage.  \par

## 2022-06-01 NOTE — ASSESSMENT
[FreeTextEntry1] : CHITO PAZ is a 71 year old female with Perforated appendicitis \par \par \par Today patient is doing well, offers no complaints. \par \par CT of the abdomen and pelvis to reevaluate the abscess\par \par Lab: BMP, CBC \par \par Patient's questions and concerns addressed to patient's satisfaction. \par \par Dr Vee was present at this visit

## 2022-06-01 NOTE — DATA REVIEWED
[FreeTextEntry1] : ACC: [id number] EXAM: CT ABDOMEN AND PELVIS OC\par \par PROCEDURE DATE: 05/21/2022\par \par \par \par INTERPRETATION: CLINICAL INFORMATION: Right lower quadrant abdominal pain\par \par COMPARISON: 4/9/2018\par \par CONTRAST/COMPLICATIONS:\par IV Contrast: NONE\par Oral Contrast: Omnipaque 300\par Complications: None reported at time of study completion\par \par PROCEDURE:\par CT of the Abdomen and Pelvis was performed.\par Sagittal and coronal reformats were performed.\par \par FINDINGS:\par LOWER CHEST: Within normal limits.\par \par LIVER: Calcified granuloma in the right hepatic lobe.\par BILE DUCTS: Postcholecystectomy biliary ductal dilatation.\par GALLBLADDER: Cholecystectomy.\par SPLEEN: Within normal limits.\par PANCREAS: Within normal limits.\par ADRENALS: Nodular thickening of the left adrenal gland, likely in the basis of adrenal adenoma. Right adrenal gland within normal limits.\par KIDNEYS/URETERS: Bilateral nonobstructing renal stones.\par \par BLADDER: Within normal limits.\par REPRODUCTIVE ORGANS: Uterus and bilateral adnexa within normal limits.\par \par BOWEL: No bowel obstruction. Diverticulosis.\par \par Appendicolith at the base of the appendix with appendiceal dilatation and mild periappendiceal stranding, concerning for acute appendicitis.\par \par Oral contrast in the colon. Asymmetric wall thickening of the ascending colon above the level of the ileocecal valve concerning for colonic mass.\par \par PERITONEUM: Fluid collection collection inferior to the base of the cecum measuring 5.4 x 2.6 x 8.2 cm, concerning for periappendiceal abscess. Small amount of pneumoperitoneum.\par VESSELS: Atherosclerotic calcifications.\par RETROPERITONEUM/LYMPH NODES: No lymphadenopathy.\par ABDOMINAL WALL: Within normal limits.\par BONES: Degenerative changes.\par \par IMPRESSION:\par Findings concerning for perforated acute appendicitis with collection adjacent to the base of the appendix measuring 5.4 x 2.6 x 8.2 cm.\par \par Wall thickening of the ascending colon above the level of the ileocecal valve, concerning for neoplasm. Reactive change related to adjacent appendicitis not excluded.\par \par --- End of Report ---\par

## 2022-06-02 LAB
ANION GAP SERPL CALC-SCNC: 16 MMOL/L
BASOPHILS # BLD AUTO: 0.08 K/UL
BASOPHILS NFR BLD AUTO: 0.7 %
BUN SERPL-MCNC: 17 MG/DL
CALCIUM SERPL-MCNC: 10.5 MG/DL
CHLORIDE SERPL-SCNC: 102 MMOL/L
CO2 SERPL-SCNC: 26 MMOL/L
CREAT SERPL-MCNC: 0.77 MG/DL
EGFR: 82 ML/MIN/1.73M2
EOSINOPHIL # BLD AUTO: 0.24 K/UL
EOSINOPHIL NFR BLD AUTO: 2.1 %
GLUCOSE SERPL-MCNC: 90 MG/DL
HCT VFR BLD CALC: 42.7 %
HGB BLD-MCNC: 13.2 G/DL
IMM GRANULOCYTES NFR BLD AUTO: 0.5 %
LYMPHOCYTES # BLD AUTO: 2.79 K/UL
LYMPHOCYTES NFR BLD AUTO: 24.3 %
MAN DIFF?: NORMAL
MCHC RBC-ENTMCNC: 28.3 PG
MCHC RBC-ENTMCNC: 30.9 GM/DL
MCV RBC AUTO: 91.6 FL
MONOCYTES # BLD AUTO: 1.2 K/UL
MONOCYTES NFR BLD AUTO: 10.5 %
NEUTROPHILS # BLD AUTO: 7.09 K/UL
NEUTROPHILS NFR BLD AUTO: 61.9 %
PLATELET # BLD AUTO: 538 K/UL
POTASSIUM SERPL-SCNC: 5.5 MMOL/L
RBC # BLD: 4.66 M/UL
RBC # FLD: 15.4 %
SODIUM SERPL-SCNC: 144 MMOL/L
WBC # FLD AUTO: 11.46 K/UL

## 2022-06-09 ENCOUNTER — EMERGENCY (EMERGENCY)
Facility: HOSPITAL | Age: 72
LOS: 1 days | Discharge: HOME | End: 2022-06-09
Attending: EMERGENCY MEDICINE
Payer: COMMERCIAL

## 2022-06-09 ENCOUNTER — APPOINTMENT (OUTPATIENT)
Dept: CT IMAGING | Facility: HOSPITAL | Age: 72
End: 2022-06-09
Payer: MEDICARE

## 2022-06-09 ENCOUNTER — OUTPATIENT (OUTPATIENT)
Dept: OUTPATIENT SERVICES | Facility: HOSPITAL | Age: 72
LOS: 1 days | End: 2022-06-09
Payer: COMMERCIAL

## 2022-06-09 ENCOUNTER — RESULT REVIEW (OUTPATIENT)
Age: 72
End: 2022-06-09

## 2022-06-09 VITALS
HEART RATE: 98 BPM | SYSTOLIC BLOOD PRESSURE: 125 MMHG | OXYGEN SATURATION: 98 % | TEMPERATURE: 98 F | DIASTOLIC BLOOD PRESSURE: 60 MMHG | RESPIRATION RATE: 18 BRPM | HEIGHT: 62 IN | WEIGHT: 134.92 LBS

## 2022-06-09 DIAGNOSIS — Z90.49 ACQUIRED ABSENCE OF OTHER SPECIFIED PARTS OF DIGESTIVE TRACT: Chronic | ICD-10-CM

## 2022-06-09 DIAGNOSIS — K35.32 ACUTE APPENDICITIS WITH PERFORATION, LOCALIZED PERITONITIS, AND GANGRENE, WITHOUT ABSCESS: ICD-10-CM

## 2022-06-09 LAB — SARS-COV-2 RNA SPEC QL NAA+PROBE: SIGNIFICANT CHANGE UP

## 2022-06-09 PROCEDURE — 99283 EMERGENCY DEPT VISIT LOW MDM: CPT

## 2022-06-09 PROCEDURE — 99282 EMERGENCY DEPT VISIT SF MDM: CPT

## 2022-06-09 PROCEDURE — 87635 SARS-COV-2 COVID-19 AMP PRB: CPT

## 2022-06-09 PROCEDURE — 74176 CT ABD & PELVIS W/O CONTRAST: CPT

## 2022-06-09 PROCEDURE — 74176 CT ABD & PELVIS W/O CONTRAST: CPT | Mod: 26

## 2022-06-09 NOTE — ED PROVIDER NOTE - OBJECTIVE STATEMENT
71 year old female is presenting to ER for a COVID-19 swab test due to procedure tomorrow. No symptoms.

## 2022-06-09 NOTE — ED PROVIDER NOTE - PATIENT PORTAL LINK FT
You can access the FollowMyHealth Patient Portal offered by [facility] by registering at the following website: [email or web address]. By joining [employer]’s FollowMyHealth portal, you will also be able to view your health information using other applications (apps) compatible with our system.

## 2022-06-09 NOTE — ED ADULT NURSE NOTE - CAS ELECT INFOMATION PROVIDED
pt evaluated, treated and discharged by.... Swabbed for covid test/DC instructions pt evaluated, treated and discharged by Louis PRABHAKAR.  Swabbed for covid test/DC instructions

## 2022-06-10 ENCOUNTER — RESULT REVIEW (OUTPATIENT)
Age: 72
End: 2022-06-10

## 2022-06-10 ENCOUNTER — APPOINTMENT (OUTPATIENT)
Dept: INTERVENTIONAL RADIOLOGY/VASCULAR | Facility: HOSPITAL | Age: 72
End: 2022-06-10
Payer: MEDICARE

## 2022-06-10 ENCOUNTER — OUTPATIENT (OUTPATIENT)
Dept: OUTPATIENT SERVICES | Facility: HOSPITAL | Age: 72
LOS: 1 days | End: 2022-06-10
Payer: COMMERCIAL

## 2022-06-10 DIAGNOSIS — K35.21 ACUTE APPENDICITIS WITH GENERALIZED PERITONITIS, WITH ABSCESS: ICD-10-CM

## 2022-06-10 DIAGNOSIS — Z90.49 ACQUIRED ABSENCE OF OTHER SPECIFIED PARTS OF DIGESTIVE TRACT: Chronic | ICD-10-CM

## 2022-06-10 DIAGNOSIS — K65.1 PERITONEAL ABSCESS: ICD-10-CM

## 2022-06-10 DIAGNOSIS — L02.211 CUTANEOUS ABSCESS OF ABDOMINAL WALL: ICD-10-CM

## 2022-06-10 PROCEDURE — 49424 ASSESS CYST CONTRAST INJECT: CPT

## 2022-06-10 PROCEDURE — 76000 FLUOROSCOPY <1 HR PHYS/QHP: CPT

## 2022-06-10 PROCEDURE — 76080 X-RAY EXAM OF FISTULA: CPT

## 2022-06-10 PROCEDURE — 76080 X-RAY EXAM OF FISTULA: CPT | Mod: 26

## 2022-06-10 NOTE — PROCEDURE NOTE - NSICDXPROCEDURE_GEN_ALL_CORE_FT
PROCEDURES:  Injection for abscessogram using existing catheter 10-Yon-2022 10:19:37  Sherley Stafford

## 2022-06-10 NOTE — PROCEDURE NOTE - PROCEDURE FINDINGS AND DETAILS
Tube injected, contrast leaking at sight during injection.  No contrast visualized within a collection or fistula seen.  Tube removed, dressing applied

## 2022-06-22 ENCOUNTER — APPOINTMENT (OUTPATIENT)
Dept: SURGERY | Facility: CLINIC | Age: 72
End: 2022-06-22
Payer: MEDICARE

## 2022-06-22 VITALS
HEIGHT: 61 IN | HEART RATE: 91 BPM | DIASTOLIC BLOOD PRESSURE: 65 MMHG | SYSTOLIC BLOOD PRESSURE: 100 MMHG | WEIGHT: 134 LBS | BODY MASS INDEX: 25.3 KG/M2

## 2022-06-22 DIAGNOSIS — K35.32 ACUTE APPENDICITIS W/ PERFORATION AND LOCALIZED PERITONITIS, W/O ABSCESS: ICD-10-CM

## 2022-06-22 PROCEDURE — 99213 OFFICE O/P EST LOW 20 MIN: CPT

## 2022-06-29 NOTE — ASSESSMENT
[FreeTextEntry1] : CHITO PAZ is a 71 year old female with Perforated appendicitis \par \par \par Today patient is doing well, offers no complaints. \par \par We recommend an laparoscopic interval appendectomy in August \par \par Results of her recent imaging and physical examination findings were discussed in detail. she was informed of significance of findings. All of the options, risks and benefits were explained.

## 2022-06-29 NOTE — PLAN
[FreeTextEntry1] : Ms. CHITO PAZ Patient was told significance of findings, options, risks and benefits were explained. Informed consent for laparoscopic interval appendectomy, possible open and potential risks, benefits and alternatives (surgical options were discussed including non-surgical options or the option of no surgery) to the planned surgery were discussed in depth. All surgical options were discussed including non-surgical treatments. The patient wishes to proceed with surgery. We will plan for surgery on at the next available date, pending any required insurance pre-certification or pre-approval. Patient agrees to obtain any necessary pre-operative evaluations and testing prior to surgery.\par Patient advised to seek immediate medical attention with any acute change in symptoms or with the development of any new or worsening symptoms. Patient's questions and concerns addressed to patient's satisfaction, and patient verbalized an understanding of the information discussed.\par \par Will schedule for the surgery at [facility] at [place].\par \par PST\par COVID\par Medical Clearance

## 2022-06-29 NOTE — HISTORY OF PRESENT ILLNESS
[de-identified] : CHITO PAZ is a 71 year old female with PMHx of DM, HTN acute appendicitis in 2018, no interval appendectomy. PSH of cholecystectomy  who presents in the office for  follow up visit with perforated appendicitis. Patient had IR drain placed on 05/25/2022. She had CT of the abdomen and pelvis on 06/09/2022 result were c/w Improving periappendiceal fluid collection with drainage catheter noted. Today patient is doing well, offers no complaints. Denies any fevers, chills, nausea, vomiting, diarrhea or constipation. Patient able to tolerate regular diet with normal bowel movements. ROSALINA Drain was removed by IR.\par \par PCP MD Benito Hicks  phone number [phone] \par

## 2022-06-29 NOTE — PHYSICAL EXAM
[Normal Breath Sounds] : Normal breath sounds [Normal Heart Sounds] : normal heart sounds [Normal Rate and Rhythm] : normal rate and rhythm [Alert] : alert [Oriented to Person] : oriented to person [Oriented to Place] : oriented to place [Oriented to Time] : oriented to time [Calm] : calm [de-identified] : The patient is alert, well-groomed  [de-identified] : full range of motion and no deformities appreciated.

## 2022-08-03 ENCOUNTER — OUTPATIENT (OUTPATIENT)
Dept: OUTPATIENT SERVICES | Facility: HOSPITAL | Age: 72
LOS: 1 days | End: 2022-08-03
Payer: COMMERCIAL

## 2022-08-03 VITALS
DIASTOLIC BLOOD PRESSURE: 69 MMHG | OXYGEN SATURATION: 96 % | WEIGHT: 149.91 LBS | RESPIRATION RATE: 18 BRPM | HEART RATE: 80 BPM | TEMPERATURE: 99 F | HEIGHT: 59 IN | SYSTOLIC BLOOD PRESSURE: 103 MMHG

## 2022-08-03 DIAGNOSIS — I10 ESSENTIAL (PRIMARY) HYPERTENSION: ICD-10-CM

## 2022-08-03 DIAGNOSIS — Z90.49 ACQUIRED ABSENCE OF OTHER SPECIFIED PARTS OF DIGESTIVE TRACT: Chronic | ICD-10-CM

## 2022-08-03 DIAGNOSIS — K35.32 ACUTE APPENDICITIS WITH PERFORATION, LOCALIZED PERITONITIS, AND GANGRENE, WITHOUT ABSCESS: ICD-10-CM

## 2022-08-03 DIAGNOSIS — E11.9 TYPE 2 DIABETES MELLITUS WITHOUT COMPLICATIONS: ICD-10-CM

## 2022-08-03 DIAGNOSIS — Z98.41 CATARACT EXTRACTION STATUS, RIGHT EYE: Chronic | ICD-10-CM

## 2022-08-03 DIAGNOSIS — Z01.818 ENCOUNTER FOR OTHER PREPROCEDURAL EXAMINATION: ICD-10-CM

## 2022-08-03 LAB — BLD GP AB SCN SERPL QL: SIGNIFICANT CHANGE UP

## 2022-08-03 PROCEDURE — G0463: CPT

## 2022-08-03 NOTE — H&P PST ADULT - NEGATIVE ENMT SYMPTOMS
no hearing difficulty/no ear pain/no vertigo/no sinus symptoms/no nasal congestion/no nasal discharge/no dry mouth/no throat pain/no dysphagia

## 2022-08-03 NOTE — H&P PST ADULT - PROBLEM SELECTOR PLAN 3
Current treatment regimen for diabetes - Metformin 1000mg BID, Jardiance 25mg daily. Advised to c/w regimen  Patient instructed with understanding verbalized to HOLD Metformin the day of surgery.  Patient also instructed with understanding verbalized to HOLD Jardiance 3days (8/8/2022) before surgery to prevent postoperative euglycemic Ketoacidosis.  Last Hgb A1C not known, will obtain from PCP  Fingerstick STAT AM day of surgery ordered  Follow up with PCP/Endocrinologist postoperatively.

## 2022-08-03 NOTE — H&P PST ADULT - HISTORY OF PRESENT ILLNESS
72year old obese female with pmhx of bilateral cataract, cholecystitis, T2DM, hypertension, hyperlipidemia, vitamin b12 deficiency, Vitamin D deficiency, sciatica, seasonal allergies and rhinitis presents with c/o perforated appendicitis. Patient is here today for presurgical testing for scheduled laparoscopic interval appendectomy possible open on 8/8/2022

## 2022-08-03 NOTE — H&P PST ADULT - NSICDXPASTMEDICALHX_GEN_ALL_CORE_FT
PAST MEDICAL HISTORY:  Cataract     Cholecystitis     Diabetes     Essential hypertension     History of sciatica     Hyperlipidemia     Obesity     Seasonal allergies     Seasonal rhinitis     Vitamin B12 deficiency     Vitamin D deficiency

## 2022-08-03 NOTE — H&P PST ADULT - PROBLEM SELECTOR PLAN 1
Patient is scheduled for laparoscopic interval appendectomy possible open on 8/8/2022  Written and oral preoperative instructions given to patient with understanding verbalized.   Instructions given to include using 4% chlorhexidine wash as directed starting 3days before day of surgery (inclusive of day of surgery)  Maintaining NPO status post midnight day before surgery  Stopping aspirin, NSAIDs, herbs, vitamins 7days before surgery   Patient is to expect a phone call day before surgery between the hours of 430- 630pm giving arrival time for surgery day.    Patient today with STOP bang score of 2, Low risk for ALEM  Type/Screen drawn today, results pending

## 2022-08-03 NOTE — H&P PST ADULT - NSANTHOSAYNRD_GEN_A_CORE
No. ALEM screening performed.  STOP BANG Legend: 0-2 = LOW Risk; 3-4 = INTERMEDIATE Risk; 5-8 = HIGH Risk

## 2022-08-03 NOTE — H&P PST ADULT - PROBLEM SELECTOR PLAN 2
Current treatment regimen - Metoprolol Succinate 50mg daily. Advised to continue with current regimen.   Patient instructed with understanding verbalized to take Metoprolol with a sip of water the morning of surgery.   Follow up with PCP/Cardiologist postoperatively

## 2022-08-03 NOTE — H&P PST ADULT - GASTROINTESTINAL COMMENTS
Acute appendicitis with perforation and local peritonitis Hx Cholecystectomy, vitamin b12 deficiency

## 2022-08-07 ENCOUNTER — TRANSCRIPTION ENCOUNTER (OUTPATIENT)
Age: 72
End: 2022-08-07

## 2022-08-08 ENCOUNTER — TRANSCRIPTION ENCOUNTER (OUTPATIENT)
Age: 72
End: 2022-08-08

## 2022-08-08 ENCOUNTER — APPOINTMENT (OUTPATIENT)
Dept: SURGERY | Facility: HOSPITAL | Age: 72
End: 2022-08-08

## 2022-08-08 ENCOUNTER — INPATIENT (INPATIENT)
Facility: HOSPITAL | Age: 72
LOS: 2 days | Discharge: HOME | DRG: 330 | End: 2022-08-11
Attending: SURGERY | Admitting: SURGERY
Payer: COMMERCIAL

## 2022-08-08 VITALS
WEIGHT: 149.91 LBS | HEIGHT: 59 IN | TEMPERATURE: 98 F | DIASTOLIC BLOOD PRESSURE: 71 MMHG | HEART RATE: 75 BPM | OXYGEN SATURATION: 97 % | RESPIRATION RATE: 17 BRPM | SYSTOLIC BLOOD PRESSURE: 104 MMHG

## 2022-08-08 DIAGNOSIS — Z98.41 CATARACT EXTRACTION STATUS, RIGHT EYE: Chronic | ICD-10-CM

## 2022-08-08 DIAGNOSIS — K35.32 ACUTE APPENDICITIS WITH PERFORATION, LOCALIZED PERITONITIS, AND GANGRENE, WITHOUT ABSCESS: ICD-10-CM

## 2022-08-08 DIAGNOSIS — Z90.49 ACQUIRED ABSENCE OF OTHER SPECIFIED PARTS OF DIGESTIVE TRACT: Chronic | ICD-10-CM

## 2022-08-08 LAB
BLD GP AB SCN SERPL QL: SIGNIFICANT CHANGE UP
GLUCOSE BLDC GLUCOMTR-MCNC: 107 MG/DL — HIGH (ref 70–99)
GLUCOSE BLDC GLUCOMTR-MCNC: 117 MG/DL — HIGH (ref 70–99)
GLUCOSE BLDC GLUCOMTR-MCNC: 123 MG/DL — HIGH (ref 70–99)
GLUCOSE BLDC GLUCOMTR-MCNC: 87 MG/DL — SIGNIFICANT CHANGE UP (ref 70–99)

## 2022-08-08 PROCEDURE — 44970 LAPAROSCOPY APPENDECTOMY: CPT | Mod: AS

## 2022-08-08 PROCEDURE — 44970 LAPAROSCOPY APPENDECTOMY: CPT

## 2022-08-08 DEVICE — STAPLER COVIDIEN TRI-STAPLE 45MM BLACK RELOAD: Type: IMPLANTABLE DEVICE | Status: FUNCTIONAL

## 2022-08-08 DEVICE — STAPLER COVIDIEN TRI-STAPLE 60MM BLACK RELOAD: Type: IMPLANTABLE DEVICE | Status: FUNCTIONAL

## 2022-08-08 DEVICE — STAPLER COVIDIEN TRI-STAPLE 45MM PURPLE RELOAD: Type: IMPLANTABLE DEVICE | Status: FUNCTIONAL

## 2022-08-08 DEVICE — SEALANT TISSEEL PRE FILLED FROZEN 4ML: Type: IMPLANTABLE DEVICE | Status: FUNCTIONAL

## 2022-08-08 RX ORDER — METFORMIN HYDROCHLORIDE 850 MG/1
1 TABLET ORAL
Qty: 0 | Refills: 0 | DISCHARGE

## 2022-08-08 RX ORDER — HEPARIN SODIUM 5000 [USP'U]/ML
5000 INJECTION INTRAVENOUS; SUBCUTANEOUS EVERY 8 HOURS
Refills: 0 | Status: DISCONTINUED | OUTPATIENT
Start: 2022-08-09 | End: 2022-08-11

## 2022-08-08 RX ORDER — SODIUM CHLORIDE 9 MG/ML
1000 INJECTION, SOLUTION INTRAVENOUS
Refills: 0 | Status: DISCONTINUED | OUTPATIENT
Start: 2022-08-08 | End: 2022-08-11

## 2022-08-08 RX ORDER — ACETAMINOPHEN 500 MG
650 TABLET ORAL EVERY 6 HOURS
Refills: 0 | Status: DISCONTINUED | OUTPATIENT
Start: 2022-08-08 | End: 2022-08-11

## 2022-08-08 RX ORDER — SODIUM CHLORIDE 9 MG/ML
3 INJECTION INTRAMUSCULAR; INTRAVENOUS; SUBCUTANEOUS EVERY 8 HOURS
Refills: 0 | Status: DISCONTINUED | OUTPATIENT
Start: 2022-08-08 | End: 2022-08-08

## 2022-08-08 RX ORDER — ATORVASTATIN CALCIUM 80 MG/1
20 TABLET, FILM COATED ORAL AT BEDTIME
Refills: 0 | Status: DISCONTINUED | OUTPATIENT
Start: 2022-08-08 | End: 2022-08-11

## 2022-08-08 RX ORDER — METRONIDAZOLE 500 MG
TABLET ORAL
Refills: 0 | Status: DISCONTINUED | OUTPATIENT
Start: 2022-08-08 | End: 2022-08-11

## 2022-08-08 RX ORDER — HYDROMORPHONE HYDROCHLORIDE 2 MG/ML
1 INJECTION INTRAMUSCULAR; INTRAVENOUS; SUBCUTANEOUS
Refills: 0 | Status: DISCONTINUED | OUTPATIENT
Start: 2022-08-08 | End: 2022-08-08

## 2022-08-08 RX ORDER — METRONIDAZOLE 500 MG
500 TABLET ORAL ONCE
Refills: 0 | Status: COMPLETED | OUTPATIENT
Start: 2022-08-08 | End: 2022-08-08

## 2022-08-08 RX ORDER — LATANOPROST 0.05 MG/ML
1 SOLUTION/ DROPS OPHTHALMIC; TOPICAL AT BEDTIME
Refills: 0 | Status: DISCONTINUED | OUTPATIENT
Start: 2022-08-08 | End: 2022-08-11

## 2022-08-08 RX ORDER — CIPROFLOXACIN LACTATE 400MG/40ML
400 VIAL (ML) INTRAVENOUS ONCE
Refills: 0 | Status: COMPLETED | OUTPATIENT
Start: 2022-08-08 | End: 2022-08-08

## 2022-08-08 RX ORDER — FENTANYL CITRATE 50 UG/ML
25 INJECTION INTRAVENOUS
Refills: 0 | Status: DISCONTINUED | OUTPATIENT
Start: 2022-08-08 | End: 2022-08-08

## 2022-08-08 RX ORDER — ACETAMINOPHEN 500 MG
1000 TABLET ORAL EVERY 6 HOURS
Refills: 0 | Status: COMPLETED | OUTPATIENT
Start: 2022-08-08 | End: 2022-08-09

## 2022-08-08 RX ORDER — LIPASE/PROTEASE/AMYLASE 16-48-48K
1 CAPSULE,DELAYED RELEASE (ENTERIC COATED) ORAL
Qty: 0 | Refills: 0 | DISCHARGE

## 2022-08-08 RX ORDER — ASCORBIC ACID 60 MG
1 TABLET,CHEWABLE ORAL
Qty: 0 | Refills: 0 | DISCHARGE

## 2022-08-08 RX ORDER — DORZOLAMIDE HYDROCHLORIDE 20 MG/ML
1 SOLUTION/ DROPS OPHTHALMIC THREE TIMES A DAY
Refills: 0 | Status: DISCONTINUED | OUTPATIENT
Start: 2022-08-08 | End: 2022-08-11

## 2022-08-08 RX ORDER — DORZOLAMIDE HYDROCHLORIDE 20 MG/ML
1 SOLUTION/ DROPS OPHTHALMIC
Qty: 0 | Refills: 0 | DISCHARGE

## 2022-08-08 RX ORDER — OMEGA-3 ACID ETHYL ESTERS 1 G
1 CAPSULE ORAL
Qty: 0 | Refills: 0 | DISCHARGE

## 2022-08-08 RX ORDER — LIPASE/PROTEASE/AMYLASE 16-48-48K
1 CAPSULE,DELAYED RELEASE (ENTERIC COATED) ORAL
Refills: 0 | Status: DISCONTINUED | OUTPATIENT
Start: 2022-08-08 | End: 2022-08-11

## 2022-08-08 RX ORDER — GLUCAGON INJECTION, SOLUTION 0.5 MG/.1ML
1 INJECTION, SOLUTION SUBCUTANEOUS ONCE
Refills: 0 | Status: DISCONTINUED | OUTPATIENT
Start: 2022-08-08 | End: 2022-08-11

## 2022-08-08 RX ORDER — CIPROFLOXACIN LACTATE 400MG/40ML
400 VIAL (ML) INTRAVENOUS EVERY 12 HOURS
Refills: 0 | Status: DISCONTINUED | OUTPATIENT
Start: 2022-08-09 | End: 2022-08-11

## 2022-08-08 RX ORDER — CIPROFLOXACIN LACTATE 400MG/40ML
VIAL (ML) INTRAVENOUS
Refills: 0 | Status: DISCONTINUED | OUTPATIENT
Start: 2022-08-08 | End: 2022-08-11

## 2022-08-08 RX ORDER — EMPAGLIFLOZIN 10 MG/1
1 TABLET, FILM COATED ORAL
Qty: 0 | Refills: 0 | DISCHARGE

## 2022-08-08 RX ORDER — DEXTROSE 50 % IN WATER 50 %
15 SYRINGE (ML) INTRAVENOUS ONCE
Refills: 0 | Status: DISCONTINUED | OUTPATIENT
Start: 2022-08-08 | End: 2022-08-11

## 2022-08-08 RX ORDER — METRONIDAZOLE 500 MG
500 TABLET ORAL EVERY 8 HOURS
Refills: 0 | Status: DISCONTINUED | OUTPATIENT
Start: 2022-08-08 | End: 2022-08-11

## 2022-08-08 RX ORDER — INSULIN LISPRO 100/ML
VIAL (ML) SUBCUTANEOUS
Refills: 0 | Status: DISCONTINUED | OUTPATIENT
Start: 2022-08-08 | End: 2022-08-11

## 2022-08-08 RX ORDER — METOPROLOL TARTRATE 50 MG
50 TABLET ORAL DAILY
Refills: 0 | Status: DISCONTINUED | OUTPATIENT
Start: 2022-08-08 | End: 2022-08-11

## 2022-08-08 RX ORDER — DEXTROSE 50 % IN WATER 50 %
25 SYRINGE (ML) INTRAVENOUS ONCE
Refills: 0 | Status: DISCONTINUED | OUTPATIENT
Start: 2022-08-08 | End: 2022-08-11

## 2022-08-08 RX ORDER — ONDANSETRON 8 MG/1
4 TABLET, FILM COATED ORAL EVERY 6 HOURS
Refills: 0 | Status: DISCONTINUED | OUTPATIENT
Start: 2022-08-08 | End: 2022-08-11

## 2022-08-08 RX ORDER — ERGOCALCIFEROL 1.25 MG/1
1 CAPSULE ORAL
Qty: 0 | Refills: 0 | DISCHARGE

## 2022-08-08 RX ORDER — DEXTROSE 50 % IN WATER 50 %
12.5 SYRINGE (ML) INTRAVENOUS ONCE
Refills: 0 | Status: DISCONTINUED | OUTPATIENT
Start: 2022-08-08 | End: 2022-08-11

## 2022-08-08 RX ORDER — HYDROMORPHONE HYDROCHLORIDE 2 MG/ML
0.5 INJECTION INTRAMUSCULAR; INTRAVENOUS; SUBCUTANEOUS
Refills: 0 | Status: DISCONTINUED | OUTPATIENT
Start: 2022-08-08 | End: 2022-08-09

## 2022-08-08 RX ORDER — ATORVASTATIN CALCIUM 80 MG/1
1 TABLET, FILM COATED ORAL
Qty: 0 | Refills: 0 | DISCHARGE

## 2022-08-08 RX ADMIN — Medication 400 MILLIGRAM(S): at 18:05

## 2022-08-08 RX ADMIN — Medication 200 MILLIGRAM(S): at 16:14

## 2022-08-08 RX ADMIN — Medication 100 MILLIGRAM(S): at 21:29

## 2022-08-08 RX ADMIN — SODIUM CHLORIDE 125 MILLILITER(S): 9 INJECTION, SOLUTION INTRAVENOUS at 21:29

## 2022-08-08 RX ADMIN — Medication 1000 MILLIGRAM(S): at 18:40

## 2022-08-08 RX ADMIN — Medication 100 MILLIGRAM(S): at 16:14

## 2022-08-08 RX ADMIN — LATANOPROST 1 DROP(S): 0.05 SOLUTION/ DROPS OPHTHALMIC; TOPICAL at 21:30

## 2022-08-08 RX ADMIN — DORZOLAMIDE HYDROCHLORIDE 1 DROP(S): 20 SOLUTION/ DROPS OPHTHALMIC at 22:51

## 2022-08-08 RX ADMIN — ONDANSETRON 4 MILLIGRAM(S): 8 TABLET, FILM COATED ORAL at 12:39

## 2022-08-08 RX ADMIN — ATORVASTATIN CALCIUM 20 MILLIGRAM(S): 80 TABLET, FILM COATED ORAL at 21:29

## 2022-08-08 RX ADMIN — Medication 50 MILLIGRAM(S): at 18:04

## 2022-08-08 RX ADMIN — DORZOLAMIDE HYDROCHLORIDE 1 DROP(S): 20 SOLUTION/ DROPS OPHTHALMIC at 16:14

## 2022-08-08 RX ADMIN — Medication 1 CAPSULE(S): at 18:05

## 2022-08-08 NOTE — BRIEF OPERATIVE NOTE - NSICDXBRIEFPROCEDURE_GEN_ALL_CORE_FT
PROCEDURES:  Laparoscopic interval appendectomy 08-Aug-2022 12:10:13  Lety Klein  Lysis of intestinal adhesions 08-Aug-2022 12:13:29  Lety Klein  Serosal tear repair 08-Aug-2022 12:13:45  Lety Klein

## 2022-08-08 NOTE — ASU PATIENT PROFILE, ADULT - NS TRANSFER HEARING AID
I placed the order for baseline sleep study and spoke to the patients wife. She was transferred to the sleep lab to schedule in lab study. none

## 2022-08-08 NOTE — BRIEF OPERATIVE NOTE - OPERATION/FINDINGS
see dictation see dictation # [id number]  Extensive adhesions of omentum to abdominal wall RLQ and pelvis. Terminal ileum adherent to prior abscess cavity which caused extensive dense inflammatory changes in the RLQ.

## 2022-08-09 ENCOUNTER — RESULT REVIEW (OUTPATIENT)
Age: 72
End: 2022-08-09

## 2022-08-09 LAB
A1C WITH ESTIMATED AVERAGE GLUCOSE RESULT: 6.2 % — HIGH (ref 4–5.6)
ANION GAP SERPL CALC-SCNC: 5 MMOL/L — SIGNIFICANT CHANGE UP (ref 5–17)
ANION GAP SERPL CALC-SCNC: 7 MMOL/L — SIGNIFICANT CHANGE UP (ref 5–17)
BASOPHILS # BLD AUTO: 0.02 K/UL — SIGNIFICANT CHANGE UP (ref 0–0.2)
BASOPHILS # BLD AUTO: 0.03 K/UL — SIGNIFICANT CHANGE UP (ref 0–0.2)
BASOPHILS NFR BLD AUTO: 0.2 % — SIGNIFICANT CHANGE UP (ref 0–2)
BASOPHILS NFR BLD AUTO: 0.3 % — SIGNIFICANT CHANGE UP (ref 0–2)
BUN SERPL-MCNC: 16 MG/DL — SIGNIFICANT CHANGE UP (ref 7–18)
BUN SERPL-MCNC: 19 MG/DL — HIGH (ref 7–18)
CALCIUM SERPL-MCNC: 8.5 MG/DL — SIGNIFICANT CHANGE UP (ref 8.4–10.5)
CALCIUM SERPL-MCNC: 8.7 MG/DL — SIGNIFICANT CHANGE UP (ref 8.4–10.5)
CHLORIDE SERPL-SCNC: 107 MMOL/L — SIGNIFICANT CHANGE UP (ref 96–108)
CHLORIDE SERPL-SCNC: 110 MMOL/L — HIGH (ref 96–108)
CO2 SERPL-SCNC: 24 MMOL/L — SIGNIFICANT CHANGE UP (ref 22–31)
CO2 SERPL-SCNC: 26 MMOL/L — SIGNIFICANT CHANGE UP (ref 22–31)
CREAT SERPL-MCNC: 0.66 MG/DL — SIGNIFICANT CHANGE UP (ref 0.5–1.3)
CREAT SERPL-MCNC: 0.69 MG/DL — SIGNIFICANT CHANGE UP (ref 0.5–1.3)
EGFR: 92 ML/MIN/1.73M2 — SIGNIFICANT CHANGE UP
EGFR: 93 ML/MIN/1.73M2 — SIGNIFICANT CHANGE UP
EOSINOPHIL # BLD AUTO: 0.12 K/UL — SIGNIFICANT CHANGE UP (ref 0–0.5)
EOSINOPHIL # BLD AUTO: 0.14 K/UL — SIGNIFICANT CHANGE UP (ref 0–0.5)
EOSINOPHIL NFR BLD AUTO: 1.3 % — SIGNIFICANT CHANGE UP (ref 0–6)
EOSINOPHIL NFR BLD AUTO: 1.4 % — SIGNIFICANT CHANGE UP (ref 0–6)
ESTIMATED AVERAGE GLUCOSE: 131 MG/DL — HIGH (ref 68–114)
GLUCOSE BLDC GLUCOMTR-MCNC: 118 MG/DL — HIGH (ref 70–99)
GLUCOSE BLDC GLUCOMTR-MCNC: 122 MG/DL — HIGH (ref 70–99)
GLUCOSE BLDC GLUCOMTR-MCNC: 125 MG/DL — HIGH (ref 70–99)
GLUCOSE BLDC GLUCOMTR-MCNC: 129 MG/DL — HIGH (ref 70–99)
GLUCOSE SERPL-MCNC: 114 MG/DL — HIGH (ref 70–99)
GLUCOSE SERPL-MCNC: 97 MG/DL — SIGNIFICANT CHANGE UP (ref 70–99)
HCT VFR BLD CALC: 34.7 % — SIGNIFICANT CHANGE UP (ref 34.5–45)
HCT VFR BLD CALC: 37.3 % — SIGNIFICANT CHANGE UP (ref 34.5–45)
HGB BLD-MCNC: 11 G/DL — LOW (ref 11.5–15.5)
HGB BLD-MCNC: 11.8 G/DL — SIGNIFICANT CHANGE UP (ref 11.5–15.5)
IMM GRANULOCYTES NFR BLD AUTO: 0.2 % — SIGNIFICANT CHANGE UP (ref 0–1.5)
IMM GRANULOCYTES NFR BLD AUTO: 0.2 % — SIGNIFICANT CHANGE UP (ref 0–1.5)
LYMPHOCYTES # BLD AUTO: 1.6 K/UL — SIGNIFICANT CHANGE UP (ref 1–3.3)
LYMPHOCYTES # BLD AUTO: 1.98 K/UL — SIGNIFICANT CHANGE UP (ref 1–3.3)
LYMPHOCYTES # BLD AUTO: 17.3 % — SIGNIFICANT CHANGE UP (ref 13–44)
LYMPHOCYTES # BLD AUTO: 20.1 % — SIGNIFICANT CHANGE UP (ref 13–44)
MCHC RBC-ENTMCNC: 28.7 PG — SIGNIFICANT CHANGE UP (ref 27–34)
MCHC RBC-ENTMCNC: 29.1 PG — SIGNIFICANT CHANGE UP (ref 27–34)
MCHC RBC-ENTMCNC: 31.6 GM/DL — LOW (ref 32–36)
MCHC RBC-ENTMCNC: 31.7 GM/DL — LOW (ref 32–36)
MCV RBC AUTO: 90.6 FL — SIGNIFICANT CHANGE UP (ref 80–100)
MCV RBC AUTO: 91.9 FL — SIGNIFICANT CHANGE UP (ref 80–100)
MONOCYTES # BLD AUTO: 0.61 K/UL — SIGNIFICANT CHANGE UP (ref 0–0.9)
MONOCYTES # BLD AUTO: 0.74 K/UL — SIGNIFICANT CHANGE UP (ref 0–0.9)
MONOCYTES NFR BLD AUTO: 6.2 % — SIGNIFICANT CHANGE UP (ref 2–14)
MONOCYTES NFR BLD AUTO: 8 % — SIGNIFICANT CHANGE UP (ref 2–14)
NEUTROPHILS # BLD AUTO: 6.75 K/UL — SIGNIFICANT CHANGE UP (ref 1.8–7.4)
NEUTROPHILS # BLD AUTO: 7.09 K/UL — SIGNIFICANT CHANGE UP (ref 1.8–7.4)
NEUTROPHILS NFR BLD AUTO: 71.8 % — SIGNIFICANT CHANGE UP (ref 43–77)
NEUTROPHILS NFR BLD AUTO: 73 % — SIGNIFICANT CHANGE UP (ref 43–77)
NRBC # BLD: 0 /100 WBCS — SIGNIFICANT CHANGE UP (ref 0–0)
NRBC # BLD: 0 /100 WBCS — SIGNIFICANT CHANGE UP (ref 0–0)
PLATELET # BLD AUTO: 201 K/UL — SIGNIFICANT CHANGE UP (ref 150–400)
PLATELET # BLD AUTO: 215 K/UL — SIGNIFICANT CHANGE UP (ref 150–400)
POTASSIUM SERPL-MCNC: 4.4 MMOL/L — SIGNIFICANT CHANGE UP (ref 3.5–5.3)
POTASSIUM SERPL-MCNC: 5 MMOL/L — SIGNIFICANT CHANGE UP (ref 3.5–5.3)
POTASSIUM SERPL-SCNC: 4.4 MMOL/L — SIGNIFICANT CHANGE UP (ref 3.5–5.3)
POTASSIUM SERPL-SCNC: 5 MMOL/L — SIGNIFICANT CHANGE UP (ref 3.5–5.3)
RBC # BLD: 3.83 M/UL — SIGNIFICANT CHANGE UP (ref 3.8–5.2)
RBC # BLD: 4.06 M/UL — SIGNIFICANT CHANGE UP (ref 3.8–5.2)
RBC # FLD: 15.2 % — HIGH (ref 10.3–14.5)
RBC # FLD: 15.4 % — HIGH (ref 10.3–14.5)
SODIUM SERPL-SCNC: 138 MMOL/L — SIGNIFICANT CHANGE UP (ref 135–145)
SODIUM SERPL-SCNC: 141 MMOL/L — SIGNIFICANT CHANGE UP (ref 135–145)
WBC # BLD: 9.25 K/UL — SIGNIFICANT CHANGE UP (ref 3.8–10.5)
WBC # BLD: 9.87 K/UL — SIGNIFICANT CHANGE UP (ref 3.8–10.5)
WBC # FLD AUTO: 9.25 K/UL — SIGNIFICANT CHANGE UP (ref 3.8–10.5)
WBC # FLD AUTO: 9.87 K/UL — SIGNIFICANT CHANGE UP (ref 3.8–10.5)

## 2022-08-09 PROCEDURE — 88304 TISSUE EXAM BY PATHOLOGIST: CPT | Mod: 26

## 2022-08-09 RX ORDER — SODIUM CHLORIDE 9 MG/ML
1000 INJECTION INTRAMUSCULAR; INTRAVENOUS; SUBCUTANEOUS ONCE
Refills: 0 | Status: COMPLETED | OUTPATIENT
Start: 2022-08-09 | End: 2022-08-09

## 2022-08-09 RX ADMIN — DORZOLAMIDE HYDROCHLORIDE 1 DROP(S): 20 SOLUTION/ DROPS OPHTHALMIC at 14:41

## 2022-08-09 RX ADMIN — LATANOPROST 1 DROP(S): 0.05 SOLUTION/ DROPS OPHTHALMIC; TOPICAL at 21:30

## 2022-08-09 RX ADMIN — Medication 1 CAPSULE(S): at 09:24

## 2022-08-09 RX ADMIN — Medication 100 MILLIGRAM(S): at 21:30

## 2022-08-09 RX ADMIN — Medication 200 MILLIGRAM(S): at 17:56

## 2022-08-09 RX ADMIN — Medication 650 MILLIGRAM(S): at 19:38

## 2022-08-09 RX ADMIN — HEPARIN SODIUM 5000 UNIT(S): 5000 INJECTION INTRAVENOUS; SUBCUTANEOUS at 14:42

## 2022-08-09 RX ADMIN — Medication 1 CAPSULE(S): at 11:54

## 2022-08-09 RX ADMIN — Medication 1000 MILLIGRAM(S): at 06:00

## 2022-08-09 RX ADMIN — HEPARIN SODIUM 5000 UNIT(S): 5000 INJECTION INTRAVENOUS; SUBCUTANEOUS at 05:24

## 2022-08-09 RX ADMIN — ATORVASTATIN CALCIUM 20 MILLIGRAM(S): 80 TABLET, FILM COATED ORAL at 21:30

## 2022-08-09 RX ADMIN — Medication 650 MILLIGRAM(S): at 20:30

## 2022-08-09 RX ADMIN — HEPARIN SODIUM 5000 UNIT(S): 5000 INJECTION INTRAVENOUS; SUBCUTANEOUS at 21:30

## 2022-08-09 RX ADMIN — Medication 1 CAPSULE(S): at 17:55

## 2022-08-09 RX ADMIN — Medication 100 MILLIGRAM(S): at 14:40

## 2022-08-09 RX ADMIN — Medication 400 MILLIGRAM(S): at 11:53

## 2022-08-09 RX ADMIN — Medication 100 MILLIGRAM(S): at 05:24

## 2022-08-09 RX ADMIN — DORZOLAMIDE HYDROCHLORIDE 1 DROP(S): 20 SOLUTION/ DROPS OPHTHALMIC at 21:29

## 2022-08-09 RX ADMIN — Medication 1000 MILLIGRAM(S): at 00:35

## 2022-08-09 RX ADMIN — Medication 50 MILLIGRAM(S): at 17:59

## 2022-08-09 RX ADMIN — SODIUM CHLORIDE 1000 MILLILITER(S): 9 INJECTION INTRAMUSCULAR; INTRAVENOUS; SUBCUTANEOUS at 07:58

## 2022-08-09 RX ADMIN — SODIUM CHLORIDE 1000 MILLILITER(S): 9 INJECTION INTRAMUSCULAR; INTRAVENOUS; SUBCUTANEOUS at 01:13

## 2022-08-09 RX ADMIN — DORZOLAMIDE HYDROCHLORIDE 1 DROP(S): 20 SOLUTION/ DROPS OPHTHALMIC at 05:26

## 2022-08-09 RX ADMIN — SODIUM CHLORIDE 125 MILLILITER(S): 9 INJECTION, SOLUTION INTRAVENOUS at 05:23

## 2022-08-09 RX ADMIN — Medication 50 MILLIGRAM(S): at 17:55

## 2022-08-09 RX ADMIN — Medication 200 MILLIGRAM(S): at 05:24

## 2022-08-09 RX ADMIN — Medication 400 MILLIGRAM(S): at 05:23

## 2022-08-09 RX ADMIN — Medication 400 MILLIGRAM(S): at 00:09

## 2022-08-09 RX ADMIN — Medication 1000 MILLIGRAM(S): at 12:00

## 2022-08-09 NOTE — CHART NOTE - NSCHARTNOTEFT_GEN_A_CORE
Called by RN to evaluate patient   BP 86/47  HR 81    Pt seen at bedside  Resting comfortably  Denies headache, dizziness, lightheadedness    Check CBC, BMP  NS bolus   Will recheck vitals after

## 2022-08-10 LAB
ANION GAP SERPL CALC-SCNC: 6 MMOL/L — SIGNIFICANT CHANGE UP (ref 5–17)
BUN SERPL-MCNC: 5 MG/DL — LOW (ref 7–18)
CALCIUM SERPL-MCNC: 9 MG/DL — SIGNIFICANT CHANGE UP (ref 8.4–10.5)
CHLORIDE SERPL-SCNC: 108 MMOL/L — SIGNIFICANT CHANGE UP (ref 96–108)
CO2 SERPL-SCNC: 28 MMOL/L — SIGNIFICANT CHANGE UP (ref 22–31)
CREAT SERPL-MCNC: 0.62 MG/DL — SIGNIFICANT CHANGE UP (ref 0.5–1.3)
EGFR: 94 ML/MIN/1.73M2 — SIGNIFICANT CHANGE UP
GLUCOSE BLDC GLUCOMTR-MCNC: 120 MG/DL — HIGH (ref 70–99)
GLUCOSE BLDC GLUCOMTR-MCNC: 159 MG/DL — HIGH (ref 70–99)
GLUCOSE BLDC GLUCOMTR-MCNC: 95 MG/DL — SIGNIFICANT CHANGE UP (ref 70–99)
GLUCOSE BLDC GLUCOMTR-MCNC: 98 MG/DL — SIGNIFICANT CHANGE UP (ref 70–99)
GLUCOSE SERPL-MCNC: 148 MG/DL — HIGH (ref 70–99)
HCT VFR BLD CALC: 36.1 % — SIGNIFICANT CHANGE UP (ref 34.5–45)
HCT VFR BLD CALC: 37.3 % — SIGNIFICANT CHANGE UP (ref 34.5–45)
HGB BLD-MCNC: 11.5 G/DL — SIGNIFICANT CHANGE UP (ref 11.5–15.5)
HGB BLD-MCNC: 11.7 G/DL — SIGNIFICANT CHANGE UP (ref 11.5–15.5)
MCHC RBC-ENTMCNC: 28.5 PG — SIGNIFICANT CHANGE UP (ref 27–34)
MCHC RBC-ENTMCNC: 28.7 PG — SIGNIFICANT CHANGE UP (ref 27–34)
MCHC RBC-ENTMCNC: 31.4 GM/DL — LOW (ref 32–36)
MCHC RBC-ENTMCNC: 31.9 GM/DL — LOW (ref 32–36)
MCV RBC AUTO: 90 FL — SIGNIFICANT CHANGE UP (ref 80–100)
MCV RBC AUTO: 91 FL — SIGNIFICANT CHANGE UP (ref 80–100)
NRBC # BLD: 0 /100 WBCS — SIGNIFICANT CHANGE UP (ref 0–0)
NRBC # BLD: 0 /100 WBCS — SIGNIFICANT CHANGE UP (ref 0–0)
PLATELET # BLD AUTO: 213 K/UL — SIGNIFICANT CHANGE UP (ref 150–400)
PLATELET # BLD AUTO: 214 K/UL — SIGNIFICANT CHANGE UP (ref 150–400)
POTASSIUM SERPL-MCNC: 4.1 MMOL/L — SIGNIFICANT CHANGE UP (ref 3.5–5.3)
POTASSIUM SERPL-SCNC: 4.1 MMOL/L — SIGNIFICANT CHANGE UP (ref 3.5–5.3)
RBC # BLD: 4.01 M/UL — SIGNIFICANT CHANGE UP (ref 3.8–5.2)
RBC # BLD: 4.1 M/UL — SIGNIFICANT CHANGE UP (ref 3.8–5.2)
RBC # FLD: 15.5 % — HIGH (ref 10.3–14.5)
RBC # FLD: 15.7 % — HIGH (ref 10.3–14.5)
SODIUM SERPL-SCNC: 142 MMOL/L — SIGNIFICANT CHANGE UP (ref 135–145)
WBC # BLD: 13.18 K/UL — HIGH (ref 3.8–10.5)
WBC # BLD: 13.48 K/UL — HIGH (ref 3.8–10.5)
WBC # FLD AUTO: 13.18 K/UL — HIGH (ref 3.8–10.5)
WBC # FLD AUTO: 13.48 K/UL — HIGH (ref 3.8–10.5)

## 2022-08-10 RX ORDER — KETOROLAC TROMETHAMINE 30 MG/ML
15 SYRINGE (ML) INJECTION EVERY 6 HOURS
Refills: 0 | Status: DISCONTINUED | OUTPATIENT
Start: 2022-08-10 | End: 2022-08-11

## 2022-08-10 RX ORDER — OXYCODONE HYDROCHLORIDE 5 MG/1
5 TABLET ORAL
Refills: 0 | Status: DISCONTINUED | OUTPATIENT
Start: 2022-08-10 | End: 2022-08-11

## 2022-08-10 RX ADMIN — Medication 1 CAPSULE(S): at 12:21

## 2022-08-10 RX ADMIN — Medication 200 MILLIGRAM(S): at 05:05

## 2022-08-10 RX ADMIN — Medication 100 MILLIGRAM(S): at 15:41

## 2022-08-10 RX ADMIN — HEPARIN SODIUM 5000 UNIT(S): 5000 INJECTION INTRAVENOUS; SUBCUTANEOUS at 21:44

## 2022-08-10 RX ADMIN — Medication 100 MILLIGRAM(S): at 05:06

## 2022-08-10 RX ADMIN — ATORVASTATIN CALCIUM 20 MILLIGRAM(S): 80 TABLET, FILM COATED ORAL at 21:44

## 2022-08-10 RX ADMIN — LATANOPROST 1 DROP(S): 0.05 SOLUTION/ DROPS OPHTHALMIC; TOPICAL at 21:46

## 2022-08-10 RX ADMIN — Medication 50 MILLIGRAM(S): at 05:04

## 2022-08-10 RX ADMIN — Medication 1 CAPSULE(S): at 08:19

## 2022-08-10 RX ADMIN — Medication 650 MILLIGRAM(S): at 13:00

## 2022-08-10 RX ADMIN — HEPARIN SODIUM 5000 UNIT(S): 5000 INJECTION INTRAVENOUS; SUBCUTANEOUS at 05:04

## 2022-08-10 RX ADMIN — Medication 50 MILLIGRAM(S): at 18:18

## 2022-08-10 RX ADMIN — Medication 15 MILLIGRAM(S): at 22:20

## 2022-08-10 RX ADMIN — Medication 100 MILLIGRAM(S): at 21:45

## 2022-08-10 RX ADMIN — HEPARIN SODIUM 5000 UNIT(S): 5000 INJECTION INTRAVENOUS; SUBCUTANEOUS at 14:39

## 2022-08-10 RX ADMIN — DORZOLAMIDE HYDROCHLORIDE 1 DROP(S): 20 SOLUTION/ DROPS OPHTHALMIC at 21:46

## 2022-08-10 RX ADMIN — Medication 200 MILLIGRAM(S): at 18:18

## 2022-08-10 RX ADMIN — Medication 650 MILLIGRAM(S): at 12:20

## 2022-08-10 RX ADMIN — Medication 1 CAPSULE(S): at 16:48

## 2022-08-10 RX ADMIN — DORZOLAMIDE HYDROCHLORIDE 1 DROP(S): 20 SOLUTION/ DROPS OPHTHALMIC at 15:38

## 2022-08-10 RX ADMIN — DORZOLAMIDE HYDROCHLORIDE 1 DROP(S): 20 SOLUTION/ DROPS OPHTHALMIC at 05:07

## 2022-08-10 RX ADMIN — Medication 15 MILLIGRAM(S): at 22:01

## 2022-08-10 NOTE — PROGRESS NOTE ADULT - NSPROGADDITIONALINFOA_GEN_ALL_CORE
Pt seen and examined 8/9 in AM. Helped pt OOB as she wanted to use bathroom. Diallo d/c'd able to void. ROSALINA serosanguinous.  C/o mild incisional tenderness. Passing flatus. Noted BP slightly low overnight, responded to fluid bolus.  Abd- Soft- nondistended. Mild incisional tenderness, no guarding no rebound. ROSALINA serosanguinous. Advance diet, resume home meds. Will reassess for possible discharge. Given difficulty of case favor keeping another day.
Pt seen and examined. Resting in bed comfortably. Reports abdominal pain when moving and sitting up, mostly on left side and suprapubic. Reports some Right sided pain as well but less than left.  Labs reviewed, + incr WBC to 13, Tmax 99.3.  + flatus. Tolerating diet, reports would like to eat different food. Has been OOB to void. Reports she feels ok to go home.  Abd soft- nondistended, + LLQ tenderness at 15 mm port site. Mild RLQ, suprapubic tenderness, no guarding no rebound. ROSALINA serosanguinous.   Repeat CBC, if WBC still the same can consider discharge

## 2022-08-10 NOTE — PATIENT PROFILE ADULT - FALL HARM RISK - RISK INTERVENTIONS

## 2022-08-11 ENCOUNTER — TRANSCRIPTION ENCOUNTER (OUTPATIENT)
Age: 72
End: 2022-08-11

## 2022-08-11 VITALS
SYSTOLIC BLOOD PRESSURE: 139 MMHG | DIASTOLIC BLOOD PRESSURE: 75 MMHG | TEMPERATURE: 98 F | HEART RATE: 76 BPM | RESPIRATION RATE: 18 BRPM | OXYGEN SATURATION: 96 %

## 2022-08-11 LAB
ALBUMIN SERPL ELPH-MCNC: 2.4 G/DL — LOW (ref 3.5–5)
ALP SERPL-CCNC: 56 U/L — SIGNIFICANT CHANGE UP (ref 40–120)
ALT FLD-CCNC: 18 U/L DA — SIGNIFICANT CHANGE UP (ref 10–60)
ANION GAP SERPL CALC-SCNC: 6 MMOL/L — SIGNIFICANT CHANGE UP (ref 5–17)
AST SERPL-CCNC: 16 U/L — SIGNIFICANT CHANGE UP (ref 10–40)
BILIRUB SERPL-MCNC: 0.5 MG/DL — SIGNIFICANT CHANGE UP (ref 0.2–1.2)
BUN SERPL-MCNC: 8 MG/DL — SIGNIFICANT CHANGE UP (ref 7–18)
CALCIUM SERPL-MCNC: 9 MG/DL — SIGNIFICANT CHANGE UP (ref 8.4–10.5)
CHLORIDE SERPL-SCNC: 107 MMOL/L — SIGNIFICANT CHANGE UP (ref 96–108)
CO2 SERPL-SCNC: 27 MMOL/L — SIGNIFICANT CHANGE UP (ref 22–31)
CREAT SERPL-MCNC: 0.63 MG/DL — SIGNIFICANT CHANGE UP (ref 0.5–1.3)
EGFR: 94 ML/MIN/1.73M2 — SIGNIFICANT CHANGE UP
GLUCOSE BLDC GLUCOMTR-MCNC: 104 MG/DL — HIGH (ref 70–99)
GLUCOSE BLDC GLUCOMTR-MCNC: 139 MG/DL — HIGH (ref 70–99)
GLUCOSE SERPL-MCNC: 122 MG/DL — HIGH (ref 70–99)
HCT VFR BLD CALC: 37.1 % — SIGNIFICANT CHANGE UP (ref 34.5–45)
HGB BLD-MCNC: 11.9 G/DL — SIGNIFICANT CHANGE UP (ref 11.5–15.5)
MCHC RBC-ENTMCNC: 28.7 PG — SIGNIFICANT CHANGE UP (ref 27–34)
MCHC RBC-ENTMCNC: 32.1 GM/DL — SIGNIFICANT CHANGE UP (ref 32–36)
MCV RBC AUTO: 89.4 FL — SIGNIFICANT CHANGE UP (ref 80–100)
NRBC # BLD: 0 /100 WBCS — SIGNIFICANT CHANGE UP (ref 0–0)
PLATELET # BLD AUTO: 226 K/UL — SIGNIFICANT CHANGE UP (ref 150–400)
POTASSIUM SERPL-MCNC: 3.9 MMOL/L — SIGNIFICANT CHANGE UP (ref 3.5–5.3)
POTASSIUM SERPL-SCNC: 3.9 MMOL/L — SIGNIFICANT CHANGE UP (ref 3.5–5.3)
PROT SERPL-MCNC: 6.8 G/DL — SIGNIFICANT CHANGE UP (ref 6–8.3)
RBC # BLD: 4.15 M/UL — SIGNIFICANT CHANGE UP (ref 3.8–5.2)
RBC # FLD: 15.1 % — HIGH (ref 10.3–14.5)
SODIUM SERPL-SCNC: 140 MMOL/L — SIGNIFICANT CHANGE UP (ref 135–145)
SURGICAL PATHOLOGY STUDY: SIGNIFICANT CHANGE UP
WBC # BLD: 9.18 K/UL — SIGNIFICANT CHANGE UP (ref 3.8–10.5)
WBC # FLD AUTO: 9.18 K/UL — SIGNIFICANT CHANGE UP (ref 3.8–10.5)

## 2022-08-11 PROCEDURE — 36415 COLL VENOUS BLD VENIPUNCTURE: CPT

## 2022-08-11 PROCEDURE — 86901 BLOOD TYPING SEROLOGIC RH(D): CPT

## 2022-08-11 PROCEDURE — 86900 BLOOD TYPING SEROLOGIC ABO: CPT

## 2022-08-11 PROCEDURE — 85025 COMPLETE CBC W/AUTO DIFF WBC: CPT

## 2022-08-11 PROCEDURE — 86850 RBC ANTIBODY SCREEN: CPT

## 2022-08-11 PROCEDURE — 85027 COMPLETE CBC AUTOMATED: CPT

## 2022-08-11 PROCEDURE — C1889: CPT

## 2022-08-11 PROCEDURE — 82962 GLUCOSE BLOOD TEST: CPT

## 2022-08-11 PROCEDURE — 80048 BASIC METABOLIC PNL TOTAL CA: CPT

## 2022-08-11 PROCEDURE — 88304 TISSUE EXAM BY PATHOLOGIST: CPT

## 2022-08-11 PROCEDURE — 80053 COMPREHEN METABOLIC PANEL: CPT

## 2022-08-11 PROCEDURE — 83036 HEMOGLOBIN GLYCOSYLATED A1C: CPT

## 2022-08-11 RX ORDER — METRONIDAZOLE 500 MG
1 TABLET ORAL
Qty: 15 | Refills: 0
Start: 2022-08-11 | End: 2022-08-15

## 2022-08-11 RX ORDER — CIPROFLOXACIN LACTATE 400MG/40ML
1 VIAL (ML) INTRAVENOUS
Qty: 10 | Refills: 0
Start: 2022-08-11 | End: 2022-08-15

## 2022-08-11 RX ORDER — TRAMADOL HYDROCHLORIDE 50 MG/1
25 TABLET ORAL
Qty: 5 | Refills: 0
Start: 2022-08-11

## 2022-08-11 RX ADMIN — Medication 100 MILLIGRAM(S): at 05:28

## 2022-08-11 RX ADMIN — DORZOLAMIDE HYDROCHLORIDE 1 DROP(S): 20 SOLUTION/ DROPS OPHTHALMIC at 14:28

## 2022-08-11 RX ADMIN — Medication 50 MILLIGRAM(S): at 05:27

## 2022-08-11 RX ADMIN — Medication 200 MILLIGRAM(S): at 05:36

## 2022-08-11 RX ADMIN — DORZOLAMIDE HYDROCHLORIDE 1 DROP(S): 20 SOLUTION/ DROPS OPHTHALMIC at 05:30

## 2022-08-11 RX ADMIN — Medication 1 CAPSULE(S): at 12:29

## 2022-08-11 RX ADMIN — Medication 100 MILLIGRAM(S): at 14:39

## 2022-08-11 RX ADMIN — Medication 50 MILLIGRAM(S): at 05:34

## 2022-08-11 RX ADMIN — Medication 1 CAPSULE(S): at 09:23

## 2022-08-11 RX ADMIN — HEPARIN SODIUM 5000 UNIT(S): 5000 INJECTION INTRAVENOUS; SUBCUTANEOUS at 14:28

## 2022-08-11 RX ADMIN — HEPARIN SODIUM 5000 UNIT(S): 5000 INJECTION INTRAVENOUS; SUBCUTANEOUS at 05:28

## 2022-08-11 NOTE — DISCHARGE NOTE PROVIDER - CARE PROVIDER_API CALL
Mer Vee)  Surgery  [phone] [place] [address]  Phone: [phone]  Fax: [phone]  Follow Up Time: 2 weeks

## 2022-08-11 NOTE — PROGRESS NOTE ADULT - SUBJECTIVE AND OBJECTIVE BOX
INTERVAL HPI/OVERNIGHT EVENTS:  Patient seen and examined at bedside   Pt resting comfortably. Admits to pain that started yesterday and is about the same. She says most of her pain is in the LLQ.   Patient is tolerating regular diet, denies N/V.   Admits to Flatus/ BM.   Denies any headache, lightheadedness, chest pain, fever, chills or any other acute complaints.   She states that she has been ambulating without any problems.   Patient states that she is voiding without any complaints.     MEDICATIONS  (STANDING):  atorvastatin 20 milliGRAM(s) Oral at bedtime  ciprofloxacin   IVPB      ciprofloxacin   IVPB 400 milliGRAM(s) IV Intermittent every 12 hours  dextrose 5%. 1000 milliLiter(s) (100 mL/Hr) IV Continuous <Continuous>  dextrose 5%. 1000 milliLiter(s) (50 mL/Hr) IV Continuous <Continuous>  dextrose 50% Injectable 25 Gram(s) IV Push once  dextrose 50% Injectable 12.5 Gram(s) IV Push once  dextrose 50% Injectable 25 Gram(s) IV Push once  dorzolamide 2% Ophthalmic Solution 1 Drop(s) Both EYES three times a day  glucagon  Injectable 1 milliGRAM(s) IntraMuscular once  heparin   Injectable 5000 Unit(s) SubCutaneous every 8 hours  insulin lispro (ADMELOG) corrective regimen sliding scale   SubCutaneous three times a day before meals  lactated ringers. 1000 milliLiter(s) (125 mL/Hr) IV Continuous <Continuous>  latanoprost 0.005% Ophthalmic Solution 1 Drop(s) Both EYES at bedtime  metoprolol succinate ER 50 milliGRAM(s) Oral daily  metroNIDAZOLE  IVPB 500 milliGRAM(s) IV Intermittent every 8 hours  metroNIDAZOLE  IVPB      pancrelipase  (CREON 36,000 Lipase Units) 1 Capsule(s) Oral three times a day with meals  pregabalin 50 milliGRAM(s) Oral two times a day    MEDICATIONS  (PRN):  acetaminophen     Tablet .. 650 milliGRAM(s) Oral every 6 hours PRN Temp greater or equal to 38C (100.4F), Mild Pain (1 - 3)  dextrose Oral Gel 15 Gram(s) Oral once PRN Blood Glucose LESS THAN 70 milliGRAM(s)/deciliter  ondansetron Injectable 4 milliGRAM(s) IV Push every 6 hours PRN Nausea      Vital Signs Last 24 Hrs  T(C): 37.4 (10 Aug 2022 05:01), Max: 37.4 (10 Aug 2022 05:01)  T(F): 99.3 (10 Aug 2022 05:01), Max: 99.3 (10 Aug 2022 05:01)  HR: 92 (10 Aug 2022 05:01) (87 - 92)  BP: 130/64 (10 Aug 2022 05:01) (116/70 - 130/64)  BP(mean): 71 (09 Aug 2022 20:24) (71 - 71)  RR: 17 (10 Aug 2022 05:01) (17 - 18)  SpO2: 95% (10 Aug 2022 05:01) (94% - 95%)    Parameters below as of 10 Aug 2022 05:01  Patient On (Oxygen Delivery Method): room air        Physical:  General: A&Ox3. NAD.   Respiratory: non labored  Skin: warm and dry   Abdomen: Soft, mild distended, tender to LLQ/ LUQ, no rebound tenderness, no guarding, no palpable masses,   incisions healing well, dressing clean, dry and intact,   ROSALINA dressing changed  ROSALINA drain in place with serosanguineous output 245 ml/24 hr   : no cordova in place, voiding    Extremities:  no calf pain bilaterally    I&O's Detail    09 Aug 2022 07:01  -  10 Aug 2022 07:00  --------------------------------------------------------  IN:  Total IN: 0 mL    OUT:    Bulb (mL): 245 mL  Total OUT: 245 mL    Total NET: -245 mL          LABS:                        11.8   9.25  )-----------( 201      ( 09 Aug 2022 05:44 )             37.3             08-09    141  |  110<H>  |  16  ----------------------------<  114<H>  5.0   |  26  |  0.66    Ca    8.7      09 Aug 2022 05:44              
INTERVAL HPI/OVERNIGHT EVENTS:  No acute events overnight. Pt resting comfortably. No acute complaints.   Ambulates to bathroom independently.     MEDICATIONS  (STANDING):  atorvastatin 20 milliGRAM(s) Oral at bedtime  ciprofloxacin   IVPB      ciprofloxacin   IVPB 400 milliGRAM(s) IV Intermittent every 12 hours  dextrose 5%. 1000 milliLiter(s) (100 mL/Hr) IV Continuous <Continuous>  dextrose 5%. 1000 milliLiter(s) (50 mL/Hr) IV Continuous <Continuous>  dextrose 50% Injectable 25 Gram(s) IV Push once  dextrose 50% Injectable 12.5 Gram(s) IV Push once  dextrose 50% Injectable 25 Gram(s) IV Push once  dorzolamide 2% Ophthalmic Solution 1 Drop(s) Both EYES three times a day  glucagon  Injectable 1 milliGRAM(s) IntraMuscular once  heparin   Injectable 5000 Unit(s) SubCutaneous every 8 hours  insulin lispro (ADMELOG) corrective regimen sliding scale   SubCutaneous three times a day before meals  lactated ringers. 1000 milliLiter(s) (125 mL/Hr) IV Continuous <Continuous>  latanoprost 0.005% Ophthalmic Solution 1 Drop(s) Both EYES at bedtime  metoprolol succinate ER 50 milliGRAM(s) Oral daily  metroNIDAZOLE  IVPB      metroNIDAZOLE  IVPB 500 milliGRAM(s) IV Intermittent every 8 hours  pancrelipase  (CREON 36,000 Lipase Units) 1 Capsule(s) Oral three times a day with meals  pregabalin 50 milliGRAM(s) Oral two times a day    MEDICATIONS  (PRN):  acetaminophen     Tablet .. 650 milliGRAM(s) Oral every 6 hours PRN Temp greater or equal to 38C (100.4F), Mild Pain (1 - 3)  dextrose Oral Gel 15 Gram(s) Oral once PRN Blood Glucose LESS THAN 70 milliGRAM(s)/deciliter  ketorolac   Injectable 15 milliGRAM(s) IV Push every 6 hours PRN Moderate Pain (4 - 6)  ondansetron Injectable 4 milliGRAM(s) IV Push every 6 hours PRN Nausea  oxyCODONE    IR 5 milliGRAM(s) Oral four times a day PRN Severe Pain (7 - 10)      Vital Signs Last 24 Hrs  T(C): 37.1 (11 Aug 2022 05:23), Max: 37.1 (11 Aug 2022 05:23)  T(F): 98.7 (11 Aug 2022 05:23), Max: 98.7 (11 Aug 2022 05:23)  HR: 79 (11 Aug 2022 05:23) (78 - 79)  BP: 147/65 (11 Aug 2022 05:23) (116/67 - 147/65)  RR: 18 (11 Aug 2022 05:23) (18 - 18)  SpO2: 96% (11 Aug 2022 05:23) (94% - 96%)    Parameters below as of 11 Aug 2022 05:23  Patient On (Oxygen Delivery Method): room air    Physical:  General: Alert and oriented, not in acute distress  Respiratory: Breathing unlabored  Abdomen: Soft, nondistended, nontender; ROSALINA drain serous  : No cordova catheter, no dysuria or hematuria  Extremities: No pedal edema    I&O's Detail  10 Aug 2022 07:01  -  11 Aug 2022 07:00  --------------------------------------------------------  IN:  Total IN: 0 mL    OUT:    Bulb (mL): 160 mL  Total OUT: 160 mL  Total NET: -160 mL    LABS:                      11.5   13.18 )-----------( 213      ( 10 Aug 2022 14:20 )             36.1             08-10    142  |  108  |  5<L>  ----------------------------<  148<H>  4.1   |  28  |  0.62    Ca    9.0      10 Aug 2022 08:48  
INTERVAL HPI/OVERNIGHT EVENTS:    Pt seen and examined at bedside. Hypotensive overnight, s/p NS bolus.   Offers no acute complaints at this time, denies nausea or vomiting, tolerating clear diet well.     Vital Signs Last 24 Hrs  T(C): 36.7 (09 Aug 2022 06:23), Max: 36.7 (08 Aug 2022 20:56)  T(F): 98 (09 Aug 2022 06:23), Max: 98.1 (09 Aug 2022 00:58)  HR: 80 (09 Aug 2022 06:23) (68 - 85)  BP: 101/55 (09 Aug 2022 06:23) (86/48 - 138/88)  BP(mean): 56 (09 Aug 2022 00:58) (56 - 103)  RR: 18 (09 Aug 2022 06:23) (11 - 19)  SpO2: 100% (09 Aug 2022 06:23) (92% - 100%)    Parameters below as of 09 Aug 2022 06:23  Patient On (Oxygen Delivery Method): room air      I&O's Detail    08 Aug 2022 07:01  -  09 Aug 2022 07:00  --------------------------------------------------------  IN:    IV PiggyBack: 600 mL    Lactated Ringers: 875 mL    Sodium Chloride 0.9% Bolus: 1000 mL  Total IN: 2475 mL    OUT:    Bulb (mL): 205 mL    Indwelling Catheter - Urethral (mL): 3050 mL  Total OUT: 3255 mL    Total NET: -780 mL        ciprofloxacin   IVPB      ciprofloxacin   IVPB 400 milliGRAM(s) IV Intermittent every 12 hours  metroNIDAZOLE  IVPB 500 milliGRAM(s) IV Intermittent every 8 hours  metroNIDAZOLE  IVPB      pancrelipase  (CREON 36,000 Lipase Units) 1 Capsule(s) Oral three times a day with meals      Physical Exam:   GEN: AAOx3, No acute distress  ABD: Soft, ND, incisional TTP, dressing C/D/I, ROSALINA in place ss output   : Diallo in place, UO yellow and clear   Extremities: non edematous, no calf pain bilaterally      Labs:                        11.8   9.25  )-----------( 201      ( 09 Aug 2022 05:44 )             37.3     08-09    141  |  110<H>  |  16  ----------------------------<  114<H>  5.0   |  26  |  0.66    Ca    8.7      09 Aug 2022 05:44        
Surgery Post-Op Note    Pt seen and examined at bedside. Offers no acute complaints at this time. Pain well managed with medication. Denies fever, chills, SOB or CP.     ciprofloxacin   IVPB      metroNIDAZOLE  IVPB 500 milliGRAM(s) IV Intermittent every 8 hours  metroNIDAZOLE  IVPB          Vital Signs Last 24 Hrs  T(C): 36.3 (08 Aug 2022 14:38), Max: 36.8 (08 Aug 2022 06:49)  T(F): 97.3 (08 Aug 2022 14:38), Max: 98.2 (08 Aug 2022 06:49)  HR: 72 (08 Aug 2022 14:38) (68 - 76)  BP: 116/74 (08 Aug 2022 14:38) (104/71 - 138/88)  BP(mean): 89 (08 Aug 2022 13:30) (89 - 103)  RR: 17 (08 Aug 2022 14:38) (11 - 19)  SpO2: 92% (08 Aug 2022 14:38) (92% - 100%)    Parameters below as of 08 Aug 2022 14:38  Patient On (Oxygen Delivery Method): room air      Physical Exam:   GEN: AAOx3, No acute distress  ABD: Soft, ND, incisional TTP, dressing C/D/I, ROSALINA in place ss output   : Diallo in place, UO yellow and clear   Extremities: non edematous, no calf pain bilaterally        08-08 @ 07:01  -  08-08 @ 18:21  --------------------------------------------------------  IN: 0 mL / OUT: 485 mL / NET: -485 mL

## 2022-08-11 NOTE — PROGRESS NOTE ADULT - NS ATTEND AMEND GEN_ALL_CORE FT
Pt seen and examined. Reports feels much better today. Reports she received Toradol pain med last night which helped her sleep.  Tolerating diet. Passing flatus. Labs wnl, wbc back down to 9.  Abd- soft, Minimal incisional tenderness, mostly LLQ port site. Mild RLQ tenderness on deep palpation. No guarding no rebound. ROSALINA serosanguinous. Stable for discharge to home.
Pt seen and examined. Resting in bed comfortably. Reports abdominal pain when moving and sitting up, mostly on left side and suprapubic. Reports some Right sided pain as well but less than left.  Labs reviewed, + incr WBC to 13, Tmax 99.3.  + flatus. Tolerating diet, reports would like to eat different food. Has been OOB to void. Reports she feels ok to go home.  Abd soft- nondistended, + LLQ tenderness at 15 mm port site. Mild RLQ, suprapubic tenderness, no guarding no rebound. ROSALINA serosanguinous.   Repeat CBC, if WBC still the same can consider discharge

## 2022-08-11 NOTE — PROGRESS NOTE ADULT - ASSESSMENT
72y Female s/p laparoscopic interval appendectomy, KEITH, repair of serosal tear 8/8      -Pain control PRN  -C/w Home meds  -IVF  -Clear Diet  -ROSALINA drain care   -C/w Diallo catheter, remove in AM   -Incentive spirometer  -OOB/Ambulate  -DVT ppx 
72y Female s/p laparoscopic interval appendectomy, KEITH, repair of serosal tear 8/8, hypotensive overnight  H/H stable       -Pain control PRN, d/c narcotics   -C/w Home meds  -IVF  -Advance diet to regular   -ROSALINA drain care   -D/c Diallo catheter, TOV   -Incentive spirometer  -OOB/Ambulate  -DVT ppx   -Dc planning   
72F s/p interval appendectomy POD #3    - regular diet  - monitor BP  - monitor ROSALINA output   - ambulate, OOB as tolerated, DVT ppx, incentive spirometer   - pain meds, antiemetic, antipyretic PRN   - discussed with Dr. Vee   - d/c planning
72y.o. Female s/p interval appendectomy POD #2    - regular diet, advance as tolerated   - pending AM labs  - monitor BP  - monitor ROSALINA output   - ambulate, OOB as tolerated, DVT ppx, incentive spirometer   - pain meds, antiemetic, antipyretic PRN   - discussed and agreed with Dr. Vee

## 2022-08-11 NOTE — DISCHARGE NOTE PROVIDER - DISCHARGE DIET
Regular Diet - No restrictions Dupixent Counseling: I discussed with the patient the risks of dupilumab including but not limited to eye infection and irritation, cold sores, injection site reactions, worsening of asthma, allergic reactions and increased risk of parasitic infection.  Live vaccines should be avoided while taking dupilumab. Dupilumab will also interact with certain medications such as warfarin and cyclosporine. The patient understands that monitoring is required and they must alert us or the primary physician if symptoms of infection or other concerning signs are noted.

## 2022-08-11 NOTE — DISCHARGE NOTE NURSING/CASE MANAGEMENT/SOCIAL WORK - PATIENT PORTAL LINK FT
You can access the FollowMyHealth Patient Portal offered by [facility] by registering at the following website: [email or web address]. By joining [facility]’s FollowMyHealth portal, you will also be able to view your health information using other applications (apps) compatible with our system.

## 2022-08-11 NOTE — DISCHARGE NOTE PROVIDER - NSDCCPTREATMENT_GEN_ALL_CORE_FT
PRINCIPAL PROCEDURE  Procedure: Laparoscopic interval appendectomy  Findings and Treatment:       SECONDARY PROCEDURE  Procedure: Serosal tear repair  Findings and Treatment:     Procedure: Lysis of intestinal adhesions  Findings and Treatment:

## 2022-08-11 NOTE — DISCHARGE NOTE PROVIDER - NSDCCPCAREPLAN_GEN_ALL_CORE_FT
PRINCIPAL DISCHARGE DIAGNOSIS  Diagnosis: Chronic appendicitis  Assessment and Plan of Treatment: Please follow-up with your surgeon in 1 week. Drink plenty of fluids and rest as needed. Call for any fever over 101, nausea, vomiting, severe pain, no passing of gas or bowel movement.   DIET  You may resume your regular diet as normal.   SURGICAL SITES  Keep white steri-strips in place allowing them to fall off on their own. You may shower 48 hours post-operatively but do not bathe or soak in the water for 1-2 weeks; pat dry. If you notice any signs of surgical site infection (ie. redness, swelling, pain, pus drainage), please seek medical care immediately.   ACTIVITY  Do not lift anything heavier than 10 pounds for 2 weeks and avoid strenuous activity for 4-6 weeks.   PAIN CONTROL  You may take Motrin 600mg (with food) or Tylenol 650mg as needed for mild pain. Stagger one medication 3 hours after the other for maximum pain control. Maximum daily dose of Tylenol should not exceed 4grams/day.

## 2022-08-11 NOTE — DISCHARGE NOTE PROVIDER - NSDCACTIVITY_GEN_ALL_CORE
Showering allowed/Walking - Indoors allowed/No heavy lifting/straining/Walking - Outdoors allowed Showering allowed/Walking - Indoors allowed/No heavy lifting/straining/Walking - Outdoors allowed/Follow Instructions Provided by your Surgical Team

## 2022-08-11 NOTE — DISCHARGE NOTE PROVIDER - HOSPITAL COURSE
72 year old obese female with pmhx of bilateral cataract, cholecystitis, T2DM, hypertension, hyperlipidemia, vitamin b12 deficiency, Vitamin D deficiency, sciatica, seasonal allergies and rhinitis presents with c/o perforated appendicitis. Patient presented for interval Laparoscopic Appendectomy on 8/8. Patient underwent procedure with extensive Lysis of adhesions and after serosal tear. Admitted postoperatively for monitoring. ROSALINA left in place postoperatively. 72 year old obese female with pmhx of bilateral cataract, cholecystitis, T2DM, hypertension, hyperlipidemia, vitamin b12 deficiency, Vitamin D deficiency, sciatica, seasonal allergies and rhinitis presents with c/o perforated appendicitis. Patient presented for interval Laparoscopic Appendectomy on 8/8. Patient underwent procedure with extensive Lysis of adhesions and after serosal tear. Admitted postoperatively for monitoring. ROSALINA left in place postoperatively. Over the course, pt remained hemodynamically stable and diet advanced as bowel function returned. ROSALINA remained serous, removed in prior to discharge. Pt deemed safe for discharge home with follow up instructions. 72 year old obese female with pmhx of bilateral cataract, cholecystitis, T2DM, hypertension, hyperlipidemia, vitamin b12 deficiency, Vitamin D deficiency, sciatica, seasonal allergies and rhinitis presents with c/o perforated appendicitis at the end of May.  At that time she was treated with IR drainage and antibiotics. Patient presented for interval Laparoscopic Appendectomy on 8/8. Patient underwent procedure with extensive Lysis of adhesions and after serosal tear. Admitted postoperatively for monitoring. ROSALINA left in place postoperatively. Over the course, pt remained hemodynamically stable and diet advanced as bowel function returned. ROSALINA remained serous, removed in prior to discharge. Pt deemed safe for discharge home with follow up instructions.

## 2022-08-11 NOTE — DISCHARGE NOTE PROVIDER - NSDCMRMEDTOKEN_GEN_ALL_CORE_FT
atorvastatin 20 mg oral tablet: 1 tab(s) orally once a day  Calcium 600+D oral tablet: 1 tab(s) orally 2 times a day  Creon 36,000 units oral delayed release capsule: 1 cap(s) orally 3 times a day  dorzolamide 2% ophthalmic solution: 1 drop(s) to each affected eye 3 times a day  ergocalciferol 1.25 mg (50,000 intl units) oral capsule: 1 cap(s) orally once a week  Jardiance 25 mg oral tablet: 1 tab(s) orally once a day (in the morning)  latanoprost 0.005% ophthalmic solution: 1 drop(s) to each affected eye once a day (at bedtime)  metFORMIN 1000 mg oral tablet: 1 tab(s) orally 2 times a day  metoprolol succinate 50 mg oral tablet, extended release: 1 tab(s) orally once a day  Omega-3 Fish Oil 1000 mg oral capsule: 1 cap(s) orally once a day  pregabalin 50 mg oral capsule: 1 cap(s) orally 2 times a day  Vitamin B Complex 100: 1 tab(s) orally once a day (at bedtime)  Vitamin C 500 mg oral tablet: 1 tab(s) orally once a day   atorvastatin 20 mg oral tablet: 1 tab(s) orally once a day  Calcium 600+D oral tablet: 1 tab(s) orally 2 times a day  ciprofloxacin 500 mg oral tablet: 1 tab(s) orally 2 times a day   Creon 36,000 units oral delayed release capsule: 1 cap(s) orally 3 times a day  dorzolamide 2% ophthalmic solution: 1 drop(s) to each affected eye 3 times a day  ergocalciferol 1.25 mg (50,000 intl units) oral capsule: 1 cap(s) orally once a week  Jardiance 25 mg oral tablet: 1 tab(s) orally once a day (in the morning)  latanoprost 0.005% ophthalmic solution: 1 drop(s) to each affected eye once a day (at bedtime)  metFORMIN 1000 mg oral tablet: 1 tab(s) orally 2 times a day  metoprolol succinate 50 mg oral tablet, extended release: 1 tab(s) orally once a day  metroNIDAZOLE 500 mg oral tablet: 1 tab(s) orally 3 times a day   Omega-3 Fish Oil 1000 mg oral capsule: 1 cap(s) orally once a day  pregabalin 50 mg oral capsule: 1 cap(s) orally 2 times a day  traMADol 50 mg oral tablet: 25 milligram(s) orally every 8 hours MDD:3  take 25mg for moderate, 50mg for severe pain every 8 hours as needed  Vitamin B Complex 100: 1 tab(s) orally once a day (at bedtime)  Vitamin C 500 mg oral tablet: 1 tab(s) orally once a day

## 2022-08-11 NOTE — DISCHARGE NOTE PROVIDER - ATTENDING DISCHARGE PHYSICAL EXAMINATION:
Pt seen and examined. Afebrile, hemodynamically stable. Abd- soft, minimal incisional tenderness, mostly LLQ. No guarding, no rebound. ROSALINA serosanguinous. Labs WNL.   Tolerating diet. Reports adequate pain control. Stable for discharge to home.

## 2022-08-15 PROBLEM — J30.2 OTHER SEASONAL ALLERGIC RHINITIS: Chronic | Status: ACTIVE | Noted: 2022-08-03

## 2022-08-15 PROBLEM — Z86.69 PERSONAL HISTORY OF OTHER DISEASES OF THE NERVOUS SYSTEM AND SENSE ORGANS: Chronic | Status: ACTIVE | Noted: 2022-08-03

## 2022-08-15 PROBLEM — E53.8 DEFICIENCY OF OTHER SPECIFIED B GROUP VITAMINS: Chronic | Status: ACTIVE | Noted: 2022-08-03

## 2022-08-15 PROBLEM — E55.9 VITAMIN D DEFICIENCY, UNSPECIFIED: Chronic | Status: ACTIVE | Noted: 2022-08-03

## 2022-08-15 PROBLEM — H26.9 UNSPECIFIED CATARACT: Chronic | Status: ACTIVE | Noted: 2022-08-03

## 2022-08-15 PROBLEM — E66.9 OBESITY, UNSPECIFIED: Chronic | Status: ACTIVE | Noted: 2022-08-03

## 2022-08-15 PROBLEM — K81.9 CHOLECYSTITIS, UNSPECIFIED: Chronic | Status: ACTIVE | Noted: 2022-08-03

## 2022-08-17 ENCOUNTER — NON-APPOINTMENT (OUTPATIENT)
Age: 72
End: 2022-08-17

## 2022-08-24 ENCOUNTER — APPOINTMENT (OUTPATIENT)
Dept: SURGERY | Facility: CLINIC | Age: 72
End: 2022-08-24

## 2022-08-24 VITALS
SYSTOLIC BLOOD PRESSURE: 116 MMHG | WEIGHT: 134 LBS | DIASTOLIC BLOOD PRESSURE: 76 MMHG | HEART RATE: 78 BPM | BODY MASS INDEX: 25.3 KG/M2 | HEIGHT: 61 IN

## 2022-08-24 VITALS — TEMPERATURE: 97.1 F

## 2022-08-24 DIAGNOSIS — Z90.49 ACQUIRED ABSENCE OF OTHER SPECIFIED PARTS OF DIGESTIVE TRACT: ICD-10-CM

## 2022-08-24 PROCEDURE — 99024 POSTOP FOLLOW-UP VISIT: CPT

## 2022-08-25 NOTE — REASON FOR VISIT
[Post Op: _________] : a [unfilled] post op visit [FreeTextEntry1] : s/p Laparoscopic interval appendectomy with lysis of adhesions and laparoscopic repair of serosal tear on 08/08/2022

## 2022-08-25 NOTE — ASSESSMENT
[FreeTextEntry1] : CHITO PAZ is a 72 year old female who underwent a  Laparoscopic interval appendectomy with lysis of adhesions and laparoscopic repair of serosal tear on 08/08/2022 pathology results are consistent with Acute suppurative and hemorrhagic appendicitis with periappendicitis\par \par \par Patient is doing well. All surgical incisions are healing well and as expected. There is no evidence of infection or complication, and patient is progressing as expected. Post-operative wound care, activity, restrictions and precautions reinforced.  Pathology results were discussed in detail. Patient was instructed no heavy lifting 2 to 4 weeks. Patient's questions and concerns addressed to patient's satisfaction. \par

## 2022-08-25 NOTE — HISTORY OF PRESENT ILLNESS
[de-identified] : CHITO PAZ is a 72 year old female who presents in the office for postop visit. She underwent a Laparoscopic interval appendectomy with lysis of adhesions and laparoscopic repair of serosal tear on 08/08/2022 pathology results are consistent with Acute suppurative and hemorrhagic appendicitis with periappendicitis.  Today patient is doing well, offers no complaints. Denies any fevers, chills, nausea, vomiting, diarrhea or constipation. Patient able to tolerate regular diet with normal bowel movements. Surgical incisions are healing well. No sign of inflammation or exudate.

## 2022-08-25 NOTE — PHYSICAL EXAM
[Normal Breath Sounds] : Normal breath sounds [Normal Heart Sounds] : normal heart sounds [Normal Rate and Rhythm] : normal rate and rhythm [Alert] : alert [Oriented to Person] : oriented to person [Oriented to Place] : oriented to place [Oriented to Time] : oriented to time [Calm] : calm [de-identified] : The patient is alert, well-groomed  [de-identified] : Normoactive bowel sounds, soft and nontender, no hepatosplenomegaly or masses noted, Patient incision sites are healing well, no signs of infection. No trocar site hernia [de-identified] : full range of motion and no deformities appreciated.

## 2022-08-25 NOTE — CONSULT LETTER
[Dear  ___] : Dear  [unfilled], [Courtesy Letter:] : I had the pleasure of seeing your patient, [unfilled], in my office today. [Consult Closing:] : Thank you very much for allowing me to participate in the care of this patient.  If you have any questions, please do not hesitate to contact me. [Sincerely,] : Sincerely, [FreeTextEntry1] : She underwent interval appendectomy on 8/08/22. She was admitted 3 months ago for perforated appendicitis and IR drainage of abscess. She is doing well, healing up as expected. Pathology revealed suppurative appendicitis with no other findings.  [FreeTextEntry3] : Dr. Vee

## 2022-12-26 NOTE — H&P PST ADULT - NEGATIVE CARDIOVASCULAR SYMPTOMS
Kevin Quick in pharmacy, ok to give rhogam IV  
Pt presents to ED with c/o vaginal bleeding that started yesterday. Pt states that yesterday bleeding was pink but today is bright red with clots. Pt states that she is pregnant but unsure how many weeks. Pt missed period first week of December. Pt denies any abdominal pain. VSS.   
no chest pain/no palpitations/no paroxysmal nocturnal dyspnea/no peripheral edema/no claudication

## 2023-08-10 NOTE — ED PROVIDER NOTE - NS ED MD DISPO SPECIAL CONSIDERATION1
PEDIATRIC ENDOCRINOLOGY CLINIC Visit  Consulting Provider: Genesis Azul MD  Reason for Visit:   Chief Complaint   Patient presents with   • Office Visit     With mom and dad   • Thyroid Problem     1. Explanation why results were different his last lab draw compared to 8/8/23 lab draw? 2.not growing compared to kids his own age       History obtained from mother, chart review.    Today's visit was performed with the assistance of  Services.   Name of :    Service used: Video: Onavo          Impression:  Omar Stevenson is a 1 year old 7 month old old male with Down syndrome who is being seen today by pediatric endocrinology for ongoing evaluation and management of congenital hypothyroidism.  Levothyroxine was initiated at 6 weeks of age, following persistently elevated TSH levels.  He clinically is euthyroid, aside from chronic eczema which is currently under very good control, including normal growth and development considering his genetic condition.  Current thyroid function tests demonstrate a persistently but minimally elevated free T4, and a TSH well within normal range, suggesting appropriate thyroid hormone replacement.  Adherence to giving recommended medication regimen remains excellent.     Recommendations:    - Continue  levothyroxine 12.5 mcg (1/2 tablet) daily  - Repeat the TSH and free T4 in another 4-5 months, sooner if needed for any concerning symptoms  - Reviewed Edward's growth pattern compared to other boys with Down syndrome (his is normal, appropriate, expected) versus boys without Down syndrome (shorter), but that shorter stature is expected given his Down syndrome  - Reviewed weight gain pattern.  Due to increased risk for celiac and other autoimmune conditions, will obtain repeat celiac screen with next blood draw  - Reviewed that Omar will like be a candidate to safely do a trial off levothyroxine once he turns 2,  though that even if he is able to discontinue levothyroxine at that time, his risk to develop thyroid disease, including autoimmune hypothyroidism, remains increased  - continue to closely monitor growth pattern and, when older, for puberty development  - Return to clinic in 5-6 months, sooner as needed for any clinical concerns     Plan of care, including education on the safe and effective use of medication(s) and/or medical equipment if prescribed, was discussed with the patient/family. Patient/family verbalized understanding and agreed with the treatment options discussed.      History:   Oamr Stevenson is a  1 year old 7 month old male who presents to pediatric endocrinology today  for ongoing evaluation and management of  congenital hypothyroidism.      Omar was born late pre-term, at 36-3/7 weeks gestation.  He was born via  secondary to concern for developing  Hydrops, as abdominal ascites, hydroceles, and forehead edema was seen on ultrasound on the day of delivery.  Pregnancy was notable for advanced maternal age, gestational hypertension treated with oral medication, polyhydramnios, an abnormal quad screen (positive for trisomy 21) with a normal anatomy scan, and maternal positive COVID -19 test on , with resolution of symptoms prior to delivery.  Omar was appropriate for gestational age, with birth weight 6 lb 0 oz (2.7 kg; 41% Kaz Premature growth curve), birth length 19 in (48 cm; 55%), and head circumference 12.8 in (32.5 cm; 38%).  Apgars were 2 and 7 at one and five minutes, respectively.  He required suctioning and PPV for respiratory support, due to distress and apnea, and he was subsequently started on CPAP and admitted to the NICU.  He was weaned from CPAP to nasal cannula on DOL 2 and weaned to room air on DOL 5.   He had an echocardiogram, which showed a structurally normal heart with evidence of PPHN.  No further cardiology follow-up with recommended.  On   exam, he was noted to have dysmorphic features consistent with Down Syndrome, and chromosome testing ultimately showed 47,XY +21 chromosomes.   There was no known  jaundice requiring phototherapy or hypoglycemia.  He was circumcised and tolerated this without difficulty.  He was discharged from the NICU on 2021, at 17 days old.    Omar's initial  screen, drawn on DOL 2, was inconclusive.  Repeat  screen at age 1 week and 2 week were both normal.  Secondary to having Down syndrome and increased risk for hypothyroidism, thyroid function tests were obtained on DOL 3, which revealed an elevated TSH and elevated free T4 at that time.  He was clinically asymptomatic, and continued clinical monitoring was recommended. Following persistently elevated TSH levels, levothyroxine was initiated at age 6 weeks.  He has had routine monitoring of thyroid function tests and medication dose adjustments as needed since then.    Omar is currently taking levothyroxine 12.5 mcg daily.  Parents are responsible for administering medication, which is given in mid-afternoon, around 3:00 pm.  Crushed and mixed with 1 tsp yogurt or applesauce.      Growth chart (Down syndrome 0-36 months) shows interval weight gain from 62% to 42% today, and interval length growth from 25% to 35%.       - No constipation, fatigue, tiredness, hair loss, slow in developmental progress, unexplained irritability, or other concerns    - Continued eczema, no significant changes.      - No other symptoms or concerns.         Omar continues to progress on meeting developmental milestones, continues to be involved with physical, occupational and speech therapy.   High MCHAT score at recent 1 yo well child check, so referred for further evaluation, particularly for Autism.       His mother is 5 ft 5 in and his father is 5 ft 11 in, giving a projected midparental height of 70.5 ± 3 in.  There is a family history of thyroid disease  in paternal grandmother, though further details are not known.          Problem List:  Patient Active Problem List   Diagnosis   • Trisomy 21 by FISH   • Laryngomalacia   • Congenital hypothyroidism   • Eczema   • Developmental delay   • Feeding difficulty in child   • Mixed receptive-expressive language disorder     Past Medical/Surgical/Family/Social History:  I have reviewed, verified, and personally updated the past medical, surgical, family, and social history.     Allergies:  ALLERGIES:  Patient has no known allergies.    Medications:  Current Outpatient Medications   Medication Sig Dispense Refill   • levothyroxine 25 MCG tablet Take 1/2 tablet by mouth daily. (total dose 12.5 mcg daily) 90 tablet 1   • levothyroxine 25 MCG tablet GIVE \"EDWARD\" 1/2 TABLET BY MOUTH DAILY. TOTAL DAILY DOSE IS 12.5 MCG AS OF 7/12/22: 15 tablet 1   • fluticasone propionate (FLOVENT HFA) 44 MCG/ACT inhaler Inhale 2 puffs into the lungs.     • albuterol 108 (90 Base) MCG/ACT inhaler Inhale 2-4 puffs into the lungs every 4 hours as needed for Shortness of Breath or Wheezing. 8.5 g 0   • triamcinolone (ARISTOCORT) 0.1 % ointment APPLY TOPICALLY TO THE AFFECTED AREA TWICE DAILY AS NEEDED     • cetirizine (ZYRTEC) 1 MG/ML solution TAKE 1.3 MLS BY MOUTH DAILY 39 mL 0   • pediatric multivitamin with iron (POLY-VI-SOL WITH IRON) 11 MG/ML Solution Take 1 mL by mouth daily. Do not start before January 8, 2021. 50 mL 0     No current facility-administered medications for this visit.        Review of Systems:  Ten systems were reviewed and were otherwise negative except as per HPI.    Physical Exam  Pulse 92, height 2' 9\" (0.838 m), weight 11.9 kg (26 lb 5.2 oz).  Body mass index is 16.99 kg/m².   Body surface area is 0.51 meters squared.  42 %ile (Z= -0.20) based on Down Syndrome (Boys, 0-36 Months) weight-for-age data using vitals from 8/10/2023.   35 %ile (Z= -0.38) based on Down Syndrome (Boys, 0-36 Months) Stature-for-age data based on  Stature recorded on 8/10/2023.   73 %ile (Z= 0.61) based on CDC (Boys, 2-20 Years) BMI-for-age based on BMI available as of 8/10/2023.  No blood pressure reading on file for this encounter.    Growth parameters and vital signs reviewed and are: normal.    GENERAL:  Well appearing infant, nontoxic, no acute distress, playful, smiling, interactive with examiner.   Dysmorphic features consistent with Down syndrome.  SKIN: Warm, normal turgor.  No cyanosis.  No bruises or lesions. No pallor, or rashes. No eczema patches appreciated today.   Hair normal texture and distribution.   HEAD:  Normocephalic, atraumatic.    Flattened facial profile.  EYES:  Conjunctivae appear normal.  Pupils equal, round, reactive to light, EOMI. Upslanting palpebral fissures. +Epicanthal folds.  NOSE:  Small nose, flat nasal bridge, no flaring.  EARS:  Small size, normal shape pinnae, no preauricular skin tags or pit.   THROAT:  Oropharynx with moist mucous membranes and no lesions.  Intact palate.  Age-appropriate dentition.  NECK:  Supple, no lymphadenopathy or masses. Thyroid non-palpable.  Short neck.  HEART:  Regular rate and rhythm.  Quiet precordium.  Normal S1, S2.  No murmurs, rubs, gallops.  No edema.  LUNGS:  Clear to auscultation bilaterally.  No wheezes, rales, rhonchi.  Normal work of breathing.  ABDOMEN:   Soft, non-tender, round.  No organomegaly or masses.    EXTREMITIES:  Warm, dry, without abnormalities. No gross abnormalities.  Moves bilateral upper and lower extremities equally.  +horizontal palmar crease  NEUROLOGIC:  CN II-XII intact grossly.  Generalized low muscle tone.   GENITOURINARY:  Deferred (2/09/23 exam:  Male: normally formed, small appearing penis and testes in the scrotal sac bilaterally)  Pubertal Status:  Deferred (2/09/23 exam: Braden:  Testicular Volume Left: 1 mL, Testicular Volume Right: 1 mL and Pubic Hair: 1 )  Acne:  none  Axillary hair: none      Laboratory:   Latest Reference Range & Units  02/08/23 14:45 07/14/23 15:05 08/08/23 16:12   T4, FREE 0.8 - 1.4 ng/dL 1.9 (H) 1.9 (H) 1.6 (H)   TSH 0.662 - 4.010 mcUnits/mL 1.618 6.381 (H) 1.907   Labs typically drawn just before gets levothyroxine dose     Imaging:  none      Time Spent:   I spent a total of 35 minutes today for Omar's appointment, including pre-visit prep and charting, face to face counseling/ direct management/discussion/coordination of Edward's care, and post-visit interpretation of labs, imaging, and documentation.   Please review my clinic note regarding what was discussed during this visit.       Genesis Azul MD   None

## 2024-10-04 NOTE — CONSULT NOTE ADULT - ASSESSMENT
Requested Prescriptions     Pending Prescriptions Disp Refills    MOUNJARO 7.5 MG/0.5ML Subcutaneous Solution Pen-injector 2 mL 0     Sig: Inject 7.5 mg into the skin once a week.          LOV: 09/10/24 (tele)  RTC:   Last Relevant Labs:   Filled: 09/08/24 #2ml with 0 refills    Future Appointments   Date Time Provider Department Center   10/10/2024  9:00 AM Brittanie Preciado MD [facility]   11/12/2024  1:30 PM Aishwarya Alas PA [facility]   12/30/2024  7:20 AM Joan Leigh PA-C [id number] [address]   12/30/2024  9:00 AM Kwabena Strauss DPM [id number]        Please increase  
71 year old female with right lower quadrant pain found to have a fluid collection    -Initial non con CT was concerning for perforated appendicitis. Prior CT from 2018 also raised this concern as well as cecal mass and colonoscopy was recommended at the time  -Spoke with Dr Vee of general surgery. I requested repeat CT with IV contrast as it was difficult to determine what was collection and what was cecal wall throughout.    -Repeat CT obtained and reviewed. Two separate air/fluid filled collections noted. Neither of these collections appear related to the appendix but to the cecal wall mass, concerning for a contained perforation. The lower portion of the collection contains stool.     -I spoke with Dr Vee again to discuss how to proceed going forward given more complicated picture than initially thought. As per Dr Vee, patient is a poor historian and she is awaiting GI results from prior colonoscopy, although it is unclear whether this colonoscopy occurred after CT in 2018. Decision was made with Dr Vee to hold off on drainage at this time as patient require additional interventions other than just percutaneous drainage. Given that patient is currently stable, there is no urgent need for drainage at this time, but IR is available if drainage is determined to be the most appropriate next step.    -I discussed this with the patient as well who was brought to IR after CT scan was obtained. Patient stated she understood diagnostic dilemma and was open to drainage in the future if that was indicated.  
Acute Appendicitis with perforation  Intraabdominal abscess  Leukocytosis - normalized    Plan - Cont Zosyn 3.375 gms iv q8hrs  For possible IR drainage of abscess tomorrow  will likely need a Midline or PICC line to get IV antibiotics at home for approx 3 weeks.

## 2025-01-04 ENCOUNTER — INPATIENT (INPATIENT)
Facility: HOSPITAL | Age: 75
LOS: 3 days | Discharge: HOME | DRG: 445 | End: 2025-01-08
Attending: STUDENT IN AN ORGANIZED HEALTH CARE EDUCATION/TRAINING PROGRAM | Admitting: STUDENT IN AN ORGANIZED HEALTH CARE EDUCATION/TRAINING PROGRAM
Payer: MEDICARE

## 2025-01-04 VITALS
TEMPERATURE: 98 F | DIASTOLIC BLOOD PRESSURE: 81 MMHG | SYSTOLIC BLOOD PRESSURE: 173 MMHG | OXYGEN SATURATION: 96 % | HEART RATE: 70 BPM | WEIGHT: 140.43 LBS | HEIGHT: 59.84 IN | RESPIRATION RATE: 18 BRPM

## 2025-01-04 DIAGNOSIS — Z90.49 ACQUIRED ABSENCE OF OTHER SPECIFIED PARTS OF DIGESTIVE TRACT: Chronic | ICD-10-CM

## 2025-01-04 DIAGNOSIS — R10.9 UNSPECIFIED ABDOMINAL PAIN: ICD-10-CM

## 2025-01-04 DIAGNOSIS — Z98.41 CATARACT EXTRACTION STATUS, RIGHT EYE: Chronic | ICD-10-CM

## 2025-01-04 LAB
ALBUMIN SERPL ELPH-MCNC: 3.7 G/DL — SIGNIFICANT CHANGE UP (ref 3.5–5)
ALP SERPL-CCNC: 46 U/L — SIGNIFICANT CHANGE UP (ref 40–120)
ALT FLD-CCNC: 18 U/L DA — SIGNIFICANT CHANGE UP (ref 10–60)
ANION GAP SERPL CALC-SCNC: 9 MMOL/L — SIGNIFICANT CHANGE UP (ref 5–17)
APPEARANCE UR: CLEAR — SIGNIFICANT CHANGE UP
AST SERPL-CCNC: 29 U/L — SIGNIFICANT CHANGE UP (ref 10–40)
BASOPHILS # BLD AUTO: 0.03 K/UL — SIGNIFICANT CHANGE UP (ref 0–0.2)
BASOPHILS NFR BLD AUTO: 0.5 % — SIGNIFICANT CHANGE UP (ref 0–2)
BILIRUB SERPL-MCNC: 0.6 MG/DL — SIGNIFICANT CHANGE UP (ref 0.2–1.2)
BILIRUB UR-MCNC: NEGATIVE — SIGNIFICANT CHANGE UP
BUN SERPL-MCNC: 17 MG/DL — SIGNIFICANT CHANGE UP (ref 7–18)
CALCIUM SERPL-MCNC: 9.5 MG/DL — SIGNIFICANT CHANGE UP (ref 8.4–10.5)
CHLORIDE SERPL-SCNC: 105 MMOL/L — SIGNIFICANT CHANGE UP (ref 96–108)
CO2 SERPL-SCNC: 23 MMOL/L — SIGNIFICANT CHANGE UP (ref 22–31)
COLOR SPEC: YELLOW — SIGNIFICANT CHANGE UP
CREAT SERPL-MCNC: 0.77 MG/DL — SIGNIFICANT CHANGE UP (ref 0.5–1.3)
DIFF PNL FLD: NEGATIVE — SIGNIFICANT CHANGE UP
EGFR: 80 ML/MIN/1.73M2 — SIGNIFICANT CHANGE UP
EOSINOPHIL # BLD AUTO: 0.09 K/UL — SIGNIFICANT CHANGE UP (ref 0–0.5)
EOSINOPHIL NFR BLD AUTO: 1.4 % — SIGNIFICANT CHANGE UP (ref 0–6)
GLUCOSE SERPL-MCNC: 106 MG/DL — HIGH (ref 70–99)
GLUCOSE UR QL: >=1000 MG/DL
HCT VFR BLD CALC: 41.7 % — SIGNIFICANT CHANGE UP (ref 34.5–45)
HGB BLD-MCNC: 13.8 G/DL — SIGNIFICANT CHANGE UP (ref 11.5–15.5)
IMM GRANULOCYTES NFR BLD AUTO: 0.5 % — SIGNIFICANT CHANGE UP (ref 0–0.9)
KETONES UR-MCNC: 15 MG/DL
LEUKOCYTE ESTERASE UR-ACNC: NEGATIVE — SIGNIFICANT CHANGE UP
LIDOCAIN IGE QN: 23 U/L — SIGNIFICANT CHANGE UP (ref 13–75)
LYMPHOCYTES # BLD AUTO: 1.71 K/UL — SIGNIFICANT CHANGE UP (ref 1–3.3)
LYMPHOCYTES # BLD AUTO: 27.4 % — SIGNIFICANT CHANGE UP (ref 13–44)
MCHC RBC-ENTMCNC: 29.2 PG — SIGNIFICANT CHANGE UP (ref 27–34)
MCHC RBC-ENTMCNC: 33.1 G/DL — SIGNIFICANT CHANGE UP (ref 32–36)
MCV RBC AUTO: 88.2 FL — SIGNIFICANT CHANGE UP (ref 80–100)
MONOCYTES # BLD AUTO: 0.43 K/UL — SIGNIFICANT CHANGE UP (ref 0–0.9)
MONOCYTES NFR BLD AUTO: 6.9 % — SIGNIFICANT CHANGE UP (ref 2–14)
NEUTROPHILS # BLD AUTO: 3.95 K/UL — SIGNIFICANT CHANGE UP (ref 1.8–7.4)
NEUTROPHILS NFR BLD AUTO: 63.3 % — SIGNIFICANT CHANGE UP (ref 43–77)
NITRITE UR-MCNC: NEGATIVE — SIGNIFICANT CHANGE UP
NRBC # BLD: 0 /100 WBCS — SIGNIFICANT CHANGE UP (ref 0–0)
PH UR: 5 — SIGNIFICANT CHANGE UP (ref 5–8)
PLATELET # BLD AUTO: 215 K/UL — SIGNIFICANT CHANGE UP (ref 150–400)
POTASSIUM SERPL-MCNC: 4.9 MMOL/L — SIGNIFICANT CHANGE UP (ref 3.5–5.3)
POTASSIUM SERPL-SCNC: 4.9 MMOL/L — SIGNIFICANT CHANGE UP (ref 3.5–5.3)
PROT SERPL-MCNC: 7.5 G/DL — SIGNIFICANT CHANGE UP (ref 6–8.3)
PROT UR-MCNC: NEGATIVE MG/DL — SIGNIFICANT CHANGE UP
RBC # BLD: 4.73 M/UL — SIGNIFICANT CHANGE UP (ref 3.8–5.2)
RBC # FLD: 15.4 % — HIGH (ref 10.3–14.5)
SODIUM SERPL-SCNC: 137 MMOL/L — SIGNIFICANT CHANGE UP (ref 135–145)
SP GR SPEC: 1.08 — HIGH (ref 1–1.03)
TROPONIN I, HIGH SENSITIVITY RESULT: 5.3 NG/L — SIGNIFICANT CHANGE UP
UROBILINOGEN FLD QL: 0.2 MG/DL — SIGNIFICANT CHANGE UP (ref 0.2–1)
WBC # BLD: 6.24 K/UL — SIGNIFICANT CHANGE UP (ref 3.8–10.5)
WBC # FLD AUTO: 6.24 K/UL — SIGNIFICANT CHANGE UP (ref 3.8–10.5)

## 2025-01-04 PROCEDURE — 99223 1ST HOSP IP/OBS HIGH 75: CPT | Mod: GC

## 2025-01-04 PROCEDURE — 99285 EMERGENCY DEPT VISIT HI MDM: CPT

## 2025-01-04 PROCEDURE — 74177 CT ABD & PELVIS W/CONTRAST: CPT | Mod: 26,MC

## 2025-01-04 PROCEDURE — 76705 ECHO EXAM OF ABDOMEN: CPT | Mod: 26

## 2025-01-04 RX ORDER — MORPHINE SULFATE 15 MG
4 TABLET, EXTENDED RELEASE ORAL EVERY 4 HOURS
Refills: 0 | Status: DISCONTINUED | OUTPATIENT
Start: 2025-01-04 | End: 2025-01-07

## 2025-01-04 RX ORDER — SODIUM CHLORIDE 9 MG/ML
1000 INJECTION, SOLUTION INTRAMUSCULAR; INTRAVENOUS; SUBCUTANEOUS ONCE
Refills: 0 | Status: COMPLETED | OUTPATIENT
Start: 2025-01-04 | End: 2025-01-04

## 2025-01-04 RX ORDER — MORPHINE SULFATE 15 MG
2 TABLET, EXTENDED RELEASE ORAL EVERY 4 HOURS
Refills: 0 | Status: DISCONTINUED | OUTPATIENT
Start: 2025-01-04 | End: 2025-01-07

## 2025-01-04 RX ORDER — KETOROLAC TROMETHAMINE 30 MG/ML
15 INJECTION INTRAMUSCULAR; INTRAVENOUS ONCE
Refills: 0 | Status: DISCONTINUED | OUTPATIENT
Start: 2025-01-04 | End: 2025-01-04

## 2025-01-04 RX ORDER — MORPHINE SULFATE 15 MG
4 TABLET, EXTENDED RELEASE ORAL ONCE
Refills: 0 | Status: DISCONTINUED | OUTPATIENT
Start: 2025-01-04 | End: 2025-01-04

## 2025-01-04 RX ORDER — POLYETHYLENE GLYCOL 3350 17 G/DOSE
17 POWDER (GRAM) ORAL DAILY
Refills: 0 | Status: DISCONTINUED | OUTPATIENT
Start: 2025-01-04 | End: 2025-01-08

## 2025-01-04 RX ORDER — NALOXONE HCL 0.4 MG/ML
0.4 VIAL (ML) INJECTION ONCE
Refills: 0 | Status: DISCONTINUED | OUTPATIENT
Start: 2025-01-04 | End: 2025-01-08

## 2025-01-04 RX ORDER — BISACODYL 5 MG
5 TABLET, DELAYED RELEASE (ENTERIC COATED) ORAL DAILY
Refills: 0 | Status: DISCONTINUED | OUTPATIENT
Start: 2025-01-04 | End: 2025-01-08

## 2025-01-04 RX ORDER — SENNOSIDES 8.6 MG/1
2 TABLET, FILM COATED ORAL AT BEDTIME
Refills: 0 | Status: DISCONTINUED | OUTPATIENT
Start: 2025-01-04 | End: 2025-01-08

## 2025-01-04 RX ORDER — INSULIN LISPRO 100/ML
VIAL (ML) SUBCUTANEOUS AT BEDTIME
Refills: 0 | Status: DISCONTINUED | OUTPATIENT
Start: 2025-01-04 | End: 2025-01-08

## 2025-01-04 RX ORDER — ACETAMINOPHEN 80 MG/.8ML
650 SOLUTION/ DROPS ORAL EVERY 6 HOURS
Refills: 0 | Status: DISCONTINUED | OUTPATIENT
Start: 2025-01-04 | End: 2025-01-08

## 2025-01-04 RX ORDER — ONDANSETRON 4 MG/1
4 TABLET ORAL EVERY 8 HOURS
Refills: 0 | Status: DISCONTINUED | OUTPATIENT
Start: 2025-01-04 | End: 2025-01-08

## 2025-01-04 RX ORDER — INSULIN LISPRO 100/ML
VIAL (ML) SUBCUTANEOUS
Refills: 0 | Status: DISCONTINUED | OUTPATIENT
Start: 2025-01-04 | End: 2025-01-08

## 2025-01-04 RX ADMIN — Medication 4 MILLIGRAM(S): at 20:45

## 2025-01-04 RX ADMIN — SODIUM CHLORIDE 1000 MILLILITER(S): 9 INJECTION, SOLUTION INTRAMUSCULAR; INTRAVENOUS; SUBCUTANEOUS at 13:51

## 2025-01-04 RX ADMIN — Medication 4 MILLIGRAM(S): at 13:36

## 2025-01-04 RX ADMIN — KETOROLAC TROMETHAMINE 15 MILLIGRAM(S): 30 INJECTION INTRAMUSCULAR; INTRAVENOUS at 16:55

## 2025-01-04 RX ADMIN — Medication 4 MILLIGRAM(S): at 19:06

## 2025-01-04 RX ADMIN — KETOROLAC TROMETHAMINE 15 MILLIGRAM(S): 30 INJECTION INTRAMUSCULAR; INTRAVENOUS at 20:45

## 2025-01-04 NOTE — H&P ADULT - PROBLEM SELECTOR PLAN 1
p/w 2 weeks of RUQ and R flank pain, initially waxing and waning, now constant for the last 2 days  -hx of cholecystitis s/p cholecystectomy and appendicitis s/p appendectomy  -CT A/P shows Dilated CBD demonstrating interval progression in comparison to the prior study (2022). "It may be related to prior cholecystectomy, however, in view of clinical history, consider MRCP  for further evaluation". 3 cm sized cyst head of the pancreas, stable  -Hepatic function panel WNL  -f/u MRCP  -GI consult in AM  -pain control: tylenol 650mg PO q6 PRN (mild), morphine 2mg/4mg IV q4 PRN (moderate/severe)  -per prior surgery notes indicate patient required lysis of adhesions in 2022-->CT shows no bowel obstruction however no PO contrast-->consider surgery consult if MRCP unremarkable as current pain possibly related to adhesions?

## 2025-01-04 NOTE — H&P ADULT - ASSESSMENT
74y F with PMH of HTN, DM, HLD, Cholecystis s/p cholecystectomy,  perforated appendicitis s/p IR drainage of abscess and interval appendectomy (8/2022) presenting with right sided abdominal and flank pain. Admitted to medicine for intractable RUQ pain with evidence of dilated CBD on CT imaging. Admitted for pain control and likely MRCP for further evaluation of dilated CBD.      PRIMARY TEAM PLEASE CONFIRM MED REC in AM. Patient stated no change in meds since previous admission however multiple new prescriptions per [employer].

## 2025-01-04 NOTE — H&P ADULT - NSICDXPASTMEDICALHX_GEN_ALL_CORE_FT
PAST MEDICAL HISTORY:  Appendicitis     Cataract     Cholecystitis     Diabetes     Essential hypertension     History of sciatica     Hyperlipidemia     Obesity     Seasonal allergies     Seasonal rhinitis     Vitamin B12 deficiency     Vitamin D deficiency

## 2025-01-04 NOTE — H&P ADULT - PROBLEM SELECTOR PLAN 2
h/o HTN on metoprolol ER 50mg qd at home  -c/w home meds   -monitor BP  -adjust antihypertensive meds as appropriate

## 2025-01-04 NOTE — ED ADULT TRIAGE NOTE - CHIEF COMPLAINT QUOTE
PT REPORTS RIGHT FLANK PAIN RADIATING TO RUQ ABDOMEN OFF AND ON X 2 WEEKS. + NAUSEA. DENIES HEMATURIA OR DYSURIA

## 2025-01-04 NOTE — ED ADULT TRIAGE NOTE - TEMPERATURE IN FAHRENHEIT (DEGREES F)
Patient needs a medicine sent in. Prescribed at his visit on Monday 8/16 with Dr Aggarwal. He thinks it is losartan/HCTZ  
RX sent.   
97.5

## 2025-01-04 NOTE — H&P ADULT - ATTENDING COMMENTS
IMAGING  CT A/P with IV Contrast  BILE DUCTS: Dilated CBD measuring 1.2 cm, increased in comparison to the prior study. It may be related to prior cholecystectomy, however, in view of clinical history, consider MRCP for further evaluation.  GALLBLADDER: Cholecystectomy.  PANCREAS: 1.3 cm sized cyst head of the pancreas, stable.  ADRENALS: There are 2 left adrenal nodules measuring up to 1.4 cm, stable.  IMPRESSION:  Dilated CBD demonstrating interval progression in comparison to the prior study as described above.  Additional findings as above.    RUQ Abdominal U/S  IMPRESSION:  Hepatic steatosis.    HPI  74 year old female patient with pmhx DM, HLD, HTN, bilateral cataracts, Vitamin B12 deficiency, Vitamin D deficiency, Sciatica, Cholecystectomy who presented to the ER due to RUQ and right flank pain radiating to her back for the past 2 weeks with nausea.  The patient states the pain is constant and waxing and waning in intensity being 3/10 when least painful and 10/10 when most painful. It feels like a similar pain to when she had the gallbladder pain for which she had a cholecystectomy. The pain is worse with movement and lying down. Eating food does not affect the pain, but her appetite has been less for the past 2 weeks. Nothing makes the pain better. She denies ever having this pain in the past since her cholecystectomy. She has not seen GI outpatient for her symptoms.    Review Of Systems included: + RUQ/Right Flank pain, + nausea, + back pain, + nausea, + loss of appetite, + constipation (last BM yesterday),   No vomiting, no heartburn, no diarrhea, no hard stool/straining, no recent lifting of heavy objects or exercises, no chest pain, no shortness of breath, no fever, no chills, no dysuria, no hematuria, no worsening of pain with eating    Physical Exam  General: Awake, Alert, Oriented  Cardiac: RRR  Pulmonary: CTA b/l  Abdominal: Soft, ND, + RUQ pain, + Right flank pain on palpation, + Right CVA tenderness  Extremities: No edema b/l    A/P  # Severe Intractable RUQ Abdominal Pain  # Dilated CBD  > Differential Diagnosis includes but not limited to: musculoskeletal pain, constipation. CBD stone less likely given lack of LFT elevation  > CBD 1.2 cm. LFTs within normal limits. Discussed with ER; patient stable to wait until Monday if GI evaluation is needed  > 8/10 pain severity in the ER  - Consult GI  - Consult Pain Medicine  - Zofran prn  - Tylenol prn for mild pain, IV Morphine 2mg prn for moderate pain, IV Morphine 4mg prn for severe pain  - Senna, Miralax    # Pancreatic Cyst  # Hepatic Steatosis  # Adrenal Nodules  > CT A/P report in the ER includes '1.3 cm sized cyst head of the pancreas, stable. There are 2 left adrenal nodules measuring up to 1.4 cm, stable'. Prior CT A/P from 5/23/22 report includes '1.8 x 1.1 cm pancreatic head cyst. Stable nodular thickening left adrenal gland. Normal right adrenal gland'  - Outpatient follow up    # Hx of DM  - Insulin Sliding Scale  - Blood Glucose Monitoring  - Can monitor blood glucose for 24 hours before starting long acting insulin if needed    # Hx of HLD, HTN, bilateral cataracts, Vitamin B12 deficiency, Vitamin D deficiency, Sciatica, Cholecystectomy  - Continue home medications after med-rec    # DVT PPx  - Lovenox    # FEN  - DASH Diet  - Monitor and replete electrolytes as needed    Previous Admissions Included  8/8/2022 - 8/11/2022: s/p Laparoscopic interval appendectomy, Lysis of intestinal adhesions, Serosal tear repair  6/9/2022: ER Visit for COVID testing  5/21/2022 - 5/26/2022: Perforated appendicitis with fluid collection. Wall thickening of the ascending colon above the level of the ileocecal valve, likely due to inflammation, however neoplasm cannot be ruled out. Based on above CT and CT from 2018 which stated concern for neoplasm, obtained colonoscopy report from 7/3/2021, performed by Dr. Mando Hull which demonstrated no evidence of cecal mass, and cecal biopsy nml.  4/5/2018 - 4/9/2018: Appendicitis, treated conservatively with antibiotics. Inflammation of cecum.    Patient case and management was discussed with ER Attending. Patient in severe intractable pain. CBD dilated but LFTs are within normal limits. Patient stable to wait until Monday if she needs to see GI. Will order IV Morphine prn for severe intractable pain.  I did examine all labs (including CBC, CMP, Lipase, Troponin, UA), imaging, prior notes

## 2025-01-04 NOTE — ED ADULT NURSE NOTE - NSFALLUNIVINTERV_ED_ALL_ED
Bed/Stretcher in lowest position, wheels locked, appropriate side rails in place/Call bell, personal items and telephone in reach/Instruct patient to call for assistance before getting out of bed/chair/stretcher/Non-slip footwear applied when patient is off stretcher/[place] to call system/Physically safe environment - no spills, clutter or unnecessary equipment/Purposeful proactive rounding/Room/bathroom lighting operational, light cord in reach

## 2025-01-04 NOTE — ED PROVIDER NOTE - CLINICAL SUMMARY MEDICAL DECISION MAKING FREE TEXT BOX
74-year-old female with past medical history of diabetes, hyperlipidemia coming in with right flank pain, right upper quadrant pain going to right lower quadrant, nausea.  No fevers, chills.  No urinary complaints.  No associated cough or difficulty breathing.  No history of kidney stones.  History of cholecystectomy.    Patient is nontoxic-appearing.  No distress.  Abdomen soft with mild tenderness palpation in epigastric region.  Positive right CVA tenderness to palpation.  Mild tenderness palpation in suprapubic region.    Differential diagnosis include but not limited to kidney stone, pyelonephritis, electrolyte abnormality, anemia.

## 2025-01-04 NOTE — H&P ADULT - NSHPPHYSICALEXAM_GEN_ALL_CORE
LOS: 1d    VITALS:   T(C): 36.6 (01-04-25 @ 15:30), Max: 36.6 (01-04-25 @ 15:30)  HR: 76 (01-04-25 @ 15:30) (70 - 76)  BP: 142/80 (01-04-25 @ 15:30) (142/80 - 173/81)  RR: 18 (01-04-25 @ 15:30) (18 - 18)  SpO2: 95% (01-04-25 @ 15:30) (95% - 96%)    GENERAL: WDWN, mild distress due to pain  HEAD:  Atraumatic, Normocephalic  EYES: EOMI, PERRLA, conjunctiva and sclera clear  ENT: Moist mucous membranes  NECK: Supple, No JVD  CHEST/LUNG: Clear to auscultation bilaterally; No rales, rhonchi, wheezing, or rubs. Unlabored respirations  HEART: Regular rate and rhythm; No murmurs, rubs, or gallops  ABDOMEN: BSx4; Soft, tender to palpation over RUQ and R flank, nondistended  EXTREMITIES:  2+ Peripheral Pulses, brisk capillary refill. No clubbing, cyanosis, or edema  NERVOUS SYSTEM:  A&Ox3, no focal deficits   SKIN: No rashes or lesions

## 2025-01-04 NOTE — H&P ADULT - PROBLEM SELECTOR PLAN 4
h/o DM on trulicity, pioglitazone, metformin, jardiance, and januvia per surescripts  - hold oral dm meds  -f/u A1c  -c/w low sliding scale  -Adjust insulin as indicated  -FS ACHS

## 2025-01-04 NOTE — H&P ADULT - HISTORY OF PRESENT ILLNESS
74y F with PMH of HTN, DM, HLD, Cholecystis s/p cholecystectomy,  perforated appendicitis s/p IR drainage of abscess and interval appendectomy (2022) presenting with right sided abdominal and flank pain. Patient states that current pain is similar to pain experienced when she had cholecystitis. States that pain started 2 weeks ago, would come and go, but has become constant for last 2 days prompting her to come to ED. Currently indicates that pain is mostly in the right flank, described as sharp in quality, not associated with eating, worse with movement and lying flat, disrupting sleep, and with no noted relieving factors other than IV morphine in ED. Endorses nausea without vomiting, decreased appetite, and constipation. Denies fever, chills, headache, dizziness, chest pain, palpitations, shortness of breath, diarrhea, and dysuria.    In the ED:  T(F): , Max: 97.8 (15:30); HR:  (70 - 76); BP:  (142/80 - 173/81); RR:  (18 - 18); SpO2:  (95% - 96%)  WBC:6.24, Hb.8, PLT:215  Na:137, K:4.9, Cl:105, HCO3:23, BUN:17, sCr:0.77, Troponin:5.3  s/p ketorolac   Injectable 15 milliGRAM(s); morphine  - Injectable 4 milliGRAM(s)  CT A/P: Dilated CBD demonstrating interval progression in comparison to the prior study (). 3 cm sized cyst head of the pancreas, stable.  RUQ US: Hepatic steatosis

## 2025-01-04 NOTE — H&P ADULT - NSICDXPASTSURGICALHX_GEN_ALL_CORE_FT
PAST SURGICAL HISTORY:  H/O bilateral cataract extraction     S/P appendectomy     S/P cholecystectomy

## 2025-01-04 NOTE — ED PROVIDER NOTE - PROGRESS NOTE DETAILS
gordon: Urinalysis is negative.  Patient still complains of pain.  CBD is dilated.  Will admit for further evaluation and pain control.

## 2025-01-05 DIAGNOSIS — I10 ESSENTIAL (PRIMARY) HYPERTENSION: ICD-10-CM

## 2025-01-05 DIAGNOSIS — E78.5 HYPERLIPIDEMIA, UNSPECIFIED: ICD-10-CM

## 2025-01-05 DIAGNOSIS — R10.9 UNSPECIFIED ABDOMINAL PAIN: ICD-10-CM

## 2025-01-05 DIAGNOSIS — Z90.49 ACQUIRED ABSENCE OF OTHER SPECIFIED PARTS OF DIGESTIVE TRACT: Chronic | ICD-10-CM

## 2025-01-05 DIAGNOSIS — Z29.9 ENCOUNTER FOR PROPHYLACTIC MEASURES, UNSPECIFIED: ICD-10-CM

## 2025-01-05 DIAGNOSIS — E11.9 TYPE 2 DIABETES MELLITUS WITHOUT COMPLICATIONS: ICD-10-CM

## 2025-01-05 LAB
A1C WITH ESTIMATED AVERAGE GLUCOSE RESULT: 6.8 % — HIGH (ref 4–5.6)
ALBUMIN SERPL ELPH-MCNC: 3.7 G/DL — SIGNIFICANT CHANGE UP (ref 3.5–5)
ALP SERPL-CCNC: 40 U/L — SIGNIFICANT CHANGE UP (ref 40–120)
ALT FLD-CCNC: 16 U/L DA — SIGNIFICANT CHANGE UP (ref 10–60)
ANION GAP SERPL CALC-SCNC: 11 MMOL/L — SIGNIFICANT CHANGE UP (ref 5–17)
AST SERPL-CCNC: 14 U/L — SIGNIFICANT CHANGE UP (ref 10–40)
BASOPHILS # BLD AUTO: 0.03 K/UL — SIGNIFICANT CHANGE UP (ref 0–0.2)
BASOPHILS NFR BLD AUTO: 0.5 % — SIGNIFICANT CHANGE UP (ref 0–2)
BILIRUB SERPL-MCNC: 0.5 MG/DL — SIGNIFICANT CHANGE UP (ref 0.2–1.2)
BUN SERPL-MCNC: 17 MG/DL — SIGNIFICANT CHANGE UP (ref 7–18)
CALCIUM SERPL-MCNC: 9 MG/DL — SIGNIFICANT CHANGE UP (ref 8.4–10.5)
CHLORIDE SERPL-SCNC: 108 MMOL/L — SIGNIFICANT CHANGE UP (ref 96–108)
CO2 SERPL-SCNC: 21 MMOL/L — LOW (ref 22–31)
CREAT SERPL-MCNC: 0.65 MG/DL — SIGNIFICANT CHANGE UP (ref 0.5–1.3)
EGFR: 92 ML/MIN/1.73M2 — SIGNIFICANT CHANGE UP
EOSINOPHIL # BLD AUTO: 0.12 K/UL — SIGNIFICANT CHANGE UP (ref 0–0.5)
EOSINOPHIL NFR BLD AUTO: 2 % — SIGNIFICANT CHANGE UP (ref 0–6)
ESTIMATED AVERAGE GLUCOSE: 148 MG/DL — HIGH (ref 68–114)
GLUCOSE BLDC GLUCOMTR-MCNC: 80 MG/DL — SIGNIFICANT CHANGE UP (ref 70–99)
GLUCOSE BLDC GLUCOMTR-MCNC: 92 MG/DL — SIGNIFICANT CHANGE UP (ref 70–99)
GLUCOSE BLDC GLUCOMTR-MCNC: 94 MG/DL — SIGNIFICANT CHANGE UP (ref 70–99)
GLUCOSE BLDC GLUCOMTR-MCNC: 98 MG/DL — SIGNIFICANT CHANGE UP (ref 70–99)
GLUCOSE SERPL-MCNC: 80 MG/DL — SIGNIFICANT CHANGE UP (ref 70–99)
HCT VFR BLD CALC: 38 % — SIGNIFICANT CHANGE UP (ref 34.5–45)
HGB BLD-MCNC: 12.7 G/DL — SIGNIFICANT CHANGE UP (ref 11.5–15.5)
IMM GRANULOCYTES NFR BLD AUTO: 0.2 % — SIGNIFICANT CHANGE UP (ref 0–0.9)
LYMPHOCYTES # BLD AUTO: 1.76 K/UL — SIGNIFICANT CHANGE UP (ref 1–3.3)
LYMPHOCYTES # BLD AUTO: 29 % — SIGNIFICANT CHANGE UP (ref 13–44)
MCHC RBC-ENTMCNC: 30.2 PG — SIGNIFICANT CHANGE UP (ref 27–34)
MCHC RBC-ENTMCNC: 33.4 G/DL — SIGNIFICANT CHANGE UP (ref 32–36)
MCV RBC AUTO: 90.3 FL — SIGNIFICANT CHANGE UP (ref 80–100)
MONOCYTES # BLD AUTO: 0.51 K/UL — SIGNIFICANT CHANGE UP (ref 0–0.9)
MONOCYTES NFR BLD AUTO: 8.4 % — SIGNIFICANT CHANGE UP (ref 2–14)
NEUTROPHILS # BLD AUTO: 3.64 K/UL — SIGNIFICANT CHANGE UP (ref 1.8–7.4)
NEUTROPHILS NFR BLD AUTO: 59.9 % — SIGNIFICANT CHANGE UP (ref 43–77)
NRBC # BLD: 0 /100 WBCS — SIGNIFICANT CHANGE UP (ref 0–0)
PLATELET # BLD AUTO: 200 K/UL — SIGNIFICANT CHANGE UP (ref 150–400)
POTASSIUM SERPL-MCNC: 3.7 MMOL/L — SIGNIFICANT CHANGE UP (ref 3.5–5.3)
POTASSIUM SERPL-SCNC: 3.7 MMOL/L — SIGNIFICANT CHANGE UP (ref 3.5–5.3)
PROT SERPL-MCNC: 6.9 G/DL — SIGNIFICANT CHANGE UP (ref 6–8.3)
RBC # BLD: 4.21 M/UL — SIGNIFICANT CHANGE UP (ref 3.8–5.2)
RBC # FLD: 15.4 % — HIGH (ref 10.3–14.5)
SODIUM SERPL-SCNC: 140 MMOL/L — SIGNIFICANT CHANGE UP (ref 135–145)
WBC # BLD: 6.07 K/UL — SIGNIFICANT CHANGE UP (ref 3.8–10.5)
WBC # FLD AUTO: 6.07 K/UL — SIGNIFICANT CHANGE UP (ref 3.8–10.5)

## 2025-01-05 PROCEDURE — 99233 SBSQ HOSP IP/OBS HIGH 50: CPT

## 2025-01-05 RX ORDER — ATORVASTATIN CALCIUM 40 MG/1
20 TABLET, FILM COATED ORAL AT BEDTIME
Refills: 0 | Status: DISCONTINUED | OUTPATIENT
Start: 2025-01-05 | End: 2025-01-08

## 2025-01-05 RX ORDER — PIOGLITAZONE HCL 30 MG
0 TABLET ORAL
Qty: 0 | Refills: 0 | DISCHARGE

## 2025-01-05 RX ORDER — INFLUENZA A VIRUS A/WISCONSIN/588/2019 (H1N1) RECOMBINANT HEMAGGLUTININ ANTIGEN, INFLUENZA A VIRUS A/DARWIN/6/2021 (H3N2) RECOMBINANT HEMAGGLUTININ ANTIGEN, INFLUENZA B VIRUS B/AUSTRIA/1359417/2021 RECOMBINANT HEMAGGLUTININ ANTIGEN, AND INFLUENZA B VIRUS B/PHUKET/3073/2013 RECOMBINANT HEMAGGLUTININ ANTIGEN 45; 45; 45; 45 UG/.5ML; UG/.5ML; UG/.5ML; UG/.5ML
0.5 INJECTION INTRAMUSCULAR ONCE
Refills: 0 | Status: DISCONTINUED | OUTPATIENT
Start: 2025-01-05 | End: 2025-01-08

## 2025-01-05 RX ORDER — MELOXICAM 15 MG
0 TABLET ORAL
Qty: 0 | Refills: 0 | DISCHARGE

## 2025-01-05 RX ORDER — SENNOSIDES 8.6 MG/1
2 TABLET, FILM COATED ORAL
Refills: 0 | DISCHARGE

## 2025-01-05 RX ORDER — GABAPENTIN 300 MG/1
0 CAPSULE ORAL
Qty: 0 | Refills: 0 | DISCHARGE

## 2025-01-05 RX ORDER — ICOSAPENT ETHYL 1 G/1
0 CAPSULE ORAL
Qty: 0 | Refills: 0 | DISCHARGE

## 2025-01-05 RX ORDER — ENOXAPARIN SODIUM 60 MG/.6ML
40 INJECTION INTRAVENOUS; SUBCUTANEOUS EVERY 24 HOURS
Refills: 0 | Status: DISCONTINUED | OUTPATIENT
Start: 2025-01-05 | End: 2025-01-08

## 2025-01-05 RX ORDER — METOPROLOL TARTRATE 50 MG
50 TABLET ORAL DAILY
Refills: 0 | Status: DISCONTINUED | OUTPATIENT
Start: 2025-01-05 | End: 2025-01-08

## 2025-01-05 RX ORDER — LATANOPROSTENE BUNOD 0.24 MG/ML
1 SOLUTION/ DROPS OPHTHALMIC
Refills: 0 | DISCHARGE

## 2025-01-05 RX ORDER — FENOFIBRATE 48 MG/1
0 TABLET ORAL
Qty: 0 | Refills: 0 | DISCHARGE

## 2025-01-05 RX ORDER — DULAGLUTIDE 3 MG/.5ML
0 INJECTION, SOLUTION SUBCUTANEOUS
Qty: 0 | Refills: 0 | DISCHARGE

## 2025-01-05 RX ORDER — LATANOPROST 50 UG/ML
1 SOLUTION OPHTHALMIC AT BEDTIME
Refills: 0 | Status: DISCONTINUED | OUTPATIENT
Start: 2025-01-05 | End: 2025-01-08

## 2025-01-05 RX ORDER — ERGOCALCIFEROL 1.25 MG/1
1 CAPSULE ORAL
Refills: 0 | DISCHARGE

## 2025-01-05 RX ORDER — TRAMADOL HYDROCHLORIDE 50 MG/1
0 TABLET ORAL
Qty: 0 | Refills: 0 | DISCHARGE

## 2025-01-05 RX ORDER — SITAGLIPTIN 50 MG/1
0 TABLET ORAL
Qty: 0 | Refills: 0 | DISCHARGE

## 2025-01-05 RX ADMIN — ONDANSETRON 4 MILLIGRAM(S): 4 TABLET ORAL at 15:09

## 2025-01-05 RX ADMIN — Medication 4 MILLIGRAM(S): at 10:33

## 2025-01-05 RX ADMIN — ENOXAPARIN SODIUM 40 MILLIGRAM(S): 60 INJECTION INTRAVENOUS; SUBCUTANEOUS at 15:07

## 2025-01-05 RX ADMIN — Medication 4 MILLIGRAM(S): at 21:50

## 2025-01-05 RX ADMIN — Medication 50 MILLIGRAM(S): at 06:13

## 2025-01-05 RX ADMIN — Medication 4 MILLIGRAM(S): at 05:26

## 2025-01-05 RX ADMIN — SENNOSIDES 2 TABLET(S): 8.6 TABLET, FILM COATED ORAL at 21:35

## 2025-01-05 RX ADMIN — Medication 4 MILLIGRAM(S): at 11:00

## 2025-01-05 RX ADMIN — Medication 17 GRAM(S): at 15:08

## 2025-01-05 RX ADMIN — LATANOPROST 1 DROP(S): 50 SOLUTION OPHTHALMIC at 21:35

## 2025-01-05 RX ADMIN — ATORVASTATIN CALCIUM 20 MILLIGRAM(S): 40 TABLET, FILM COATED ORAL at 21:35

## 2025-01-05 RX ADMIN — Medication 4 MILLIGRAM(S): at 21:03

## 2025-01-05 NOTE — CONSULT NOTE ADULT - NEGATIVE NEUROLOGICAL SYMPTOMS
no focal seizures/no syncope/no tremors/no vertigo/no loss of sensation/no headache/no loss of consciousness/no confusion

## 2025-01-05 NOTE — PHARMACOTHERAPY INTERVENTION NOTE - COMMENTS
Patient identified by [facility] for Patients 65 y/o and Older Report.     Morphine 2mg/4mg Q6H PRN - intractable abdominal pain     No intervention at this time.

## 2025-01-05 NOTE — DISCHARGE NOTE PROVIDER - HOSPITAL COURSE
74y F with PMH of HTN, DM, HLD, Cholecystis s/p cholecystectomy,  perforated appendicitis s/p IR drainage of abscess and interval appendectomy (2022) presenting with right sided abdominal and flank pain.     In the ED: T(F): Max: 97.8 (15:30); HR:  (70 - 76); BP:  (142/80 - 173/81); RR:  (18 - 18); SpO2:  (95% - 96%)  WBC:6.24, Hb.8, PLT:215, Na:137, K:4.9, Cl:105, HCO3:23, BUN:17, sCr:0.77, Troponin:5.3  s/p ketorolac   Injectable 15 milliGRAM(s); morphine  - Injectable 4 milliGRAM(s)  CT A/P: Dilated CBD demonstrating interval progression in comparison to the prior study (). 3 cm sized cyst head of the pancreas, stable.  RUQ US: Hepatic steatosis     Admitted to medicine for intractable RUQ pain with evidence of dilated CBD on CT imaging.     GI consulted and reccs:  1. MRCP  2. Protonix 40mg daily  3. Avoid NSAID  4. Pain control  5. Diet as tolerated  6. Zofran PRN  7. Follow up LFT's    MRCP shows: ????????         74y F with PMH of HTN, DM, HLD, Cholecystis s/p cholecystectomy,  perforated appendicitis s/p IR drainage of abscess and interval appendectomy (2022) presenting with right sided abdominal and flank pain.     In the ED: T(F): Max: 97.8 (15:30); HR:  (70 - 76); BP:  (142/80 - 173/81); RR:  (18 - 18); SpO2:  (95% - 96%)  WBC:6.24, Hb.8, PLT:215, Na:137, K:4.9, Cl:105, HCO3:23, BUN:17, sCr:0.77, Troponin:5.3  s/p ketorolac   Injectable 15 milliGRAM(s); morphine  - Injectable 4 milliGRAM(s)  CT A/P: Dilated CBD demonstrating interval progression in comparison to the prior study (). 3 cm sized cyst head of the pancreas, stable.  RUQ US: Hepatic steatosis     Admitted to medicine for intractable RUQ pain with evidence of dilated CBD on CT imaging.     GI consulted and reccs:  1. MRCP  2. Protonix 40mg daily    MRCP unremarkable and unchanged from previous scan.  shows Mild biliary duct dilatation after cholecystectomy. No evidence of choledocholithiasis. Pancreatic cyst  measures 1.7 cm, unchanged since . No main duct dilatation or suspicious enhancement. ADRENALS: Stable small left adrenal nodules measuring up to 1.1 cm which do not have MR characteristics of lipid Rich adenomas, unchanged since .  Diet advanced     Patient seen and examined at bedside, discussed with medical attending. Patient medically cleared for discharge to home.

## 2025-01-05 NOTE — PATIENT PROFILE ADULT - FALL HARM RISK - RISK INTERVENTIONS

## 2025-01-05 NOTE — DISCHARGE NOTE PROVIDER - NSDCCPCAREPLAN_GEN_ALL_CORE_FT
PRINCIPAL DISCHARGE DIAGNOSIS  Diagnosis: Flank pain  Assessment and Plan of Treatment:       SECONDARY DISCHARGE DIAGNOSES  Diagnosis: HLD (hyperlipidemia)  Assessment and Plan of Treatment: You have a history of hyperlipidemia, which is when you have too much cholesterol in your blood. High amounts of cholesterol in your blood can put you at higher risks for heart attck, strokes and other health problems. Follow up with PCP for treatment goals, continue medication as prescribed, have liver function testing every 3 months as anti lipid medications can cause liver irritation, eat low fat meals, avoid red meat, butter, fried foods and cheese. Get daily exercise.      Diagnosis: DM (diabetes mellitus)  Assessment and Plan of Treatment: Continue to follow with your primary care MD or your endocrinologist. Discuss what the goal hemoglobin A1C level is for you.  Follow a heart healthy diabetic diet. If you check your fingerstick glucose at home, call your MD if it is greater than 250mg/dL on 2 occasions or less than 100mg/dL on 2 occasions. Know signs of low blood sugar, such as: dizziness, shakiness, sweating, confusion, hunger, nervousness- drink 4 ounces apple juice if occurs and call your doctor. Know early signs of high blood sugar, such as: frequent urination, increased thirst, blurry vision, fatigue, headache - call your doctor if this occurs.      Diagnosis: HTN (hypertension)  Assessment and Plan of Treatment: You have a history of high blood pressure. High blood pressure is a condition that puts you at risk for heart attack, stroke and kidney disease. Please continue to take your medications as prescribed. You can also help control your blood pressure by maintaining a healthy weight, eating a diet low in fat and rich in fruits and vegetables, reduce the amount of salt in your diet. Also, reduce alcohol and try to include some form of physical activity daily for at least 30 mins. Follow up with your medical doctor to establish long term blood pressure treatment goals.  Notify your doctor if you have any of the following symptoms:   Dizziness, Lightheadedness, Blurry vision, Headache, Chest pain, Shortness of breath       PRINCIPAL DISCHARGE DIAGNOSIS  Diagnosis: Flank pain  Assessment and Plan of Treatment: You presented with Right upper quadrant pain and was followed by gastroenterology inpatient whom suggested an MRCP be preformed. your MRCP showed Mild biliary duct dilatation after cholecystectomy. No evidence of choledocholithiasis. Pancreatic cyst  measures 1.7 cm, unchanged since 2022. No main duct dilatation or suspicious enhancement. ADRENALS: Stable small   left adrenal nodules measuring up to 1.1 cm which do not have MR   characteristics of lipid Rich adenomas, unchanged since 2022.  Diet advanced and tolerated.   You will need to follow up with your PCP in 1 week.         SECONDARY DISCHARGE DIAGNOSES  Diagnosis: HTN (hypertension)  Assessment and Plan of Treatment: You have a history of high blood pressure. High blood pressure is a condition that puts you at risk for heart attack, stroke and kidney disease. Please continue to take your medications as prescribed. You can also help control your blood pressure by maintaining a healthy weight, eating a diet low in fat and rich in fruits and vegetables, reduce the amount of salt in your diet. Also, reduce alcohol and try to include some form of physical activity daily for at least 30 mins. Follow up with your medical doctor to establish long term blood pressure treatment goals.  Notify your doctor if you have any of the following symptoms:   Dizziness, Lightheadedness, Blurry vision, Headache, Chest pain, Shortness of breath      Diagnosis: HLD (hyperlipidemia)  Assessment and Plan of Treatment: You have a history of hyperlipidemia, which is when you have too much cholesterol in your blood. High amounts of cholesterol in your blood can put you at higher risks for heart attck, strokes and other health problems. Follow up with PCP for treatment goals, continue medication as prescribed, have liver function testing every 3 months as anti lipid medications can cause liver irritation, eat low fat meals, avoid red meat, butter, fried foods and cheese. Get daily exercise.      Diagnosis: DM (diabetes mellitus)  Assessment and Plan of Treatment: Continue to follow with your primary care MD or your endocrinologist. Discuss what the goal hemoglobin A1C level is for you.  Follow a heart healthy diabetic diet. If you check your fingerstick glucose at home, call your MD if it is greater than 250mg/dL on 2 occasions or less than 100mg/dL on 2 occasions. Know signs of low blood sugar, such as: dizziness, shakiness, sweating, confusion, hunger, nervousness- drink 4 ounces apple juice if occurs and call your doctor. Know early signs of high blood sugar, such as: frequent urination, increased thirst, blurry vision, fatigue, headache - call your doctor if this occurs.       PRINCIPAL DISCHARGE DIAGNOSIS  Diagnosis: Dilated cbd, acquired  Assessment and Plan of Treatment: You presented with Right upper quadrant pain and was followed by gastroenterology inpatient whom suggested an MRCP be preformed. your MRCP showed Mild biliary duct dilatation after cholecystectomy. No evidence of choledocholithiasis. Pancreatic cyst  measures 1.7 cm, unchanged since 2022. No main duct dilatation or suspicious enhancement. ADRENALS: Stable small   left adrenal nodules measuring up to 1.1 cm which do not have MR   characteristics of lipid Rich adenomas, unchanged since 2022.  Diet advanced and tolerated.   You will need to follow up with your PCP in 1 week.      SECONDARY DISCHARGE DIAGNOSES  Diagnosis: Flank pain  Assessment and Plan of Treatment: plan as above.      Diagnosis: HLD (hyperlipidemia)  Assessment and Plan of Treatment: You have a history of hyperlipidemia, which is when you have too much cholesterol in your blood. High amounts of cholesterol in your blood can put you at higher risks for heart attck, strokes and other health problems. Follow up with PCP for treatment goals, continue medication as prescribed, have liver function testing every 3 months as anti lipid medications can cause liver irritation, eat low fat meals, avoid red meat, butter, fried foods and cheese. Get daily exercise.      Diagnosis: DM (diabetes mellitus)  Assessment and Plan of Treatment: Continue to follow with your primary care MD or your endocrinologist. Discuss what the goal hemoglobin A1C level is for you.  Follow a heart healthy diabetic diet. If you check your fingerstick glucose at home, call your MD if it is greater than 250mg/dL on 2 occasions or less than 100mg/dL on 2 occasions. Know signs of low blood sugar, such as: dizziness, shakiness, sweating, confusion, hunger, nervousness- drink 4 ounces apple juice if occurs and call your doctor. Know early signs of high blood sugar, such as: frequent urination, increased thirst, blurry vision, fatigue, headache - call your doctor if this occurs.      Diagnosis: HTN (hypertension)  Assessment and Plan of Treatment: You have a history of high blood pressure. High blood pressure is a condition that puts you at risk for heart attack, stroke and kidney disease. Please continue to take your medications as prescribed. You can also help control your blood pressure by maintaining a healthy weight, eating a diet low in fat and rich in fruits and vegetables, reduce the amount of salt in your diet. Also, reduce alcohol and try to include some form of physical activity daily for at least 30 mins. Follow up with your medical doctor to establish long term blood pressure treatment goals.  Notify your doctor if you have any of the following symptoms:   Dizziness, Lightheadedness, Blurry vision, Headache, Chest pain, Shortness of breath

## 2025-01-05 NOTE — CONSULT NOTE ADULT - NEGATIVE OPHTHALMOLOGIC SYMPTOMS
no diplopia/no photophobia/no lacrimation L/no lacrimation R/no blurred vision L/no blurred vision R/no discharge L/no discharge R/no pain L/no pain R/no scleral injection L/no scleral injection R

## 2025-01-05 NOTE — DISCHARGE NOTE PROVIDER - ATTENDING DISCHARGE PHYSICAL EXAMINATION:
NO distress, abdomen is nontender to palpation. right flank mildly tender. Nontoxic appearing, appears younger than stated age. Heart regular, lungs clear, abdomen soft nontender, no lower ext edema

## 2025-01-05 NOTE — CONSULT NOTE ADULT - ASSESSMENT
1. Abdominal pain (etiology is not clear)  2. Dilated CBD most likely due to cholecystectomy  3. No evidence of biliary obstruction (LFT's normal)  4. R/o Peptic ulcer disease  5. Diverticulosis with out diverticulitis  6. Duodenal diverticulum  7. Pancreatic head cyst (unchanged)  8. Hepatic steatosis    Suggestions:    1. MRI of abdomen  2. Protonix 40mg daily  3. Avoid NSAID  4. Pain control  5. Diet as tolerated  6. Follow up LFT's  7. DVT prophylaxis   1. Abdominal pain (etiology is not clear)  2. Dilated CBD most likely due to cholecystectomy  3. No evidence of biliary obstruction (LFT's normal)  4. R/o Peptic ulcer disease  5. Diverticulosis with out diverticulitis  6. Duodenal diverticulum  7. Pancreatic head cyst (unchanged)  8. Hepatic steatosis    Suggestions:    1. MRCP  2. Protonix 40mg daily  3. Avoid NSAID  4. Pain control  5. Diet as tolerated  6. Zofran PRN  7. Follow up LFT's  7. DVT prophylaxis

## 2025-01-05 NOTE — CONSULT NOTE ADULT - SUBJECTIVE AND OBJECTIVE BOX
[  ] STAT REQUEST              [X  ] ROUTINE REQUEST    Patient is a 74 year old female with abdominal pain. GI consulted to evaluate.       HPI:  74 year old female with past medical history significant for HTN, DM, Hyperlipidemia cholecystitis, s/p cholecystectomy,  perforated appendicitis s/p IR drainage of abscess and interval appendectomy (8/2022) presented to the emergency room with 2 weeks history of progressively worsening intermittent 10/10 intensity, sharp RUQ abdominal pain radiating to her right flank area associated with nausea, decreased appetite, and constipation. Patient denies vomiting, hematemesis, hematochezia, melena, fever, chills, chest pain, SOB, cough, hematuria, dysuria or diarrhea.         PAIN MANAGEMENT:  Pain Scale:                10 /10  Pain Location:  RUQ abdominal pain       PAST MEDICAL HISTORY     Essential hypertension    DM    Hyperlipidemia    Vitamin B12 deficiency    Vitamin D deficiency    Cataract    Cholecystitis    Seasonal allergies    Seasonal rhinitis    History of sciatica    Obesity    Appendicitis        PAST SURGICAL HISTORY    Cholecystectomy    Appendectomy    Bilateral cataract extraction         Allergies    No Known Allergies    Intolerances  None         MEDICATIONS  (STANDING):  atorvastatin 20 milliGRAM(s) Oral at bedtime  enoxaparin Injectable 40 milliGRAM(s) SubCutaneous every 24 hours  insulin lispro (ADMELOG) corrective regimen sliding scale   SubCutaneous three times a day before meals  insulin lispro (ADMELOG) corrective regimen sliding scale   SubCutaneous at bedtime  latanoprost 0.005% Ophthalmic Solution 1 Drop(s) Both EYES at bedtime  metoprolol succinate ER 50 milliGRAM(s) Oral daily  naloxone Injectable 0.4 milliGRAM(s) IV Push once  polyethylene glycol 3350 17 Gram(s) Oral daily  senna 2 Tablet(s) Oral at bedtime    MEDICATIONS  (PRN):  acetaminophen     Tablet .. 650 milliGRAM(s) Oral every 6 hours PRN Temp greater or equal to 38C (100.4F), Mild Pain (1 - 3)  bisacodyl 5 milliGRAM(s) Oral daily PRN Constipation  morphine  - Injectable 2 milliGRAM(s) IV Push every 4 hours PRN Moderate Pain (4 - 6)  morphine  - Injectable 4 milliGRAM(s) IV Push every 4 hours PRN Severe Pain (7 - 10)  ondansetron Injectable 4 milliGRAM(s) IV Push every 8 hours PRN Nausea and/or Vomiting      SOCIAL HISTORY  Advanced Directives:       [ X ] Full Code       [  ] DNR  Marital Status:         [  ] M      [ X ] S      [  ] D       [  ] W  Children:       [ X ] Yes      [  ] No  Occupation:        [  ] Employed       [  X] Unemployed       [  ] Retired  Diet:       [ X ] Regular       [  ] PEG feeding          [  ] NG tube feeding  Drug Use:           [ X ] Patient denied          [  ] Yes  Alcohol:           [ X ] No             [  ] Yes (socially)         [  ] Yes (chronic)  Tobacco:           [  ] Yes           [ X ] No      FAMILY HISTORY  [ X ] Heart Disease            [ X ] Diabetes             [ X ] HTN             [  ] Colon Cancer             [  ] Stomach Cancer              [  ] Pancreatic Cancer      VITAL SIGNS   Vital Signs Last 24 Hrs  T(C): 36.3 (01-05-25 @ 12:55), Max: 37 (01-05-25 @ 05:39)  T(F): 97.4 (01-05-25 @ 12:55), Max: 98.6 (01-05-25 @ 05:39)  HR: 69 (01-05-25 @ 12:55) (66 - 78)  BP: 138/79 (01-05-25 @ 12:55) (137/74 - 143/75)  BP(mean): 100 (01-04-25 @ 15:30) (100 - 100)  RR: 17 (01-05-25 @ 12:55) (17 - 18)  SpO2: 95% (01-05-25 @ 12:55) (95% - 98%)       CBC Full  -  ( 05 Jan 2025 05:35 )  WBC Count : 6.07 K/uL  RBC Count : 4.21 M/uL  Hemoglobin : 12.7 g/dL  Hematocrit : 38.0 %  Platelet Count - Automated : 200 K/uL  Mean Cell Volume : 90.3 fl  Mean Cell Hemoglobin : 30.2 pg  Mean Cell Hemoglobin Concentration : 33.4 g/dL  Auto Neutrophil # : 3.64 K/uL  Auto Lymphocyte # : 1.76 K/uL  Auto Monocyte # : 0.51 K/uL  Auto Eosinophil # : 0.12 K/uL  Auto Basophil # : 0.03 K/uL  Auto Neutrophil % : 59.9 %  Auto Lymphocyte % : 29.0 %  Auto Monocyte % : 8.4 %  Auto Eosinophil % : 2.0 %  Auto Basophil % : 0.5 %      01-05    140  |  108  |  17  ----------------------------<  80  3.7   |  21[L]  |  0.65    Ca    9.0      05 Jan 2025 05:35    TPro  6.9  /  Alb  3.7  /  TBili  0.5  /  DBili  x   /  AST  14  /  ALT  16  /  AlkPhos  40  01-05       Urinalysis with Rflx Culture (01.04.25 @ 18:20)   Urine Appearance: Clear  Color: Yellow  Specific Gravity: 1.076  pH Urine: 5.0  Protein, Urine: Negative mg/dL  Glucose Qualitative, Urine: >=1000 mg/dL  Ketone - Urine: 15 mg/dL  Blood, Urine: Negative  Bilirubin: Negative  Urobilinogen: 0.2 mg/dL  Leukocyte Esterase Concentration: Negative  Nitrite: Negative    ECG    Ventricular Rate 73 BPM    Atrial Rate 73 BPM    P-R Interval 182 ms    QRS Duration 82 ms     ms    QTc 436 ms    P Axis 64 degrees    R Axis -16 degrees    T Axis 29 degrees    Diagnosis Line Normal sinus rhythm  Normal ECG         RADIOLOGY/IMAGING                  ACC: [id number] EXAM:  CT ABDOMEN AND PELVIS IC   ORDERED BY: NEFTALI BAUMAN     PROCEDURE DATE:  01/04/2025          INTERPRETATION:  CLINICAL INFORMATION: Right upper quadrant pain.    COMPARISON: 06/19/2022.    CONTRAST/COMPLICATIONS:  IV Contrast: Omnipaque 350  90 cc administered   10 cc discarded  Oral Contrast: NONE  .    PROCEDURE:  CT of the Abdomen and Pelvis was performed.  Sagittal and coronal reformats were performed.    FINDINGS:  LOWER CHEST: Mosaic attenuation and a possible fibrotic lung disease,   similar to the prior study. Subsegmental atelectasis/scarring lingular   segment. Atherosclerotic calcification including the coronary arteries.    LIVER: Within normal limits.  BILE DUCTS: Dilated CBD measuring 1.2 cm, increased in comparison to the   prior study. It may be related to prior cholecystectomy, however, in view   of clinical history, consider MRCP  for further evaluation.  GALLBLADDER: Cholecystectomy.  SPLEEN: Within normal limits.  PANCREAS: 1.3 cm sized cyst head of the pancreas, stable.  ADRENALS: There are 2 left adrenal nodules measuring up to 1.4 cm, stable.  KIDNEYS/URETERS: No hydronephrosis. Excreted contrast within the calyces   limiting the evaluation of previously noted nonobstructing calculi.    BLADDER: Within normal limits.  REPRODUCTIVE ORGANS: Uterus and adnexa within normal limits.    BOWEL: No bowel obstruction. Appendix is absent. Multifocal scattered   uncomplicated colonic uncomplicated diverticula. Uncomplicated duodenal   diverticulum.  PERITONEUM/RETROPERITONEUM: Within normal limits.  VESSELS: Atherosclerotic changes.  LYMPH NODES: No lymphadenopathy.  ABDOMINAL WALL: Within normal limits.  BONES: Degenerative changes.    IMPRESSION:  Dilated CBD demonstrating interval progression in comparison to the prior   study as described above.        ACC: [id number] EXAM:  US ABDOMEN RT UPR QUADRANT   ORDERED BY: NEFTALI BAUMAN     PROCEDURE DATE:  01/04/2025          INTERPRETATION:  CLINICAL INFORMATION: Right upper quadrant pain    COMPARISON: CT dated 6/9/2022    TECHNIQUE: Sonography of the right upper quadrant.    FINDINGS:  Liver: Steatosis. The main portal vein is patent.  Bile ducts: Normal caliber. Common bile duct measures 8 mm.  Gallbladder: Cholecystectomy.  Pancreas: Poorly visualized.  Right kidney: 10.4 cm. No hydronephrosis.  Ascites: None.  IVC: Visualized portions are within normal limits.    IMPRESSION:    Hepatic steatosis.

## 2025-01-05 NOTE — DISCHARGE NOTE PROVIDER - NSDCMRMEDTOKEN_GEN_ALL_CORE_FT
atorvastatin 20 mg oral tablet: 1 tab(s) orally once a day  ergocalciferol 1.25 mg (50,000 intl units) oral capsule: 1 cap(s) orally once a week  FENOFIBRATE MICRONIZED  134 MG CAPS:   GABAPENTIN  100 MG CAPS:   JANUVIA  100 MG TABS:   Jardiance 25 mg oral tablet: 1 tab(s) orally once a day (in the morning)  MELOXICAM  7.5 MG TABS:   metFORMIN 1000 mg oral tablet: 1 tab(s) orally 2 times a day  metoprolol succinate 50 mg oral tablet, extended release: 1 tab(s) orally once a day  PIOGLITAZONE HYDROCHLORIDE  30 MG TABS:   senna (sennosides) 8.6 mg oral tablet: 2 tab(s) orally once a day (at bedtime)  TRAMADOL HYDROCHLORIDE  50 MG TABS:   VYZULTA  0.024 % SOLN: 1 drop(s) to each affected eye once a day (at bedtime)   acetaminophen 325 mg oral tablet: 2 tab(s) orally every 6 hours As needed Temp greater or equal to 38C (100.4F), Mild Pain (1 - 3)  atorvastatin 20 mg oral tablet: 1 tab(s) orally once a day  ergocalciferol 1.25 mg (50,000 intl units) oral capsule: 1 cap(s) orally once a week  FENOFIBRATE MICRONIZED  134 MG CAPS:   GABAPENTIN  100 MG CAPS:   JANUVIA  100 MG TABS:   Jardiance 25 mg oral tablet: 1 tab(s) orally once a day (in the morning)  MELOXICAM  7.5 MG TABS:   metFORMIN 1000 mg oral tablet: 1 tab(s) orally 2 times a day  metoprolol succinate 50 mg oral tablet, extended release: 1 tab(s) orally once a day  pantoprazole 40 mg oral delayed release tablet: 1 tab(s) orally once a day (before a meal)  PIOGLITAZONE HYDROCHLORIDE  30 MG TABS:   senna (sennosides) 8.6 mg oral tablet: 2 tab(s) orally once a day (at bedtime)  TRAMADOL HYDROCHLORIDE  50 MG TABS:   VYZULTA  0.024 % SOLN: 1 drop(s) to each affected eye once a day (at bedtime)

## 2025-01-06 DIAGNOSIS — Z75.8 OTHER PROBLEMS RELATED TO MEDICAL FACILITIES AND OTHER HEALTH CARE: ICD-10-CM

## 2025-01-06 LAB
ALBUMIN SERPL ELPH-MCNC: 3.9 G/DL — SIGNIFICANT CHANGE UP (ref 3.5–5)
ALP SERPL-CCNC: 70 U/L — SIGNIFICANT CHANGE UP (ref 40–120)
ALT FLD-CCNC: 72 U/L DA — HIGH (ref 10–60)
ANION GAP SERPL CALC-SCNC: 9 MMOL/L — SIGNIFICANT CHANGE UP (ref 5–17)
AST SERPL-CCNC: 122 U/L — HIGH (ref 10–40)
BILIRUB SERPL-MCNC: 0.7 MG/DL — SIGNIFICANT CHANGE UP (ref 0.2–1.2)
BUN SERPL-MCNC: 11 MG/DL — SIGNIFICANT CHANGE UP (ref 7–18)
CALCIUM SERPL-MCNC: 9.5 MG/DL — SIGNIFICANT CHANGE UP (ref 8.4–10.5)
CHLORIDE SERPL-SCNC: 104 MMOL/L — SIGNIFICANT CHANGE UP (ref 96–108)
CO2 SERPL-SCNC: 25 MMOL/L — SIGNIFICANT CHANGE UP (ref 22–31)
CREAT SERPL-MCNC: 0.67 MG/DL — SIGNIFICANT CHANGE UP (ref 0.5–1.3)
EGFR: 91 ML/MIN/1.73M2 — SIGNIFICANT CHANGE UP
GLUCOSE BLDC GLUCOMTR-MCNC: 152 MG/DL — HIGH (ref 70–99)
GLUCOSE BLDC GLUCOMTR-MCNC: 68 MG/DL — LOW (ref 70–99)
GLUCOSE BLDC GLUCOMTR-MCNC: 77 MG/DL — SIGNIFICANT CHANGE UP (ref 70–99)
GLUCOSE BLDC GLUCOMTR-MCNC: 82 MG/DL — SIGNIFICANT CHANGE UP (ref 70–99)
GLUCOSE BLDC GLUCOMTR-MCNC: 93 MG/DL — SIGNIFICANT CHANGE UP (ref 70–99)
GLUCOSE SERPL-MCNC: 96 MG/DL — SIGNIFICANT CHANGE UP (ref 70–99)
POTASSIUM SERPL-MCNC: 4.7 MMOL/L — SIGNIFICANT CHANGE UP (ref 3.5–5.3)
POTASSIUM SERPL-SCNC: 4.7 MMOL/L — SIGNIFICANT CHANGE UP (ref 3.5–5.3)
PROT SERPL-MCNC: 7.3 G/DL — SIGNIFICANT CHANGE UP (ref 6–8.3)
SODIUM SERPL-SCNC: 138 MMOL/L — SIGNIFICANT CHANGE UP (ref 135–145)

## 2025-01-06 PROCEDURE — 99233 SBSQ HOSP IP/OBS HIGH 50: CPT

## 2025-01-06 PROCEDURE — 74183 MRI ABD W/O CNTR FLWD CNTR: CPT | Mod: 26

## 2025-01-06 RX ADMIN — ATORVASTATIN CALCIUM 20 MILLIGRAM(S): 40 TABLET, FILM COATED ORAL at 22:02

## 2025-01-06 RX ADMIN — Medication 2 MILLIGRAM(S): at 13:38

## 2025-01-06 RX ADMIN — LATANOPROST 1 DROP(S): 50 SOLUTION OPHTHALMIC at 22:09

## 2025-01-06 RX ADMIN — Medication 2 MILLIGRAM(S): at 13:08

## 2025-01-06 RX ADMIN — Medication 50 MILLIGRAM(S): at 05:32

## 2025-01-06 RX ADMIN — ENOXAPARIN SODIUM 40 MILLIGRAM(S): 60 INJECTION INTRAVENOUS; SUBCUTANEOUS at 16:43

## 2025-01-06 RX ADMIN — Medication 4 MILLIGRAM(S): at 05:49

## 2025-01-06 RX ADMIN — Medication 4 MILLIGRAM(S): at 05:32

## 2025-01-07 LAB
ALBUMIN SERPL ELPH-MCNC: 3.6 G/DL — SIGNIFICANT CHANGE UP (ref 3.5–5)
ALP SERPL-CCNC: 58 U/L — SIGNIFICANT CHANGE UP (ref 40–120)
ALT FLD-CCNC: 45 U/L DA — SIGNIFICANT CHANGE UP (ref 10–60)
ANION GAP SERPL CALC-SCNC: 8 MMOL/L — SIGNIFICANT CHANGE UP (ref 5–17)
AST SERPL-CCNC: 35 U/L — SIGNIFICANT CHANGE UP (ref 10–40)
BILIRUB SERPL-MCNC: 0.5 MG/DL — SIGNIFICANT CHANGE UP (ref 0.2–1.2)
BUN SERPL-MCNC: 9 MG/DL — SIGNIFICANT CHANGE UP (ref 7–18)
CALCIUM SERPL-MCNC: 9.4 MG/DL — SIGNIFICANT CHANGE UP (ref 8.4–10.5)
CHLORIDE SERPL-SCNC: 99 MMOL/L — SIGNIFICANT CHANGE UP (ref 96–108)
CHOLEST SERPL-MCNC: 127 MG/DL — SIGNIFICANT CHANGE UP
CO2 SERPL-SCNC: 28 MMOL/L — SIGNIFICANT CHANGE UP (ref 22–31)
CREAT SERPL-MCNC: 0.72 MG/DL — SIGNIFICANT CHANGE UP (ref 0.5–1.3)
EGFR: 87 ML/MIN/1.73M2 — SIGNIFICANT CHANGE UP
GLUCOSE BLDC GLUCOMTR-MCNC: 112 MG/DL — HIGH (ref 70–99)
GLUCOSE BLDC GLUCOMTR-MCNC: 126 MG/DL — HIGH (ref 70–99)
GLUCOSE BLDC GLUCOMTR-MCNC: 191 MG/DL — HIGH (ref 70–99)
GLUCOSE BLDC GLUCOMTR-MCNC: 199 MG/DL — HIGH (ref 70–99)
GLUCOSE BLDC GLUCOMTR-MCNC: 84 MG/DL — SIGNIFICANT CHANGE UP (ref 70–99)
GLUCOSE SERPL-MCNC: 119 MG/DL — HIGH (ref 70–99)
HCT VFR BLD CALC: 40.9 % — SIGNIFICANT CHANGE UP (ref 34.5–45)
HDLC SERPL-MCNC: 45 MG/DL — LOW
HGB BLD-MCNC: 13.8 G/DL — SIGNIFICANT CHANGE UP (ref 11.5–15.5)
LIPID PNL WITH DIRECT LDL SERPL: 58 MG/DL — SIGNIFICANT CHANGE UP
MCHC RBC-ENTMCNC: 29.4 PG — SIGNIFICANT CHANGE UP (ref 27–34)
MCHC RBC-ENTMCNC: 33.7 G/DL — SIGNIFICANT CHANGE UP (ref 32–36)
MCV RBC AUTO: 87 FL — SIGNIFICANT CHANGE UP (ref 80–100)
NON HDL CHOLESTEROL: 82 MG/DL — SIGNIFICANT CHANGE UP
NRBC # BLD: 0 /100 WBCS — SIGNIFICANT CHANGE UP (ref 0–0)
PLATELET # BLD AUTO: 186 K/UL — SIGNIFICANT CHANGE UP (ref 150–400)
POTASSIUM SERPL-MCNC: 3.8 MMOL/L — SIGNIFICANT CHANGE UP (ref 3.5–5.3)
POTASSIUM SERPL-SCNC: 3.8 MMOL/L — SIGNIFICANT CHANGE UP (ref 3.5–5.3)
PROT SERPL-MCNC: 7 G/DL — SIGNIFICANT CHANGE UP (ref 6–8.3)
RBC # BLD: 4.7 M/UL — SIGNIFICANT CHANGE UP (ref 3.8–5.2)
RBC # FLD: 14.9 % — HIGH (ref 10.3–14.5)
SODIUM SERPL-SCNC: 135 MMOL/L — SIGNIFICANT CHANGE UP (ref 135–145)
TRIGL SERPL-MCNC: 139 MG/DL — SIGNIFICANT CHANGE UP
WBC # BLD: 4.9 K/UL — SIGNIFICANT CHANGE UP (ref 3.8–10.5)
WBC # FLD AUTO: 4.9 K/UL — SIGNIFICANT CHANGE UP (ref 3.8–10.5)

## 2025-01-07 PROCEDURE — 99232 SBSQ HOSP IP/OBS MODERATE 35: CPT

## 2025-01-07 RX ORDER — SODIUM CHLORIDE 9 MG/ML
1000 INJECTION, SOLUTION INTRAVENOUS
Refills: 0 | Status: DISCONTINUED | OUTPATIENT
Start: 2025-01-07 | End: 2025-01-08

## 2025-01-07 RX ADMIN — Medication 1: at 12:03

## 2025-01-07 RX ADMIN — Medication 2 MILLIGRAM(S): at 09:16

## 2025-01-07 RX ADMIN — Medication 4 MILLIGRAM(S): at 02:32

## 2025-01-07 RX ADMIN — LATANOPROST 1 DROP(S): 50 SOLUTION OPHTHALMIC at 21:36

## 2025-01-07 RX ADMIN — Medication 2 MILLIGRAM(S): at 10:12

## 2025-01-07 RX ADMIN — Medication 4 MILLIGRAM(S): at 03:05

## 2025-01-07 RX ADMIN — ATORVASTATIN CALCIUM 20 MILLIGRAM(S): 40 TABLET, FILM COATED ORAL at 21:34

## 2025-01-07 RX ADMIN — ACETAMINOPHEN 650 MILLIGRAM(S): 80 SOLUTION/ DROPS ORAL at 22:10

## 2025-01-07 RX ADMIN — Medication 1: at 17:28

## 2025-01-07 RX ADMIN — SODIUM CHLORIDE 75 MILLILITER(S): 9 INJECTION, SOLUTION INTRAVENOUS at 06:45

## 2025-01-07 RX ADMIN — ENOXAPARIN SODIUM 40 MILLIGRAM(S): 60 INJECTION INTRAVENOUS; SUBCUTANEOUS at 17:28

## 2025-01-07 RX ADMIN — Medication 50 MILLIGRAM(S): at 05:17

## 2025-01-07 RX ADMIN — ACETAMINOPHEN 650 MILLIGRAM(S): 80 SOLUTION/ DROPS ORAL at 21:37

## 2025-01-07 NOTE — PROGRESS NOTE ADULT - NS ATTEND AMEND GEN_ALL_CORE FT
74 year old F with PMHx with T2DM, HLD, HTN, b/l cataracts, Sciatica, hx of Cholecystectomy p/w RLQ pain and right flank pain associated with nausea. Reported feeling similar pain to when she had the gallbladder pain for which she had a cholecystectomy. The pain is worse with movement and lying down. Eating food does not affect the pain, but her appetite has been less for the past 2 weeks. Nothing makes the pain better now admitted to medicine for pain control and GI eval.     On bedside eval reports pain better slightly today but still needing morphine.     Exam:  Awake, Alert, Oriented  RRR  CTA b/l  Soft, ND, + RUQ pain, + Right flank pain on palpation  +Right CVA tenderness  No edema b/l    A/P:   #Severe Intractable RUQ Abdominal Pain  #Dilated CBD  #Pancreatic Cyst  #Hepatic Steatosis  #Adrenal Nodules  #HTN, HLD, T2DM, CAD    - Differential Diagnosis includes but not limited to: musculoskeletal pain, constipation, stone. Today with elevated LFTs; CBD dilated 1.2 cm.   - Zofran prn, c/w Senna, Miralax, pain reg: IV morphine 2 and 4mg; can consider Pain Mx if not helping but pain is better today   - f/u GI recs, MRCP done on 1/6 pending results   - Insulin Sliding Scale, Blood Glucose Monitoring  - Continue home medications after med-rec  - Monitor and replete electrolytes as needed  - CT A/P report in the ER includes '1.3 cm sized cyst head of the pancreas, stable. There are 2 left adrenal nodules measuring up to 1.4 cm, stable'. Prior CT A/P from 5/23/22 report includes '1.8 x 1.1 cm pancreatic head cyst. Stable nodular thickening left adrenal gland. Normal right adrenal gland; Outpatient follow up  - dvt ppx lovenox
74 year old F with PMHx with T2DM, HLD, HTN, b/l cataracts, Sciatica, hx of Cholecystectomy p/w RLQ pain and right flank pain associated with nausea. Reported feeling similar pain to when she had the gallbladder pain for which she had a cholecystectomy. The pain is worse with movement and lying down. Eating food does not affect the pain, but her appetite has been less for the past 2 weeks. Nothing makes the pain better now admitted to medicine for pain control and GI eval.     tolerating diet, less pain, IV morphine discontinued.  SHe tolerated lunch well.  Apprehensive regarding returning home  Describes pain as exactly the same as when she had her gallbladder removed    Exam:  Awake, Alert, Oriented  RRR  CTA b/l  Soft, ND, no tenderness  No edema b/l    A/P:   #Severe Intractable RUQ Abdominal Pain  #Dilated CBD  #Pancreatic Cyst  #Hepatic Steatosis  #Adrenal Nodules  #HTN, HLD, T2DM, CAD    MRI without evidence of choledocholithiasis, psosibly passsed stone, she is tolerating diet today without pain.  DC IV narcotics and monitor. Avoid opioids  DC planning home  Stable pancreatic cyst  Stable adrenal nodules  Resume home medications, sliding scale. DC D5 fluids

## 2025-01-07 NOTE — PROGRESS NOTE ADULT - NEGATIVE GASTROINTESTINAL SYMPTOMS
no vomiting/no diarrhea/no melena/no hematochezia/no steatorrhea/no jaundice/no hiccoughs
no vomiting/no diarrhea/no melena/no hematochezia/no steatorrhea/no jaundice/no hiccoughs

## 2025-01-07 NOTE — PROGRESS NOTE ADULT - PROBLEM SELECTOR PLAN 3
h/o HLD on atorvastatin  -c/w home med  -f/u lipid profile  -calculate ASCVD risk  -DASH diet
h/o HLD on atorvastatin  -c/w home med  -f/u lipid profile  -calculate ASCVD risk  -DASH diet

## 2025-01-07 NOTE — PROGRESS NOTE ADULT - PROBLEM SELECTOR PLAN 6
Pt from home   F/u lipid profile  f/u MRCP and GI rec's
Pt from home   diet advanced f/u if tolerated

## 2025-01-07 NOTE — PROGRESS NOTE ADULT - NEGATIVE OPHTHALMOLOGIC SYMPTOMS
no diplopia/no photophobia/no lacrimation L/no lacrimation R/no blurred vision L/no blurred vision R/no discharge L/no discharge R/no pain L/no pain R/no scleral injection L/no scleral injection R
no diplopia/no photophobia/no lacrimation L/no lacrimation R/no blurred vision L/no blurred vision R/no discharge L/no discharge R/no pain L/no pain R/no scleral injection L/no scleral injection R

## 2025-01-07 NOTE — PROGRESS NOTE ADULT - REASON FOR ADMISSION
Intractable RUQ/R flank pain

## 2025-01-07 NOTE — PROGRESS NOTE ADULT - SUBJECTIVE AND OBJECTIVE BOX
Patient is a 74y old  Female who presents with a chief complaint of Intractable RUQ/R flank pain (05 Jan 2025 14:25)      INTERVAL HPI/OVERNIGHT EVENTS: no events noted overnight.    MEDICATIONS  (STANDING):  atorvastatin 20 milliGRAM(s) Oral at bedtime  enoxaparin Injectable 40 milliGRAM(s) SubCutaneous every 24 hours  insulin lispro (ADMELOG) corrective regimen sliding scale   SubCutaneous three times a day before meals  insulin lispro (ADMELOG) corrective regimen sliding scale   SubCutaneous at bedtime  latanoprost 0.005% Ophthalmic Solution 1 Drop(s) Both EYES at bedtime  metoprolol succinate ER 50 milliGRAM(s) Oral daily  naloxone Injectable 0.4 milliGRAM(s) IV Push once  polyethylene glycol 3350 17 Gram(s) Oral daily  senna 2 Tablet(s) Oral at bedtime    MEDICATIONS  (PRN):  acetaminophen     Tablet .. 650 milliGRAM(s) Oral every 6 hours PRN Temp greater or equal to 38C (100.4F), Mild Pain (1 - 3)  bisacodyl 5 milliGRAM(s) Oral daily PRN Constipation  morphine  - Injectable 2 milliGRAM(s) IV Push every 4 hours PRN Moderate Pain (4 - 6)  morphine  - Injectable 4 milliGRAM(s) IV Push every 4 hours PRN Severe Pain (7 - 10)  ondansetron Injectable 4 milliGRAM(s) IV Push every 8 hours PRN Nausea and/or Vomiting      __________________________________________________  REVIEW OF SYSTEMS:    CONSTITUTIONAL: No fever,   EYES: no acute visual disturbances  NECK: No pain or stiffness  RESPIRATORY: No cough; No shortness of breath  CARDIOVASCULAR: No chest pain, no palpitations  GASTROINTESTINAL: No pain. No nausea or vomiting; No diarrhea   NEUROLOGICAL: No headache or numbness, no tremors  MUSCULOSKELETAL: No joint pain, no muscle pain  GENITOURINARY: no dysuria, no frequency, no hesitancy  PSYCHIATRY: no depression , no anxiety  ALL OTHER  ROS negative        Vital Signs Last 24 Hrs  T(C): 36.3 (05 Jan 2025 12:55), Max: 37 (05 Jan 2025 05:39)  T(F): 97.4 (05 Jan 2025 12:55), Max: 98.6 (05 Jan 2025 05:39)  HR: 69 (05 Jan 2025 12:55) (66 - 78)  BP: 138/79 (05 Jan 2025 12:55) (137/74 - 143/75)  BP(mean): --  RR: 17 (05 Jan 2025 12:55) (17 - 18)  SpO2: 95% (05 Jan 2025 12:55) (95% - 98%)    Parameters below as of 05 Jan 2025 12:55  Patient On (Oxygen Delivery Method): room air        ________________________________________________  PHYSICAL EXAM:  See Below     _________________________________________________  LABS:                        12.7   6.07  )-----------( 200      ( 05 Jan 2025 05:35 )             38.0     01-05    140  |  108  |  17  ----------------------------<  80  3.7   |  21[L]  |  0.65    Ca    9.0      05 Jan 2025 05:35    TPro  6.9  /  Alb  3.7  /  TBili  0.5  /  DBili  x   /  AST  14  /  ALT  16  /  AlkPhos  40  01-05      Urinalysis Basic - ( 05 Jan 2025 05:35 )    Color: x / Appearance: x / SG: x / pH: x  Gluc: 80 mg/dL / Ketone: x  / Bili: x / Urobili: x   Blood: x / Protein: x / Nitrite: x   Leuk Esterase: x / RBC: x / WBC x   Sq Epi: x / Non Sq Epi: x / Bacteria: x      CAPILLARY BLOOD GLUCOSE      POCT Blood Glucose.: 94 mg/dL (05 Jan 2025 11:36)  POCT Blood Glucose.: 80 mg/dL (05 Jan 2025 08:15)        RADIOLOGY & ADDITIONAL TESTS:    Imaging Personally Reviewed:  YES    Consultant(s) Notes Reviewed:   YES    Care Discussed with Consultants : YES     Plan of care was discussed with patient and /or primary care giver; all questions and concerns were addressed and care was aligned with patient's wishes.    
NP Note discussed with  Primary Attending    Patient is a 74y old  Female who presents with a chief complaint of Intractable RUQ/R flank pain (07 Jan 2025 12:25)      INTERVAL HPI/OVERNIGHT EVENTS: no new complaints    MEDICATIONS  (STANDING):  atorvastatin 20 milliGRAM(s) Oral at bedtime  dextrose 5% + lactated ringers. 1000 milliLiter(s) (75 mL/Hr) IV Continuous <Continuous>  enoxaparin Injectable 40 milliGRAM(s) SubCutaneous every 24 hours  influenza  Vaccine (HIGH DOSE) 0.5 milliLiter(s) IntraMuscular once  insulin lispro (ADMELOG) corrective regimen sliding scale   SubCutaneous three times a day before meals  insulin lispro (ADMELOG) corrective regimen sliding scale   SubCutaneous at bedtime  latanoprost 0.005% Ophthalmic Solution 1 Drop(s) Both EYES at bedtime  metoprolol succinate ER 50 milliGRAM(s) Oral daily  naloxone Injectable 0.4 milliGRAM(s) IV Push once  polyethylene glycol 3350 17 Gram(s) Oral daily  senna 2 Tablet(s) Oral at bedtime    MEDICATIONS  (PRN):  acetaminophen     Tablet .. 650 milliGRAM(s) Oral every 6 hours PRN Temp greater or equal to 38C (100.4F), Mild Pain (1 - 3)  bisacodyl 5 milliGRAM(s) Oral daily PRN Constipation  ondansetron Injectable 4 milliGRAM(s) IV Push every 8 hours PRN Nausea and/or Vomiting      __________________________________________________  REVIEW OF SYSTEMS:    CONSTITUTIONAL: No fever,   EYES: no acute visual disturbances  NECK: No pain or stiffness  RESPIRATORY: No cough; No shortness of breath  CARDIOVASCULAR: No chest pain, no palpitations  GASTROINTESTINAL: No pain. No nausea or vomiting; No diarrhea   NEUROLOGICAL: No headache or numbness, no tremors  MUSCULOSKELETAL: No joint pain, no muscle pain  GENITOURINARY: no dysuria, no frequency, no hesitancy  PSYCHIATRY: no depression , no anxiety  ALL OTHER  ROS negative        Vital Signs Last 24 Hrs  T(C): 36.5 (07 Jan 2025 13:11), Max: 36.9 (06 Jan 2025 21:30)  T(F): 97.7 (07 Jan 2025 13:11), Max: 98.4 (06 Jan 2025 21:30)  HR: 77 (07 Jan 2025 13:11) (68 - 77)  BP: 114/97 (07 Jan 2025 13:11) (114/97 - 128/70)  BP(mean): --  RR: 16 (07 Jan 2025 13:11) (16 - 18)  SpO2: 94% (07 Jan 2025 13:11) (92% - 94%)    Parameters below as of 07 Jan 2025 13:11  Patient On (Oxygen Delivery Method): room air        ________________________________________________  PHYSICAL EXAM:  GENERAL: NAD  HEENT: Normocephalic;  conjunctivae and sclerae clear; moist mucous membranes;   NECK : supple  CHEST/LUNG: Clear to auscultation bilaterally with good air entry   HEART: S1 S2  regular; no murmurs, gallops or rubs  ABDOMEN: Soft, Nontender, Nondistended; Bowel sounds present  EXTREMITIES: no cyanosis; no edema; no calf tenderness  SKIN: warm and dry; no rash  NERVOUS SYSTEM:  Awake and alert; Oriented  to place, person and time ; no new deficits    _________________________________________________  LABS:                        13.8   4.90  )-----------( 186      ( 07 Jan 2025 08:05 )             40.9     01-07    135  |  99  |  9   ----------------------------<  119[H]  3.8   |  28  |  0.72    Ca    9.4      07 Jan 2025 08:05    TPro  7.0  /  Alb  3.6  /  TBili  0.5  /  DBili  x   /  AST  35  /  ALT  45  /  AlkPhos  58  01-07      Urinalysis Basic - ( 07 Jan 2025 08:05 )    Color: x / Appearance: x / SG: x / pH: x  Gluc: 119 mg/dL / Ketone: x  / Bili: x / Urobili: x   Blood: x / Protein: x / Nitrite: x   Leuk Esterase: x / RBC: x / WBC x   Sq Epi: x / Non Sq Epi: x / Bacteria: x      CAPILLARY BLOOD GLUCOSE      POCT Blood Glucose.: 199 mg/dL (07 Jan 2025 11:39)  POCT Blood Glucose.: 112 mg/dL (07 Jan 2025 07:53)  POCT Blood Glucose.: 84 mg/dL (07 Jan 2025 06:02)  POCT Blood Glucose.: 93 mg/dL (06 Jan 2025 21:47)  POCT Blood Glucose.: 152 mg/dL (06 Jan 2025 18:59)  POCT Blood Glucose.: 68 mg/dL (06 Jan 2025 16:58)        RADIOLOGY & ADDITIONAL TESTS:  < from: MR MRCP w/wo IV Cont (01.06.25 @ 14:10) >    ACC: [id number] EXAM:  MR MRCP [facility] IC   ORDERED BY: CHARITO SALAZAR     PROCEDURE DATE:  01/06/2025          INTERPRETATION:  CLINICAL INFORMATION: Right upper quadrant pain with   dilated CBD, history of cholecystectomy.    COMPARISON: CT and ultrasound 1/4/2025, CT 5/23/2022    CONTRAST/COMPLICATIONS:  IV Contrast: Gadavist  6.5 cc administered   1.0 cc discarded  Oral Contrast: NONE  .    PROCEDURE:  MRI of the abdomen was performed.  MRCP was performed.    FINDINGS:  LOWER CHEST: Within normal limits.    LIVER: Within normal limits.  BILE DUCTS: Mild central intrahepatic biliary duct dilatation. The common   bile duct is mildly dilated to 1.3 cm with distal tapering. No evidence   of choledocholithiasis.  GALLBLADDER: Cholecystectomy.  SPLEEN: Within normal limits.  PANCREAS: Pancreatic head cyst measures 1.7 cm, unchanged since 2022. No   main duct dilatation or suspicious enhancement. ADRENALS: Stable small   left adrenal nodules measuring up to 1.1 cm which do not have MR   characteristics of lipid Rich adenomas, unchanged since 2022.  KIDNEYS/URETERS: Within normal limits.    VISUALIZED PORTIONS:  BOWEL: Prominent periampullary duodenal diverticulum. No bowel   obstruction.  PERITONEUM: No ascites.  VESSELS: Within normal limits. Hepatic and portal veins and SMV are   patent.  RETROPERITONEUM/LYMPH NODES: No lymphadenopathy.  ABDOMINAL WALL: Within normal limits.    IMPRESSION:  Mild biliary duct dilatation after cholecystectomy. No evidence of   choledocholithiasis.    Unchanged pancreatic cyst.    --- End of Report ---            DONA ECHEVARRIA MD; Attending Radiologist  This document has been electronically signed. Jan 6 2025  6:17PM    < end of copied text >    Imaging Personally Reviewed:  YES    Consultant(s) Notes Reviewed:   YES    Plan of care was discussed with patient and /or primary care giver; all questions and concerns were addressed and care was aligned with patient's wishes.    
[   ] ICU                                          [   ] CCU                                      [ X  ] Medical Floor    Patient is a 74 year old female with abdominal pain. GI consulted to evaluate.        74 year old female with past medical history significant for HTN, DM, Hyperlipidemia cholecystitis, s/p cholecystectomy,  perforated appendicitis s/p IR drainage of abscess and interval appendectomy (8/2022) presented to the emergency room with 2 weeks history of progressively worsening intermittent 10/10 intensity, sharp RUQ abdominal pain radiating to her right flank area associated with nausea, decreased appetite, and constipation. Patient denies vomiting, hematemesis, hematochezia, melena, fever, chills, chest pain, SOB, cough, hematuria, dysuria or diarrhea.       Patient appears comfortable. No new complaints reported, No abdominal pain, N/V, hematemesis, hematochezia, melena, fever, chills, chest pain, SOB, cough or diarrhea reported.        PAST MEDICAL HISTORY     Essential hypertension    DM    Hyperlipidemia    Vitamin B12 deficiency    Vitamin D deficiency    Cataract    Cholecystitis    Seasonal allergies    Seasonal rhinitis    History of sciatica    Obesity    Appendicitis        PAST SURGICAL HISTORY    Cholecystectomy    Appendectomy    Bilateral cataract extraction         Allergies    No Known Allergies    Intolerances  None          SOCIAL HISTORY  Advanced Directives:       [ X ] Full Code       [  ] DNR  Marital Status:         [  ] M      [ X ] S      [  ] D       [  ] W  Children:       [ X ] Yes      [  ] No  Occupation:        [  ] Employed       [  X] Unemployed       [  ] Retired  Diet:       [ X ] Regular       [  ] PEG feeding          [  ] NG tube feeding  Drug Use:           [ X ] Patient denied          [  ] Yes  Alcohol:           [ X ] No             [  ] Yes (socially)         [  ] Yes (chronic)  Tobacco:           [  ] Yes           [ X ] No      FAMILY HISTORY  [ X ] Heart Disease            [ X ] Diabetes             [ X ] HTN             [  ] Colon Cancer             [  ] Stomach Cancer              [  ] Pancreatic Cancer          VITALS   Vital Signs Last 24 Hrs  T(C): 36.9 (01-06-25 @ 05:15), Max: 36.9 (01-06-25 @ 05:15)  T(F): 98.5 (01-06-25 @ 05:15), Max: 98.5 (01-06-25 @ 05:15)  HR: 69 (01-06-25 @ 05:15) (67 - 69)  BP: 137/75 (01-06-25 @ 05:15) (137/75 - 143/82)  BP(mean): 95 (01-06-25 @ 05:15) (95 - 95)  RR: 18 (01-06-25 @ 05:15) (17 - 18)  SpO2: 95% (01-06-25 @ 05:15) (95% - 95%)        MEDICATIONS  (STANDING):  atorvastatin 20 milliGRAM(s) Oral at bedtime  enoxaparin Injectable 40 milliGRAM(s) SubCutaneous every 24 hours  influenza  Vaccine (HIGH DOSE) 0.5 milliLiter(s) IntraMuscular once  insulin lispro (ADMELOG) corrective regimen sliding scale   SubCutaneous three times a day before meals  insulin lispro (ADMELOG) corrective regimen sliding scale   SubCutaneous at bedtime  latanoprost 0.005% Ophthalmic Solution 1 Drop(s) Both EYES at bedtime  metoprolol succinate ER 50 milliGRAM(s) Oral daily  naloxone Injectable 0.4 milliGRAM(s) IV Push once  polyethylene glycol 3350 17 Gram(s) Oral daily  senna 2 Tablet(s) Oral at bedtime    MEDICATIONS  (PRN):  acetaminophen     Tablet .. 650 milliGRAM(s) Oral every 6 hours PRN Temp greater or equal to 38C (100.4F), Mild Pain (1 - 3)  bisacodyl 5 milliGRAM(s) Oral daily PRN Constipation  morphine  - Injectable 2 milliGRAM(s) IV Push every 4 hours PRN Moderate Pain (4 - 6)  morphine  - Injectable 4 milliGRAM(s) IV Push every 4 hours PRN Severe Pain (7 - 10)  ondansetron Injectable 4 milliGRAM(s) IV Push every 8 hours PRN Nausea and/or Vomiting                            12.7   6.07  )-----------( 200      ( 05 Jan 2025 05:35 )             38.0       01-06    138  |  104  |  11  ----------------------------<  96  4.7   |  25  |  0.67    Ca    9.5      06 Jan 2025 07:35    TPro  7.3  /  Alb  3.9  /  TBili  0.7  /  DBili  x   /  AST  122[H]  /  ALT  72[H]  /  AlkPhos  70  01-06      
NP Note discussed with  Primary Attending    Patient is a 74y old  Female who presents with a chief complaint of Intractable RUQ/R flank pain (05 Jan 2025 16:06)      INTERVAL HPI/OVERNIGHT EVENTS: no new complaints    MEDICATIONS  (STANDING):  atorvastatin 20 milliGRAM(s) Oral at bedtime  enoxaparin Injectable 40 milliGRAM(s) SubCutaneous every 24 hours  influenza  Vaccine (HIGH DOSE) 0.5 milliLiter(s) IntraMuscular once  insulin lispro (ADMELOG) corrective regimen sliding scale   SubCutaneous three times a day before meals  insulin lispro (ADMELOG) corrective regimen sliding scale   SubCutaneous at bedtime  latanoprost 0.005% Ophthalmic Solution 1 Drop(s) Both EYES at bedtime  metoprolol succinate ER 50 milliGRAM(s) Oral daily  naloxone Injectable 0.4 milliGRAM(s) IV Push once  polyethylene glycol 3350 17 Gram(s) Oral daily  senna 2 Tablet(s) Oral at bedtime    MEDICATIONS  (PRN):  acetaminophen     Tablet .. 650 milliGRAM(s) Oral every 6 hours PRN Temp greater or equal to 38C (100.4F), Mild Pain (1 - 3)  bisacodyl 5 milliGRAM(s) Oral daily PRN Constipation  morphine  - Injectable 2 milliGRAM(s) IV Push every 4 hours PRN Moderate Pain (4 - 6)  morphine  - Injectable 4 milliGRAM(s) IV Push every 4 hours PRN Severe Pain (7 - 10)  ondansetron Injectable 4 milliGRAM(s) IV Push every 8 hours PRN Nausea and/or Vomiting      __________________________________________________  REVIEW OF SYSTEMS:    CONSTITUTIONAL: No fever,   EYES: no acute visual disturbances  NECK: No pain or stiffness  RESPIRATORY: No cough; No shortness of breath  CARDIOVASCULAR: No chest pain, no palpitations  GASTROINTESTINAL: No pain. No nausea or vomiting; No diarrhea   NEUROLOGICAL: No headache or numbness, no tremors  MUSCULOSKELETAL: No joint pain, no muscle pain  GENITOURINARY: no dysuria, no frequency, no hesitancy  PSYCHIATRY: no depression , no anxiety  ALL OTHER  ROS negative        Vital Signs Last 24 Hrs  T(C): 36.9 (06 Jan 2025 05:15), Max: 36.9 (06 Jan 2025 05:15)  T(F): 98.5 (06 Jan 2025 05:15), Max: 98.5 (06 Jan 2025 05:15)  HR: 69 (06 Jan 2025 05:15) (67 - 69)  BP: 137/75 (06 Jan 2025 05:15) (137/75 - 143/82)  BP(mean): 95 (06 Jan 2025 05:15) (95 - 95)  RR: 18 (06 Jan 2025 05:15) (17 - 18)  SpO2: 95% (06 Jan 2025 05:15) (95% - 95%)    Parameters below as of 06 Jan 2025 05:15  Patient On (Oxygen Delivery Method): room air        ________________________________________________  PHYSICAL EXAM:  GENERAL: NAD  HEENT: Normocephalic;  conjunctivae and sclerae clear; moist mucous membranes;   NECK : supple  CHEST/LUNG: Clear to auscultation bilaterally with good air entry   HEART: S1 S2  regular; no murmurs, gallops or rubs  ABDOMEN: Soft, Nontender, Nondistended; Bowel sounds present  EXTREMITIES: no cyanosis; no edema; no calf tenderness  SKIN: warm and dry; no rash  NERVOUS SYSTEM:  Awake and alert; Oriented  to place, person and time ; no new deficits    _________________________________________________  LABS:                        12.7   6.07  )-----------( 200      ( 05 Jan 2025 05:35 )             38.0     01-06    138  |  104  |  11  ----------------------------<  96  4.7   |  25  |  0.67    Ca    9.5      06 Jan 2025 07:35    TPro  7.3  /  Alb  3.9  /  TBili  0.7  /  DBili  x   /  AST  122[H]  /  ALT  72[H]  /  AlkPhos  70  01-06      Urinalysis Basic - ( 06 Jan 2025 07:35 )    Color: x / Appearance: x / SG: x / pH: x  Gluc: 96 mg/dL / Ketone: x  / Bili: x / Urobili: x   Blood: x / Protein: x / Nitrite: x   Leuk Esterase: x / RBC: x / WBC x   Sq Epi: x / Non Sq Epi: x / Bacteria: x      CAPILLARY BLOOD GLUCOSE      POCT Blood Glucose.: 82 mg/dL (06 Jan 2025 08:20)  POCT Blood Glucose.: 98 mg/dL (05 Jan 2025 22:15)  POCT Blood Glucose.: 92 mg/dL (05 Jan 2025 16:46)  POCT Blood Glucose.: 94 mg/dL (05 Jan 2025 11:36)        RADIOLOGY & ADDITIONAL TESTS:  < from: CT Abdomen and Pelvis w/ IV Cont (01.04.25 @ 15:35) >    ACC: [id number] EXAM:  CT ABDOMEN AND PELVIS IC   ORDERED BY: NEFTALI BAUMAN     PROCEDURE DATE:  01/04/2025          INTERPRETATION:  CLINICAL INFORMATION: Right upper quadrant pain.    COMPARISON: 06/19/2022.    CONTRAST/COMPLICATIONS:  IV Contrast: Omnipaque 350  90 cc administered   10 cc discarded  Oral Contrast: NONE  .    PROCEDURE:  CT of the Abdomen and Pelvis was performed.  Sagittal and coronal reformats were performed.    FINDINGS:  LOWER CHEST: Mosaic attenuation and a possible fibrotic lung disease,   similar to the prior study. Subsegmental atelectasis/scarring lingular   segment. Atherosclerotic calcification including the coronary arteries.    LIVER: Within normal limits.  BILE DUCTS: Dilated CBD measuring 1.2 cm, increasedin comparison to the   prior study. It may be related to prior cholecystectomy, however, in view   of clinical history, consider MRCP  for further evaluation.  GALLBLADDER: Cholecystectomy.  SPLEEN: Within normal limits.  PANCREAS: 1.3 cm sized cyst head of the pancreas, stable.  ADRENALS: There are 2 left adrenal nodules measuring up to 1.4 cm, stable.  KIDNEYS/URETERS: No hydronephrosis. Excreted contrast within the calyces   limiting the evaluation of previously noted nonobstructing calculi.    BLADDER: Within normal limits.  REPRODUCTIVE ORGANS: Uterus and adnexa within normal limits.    BOWEL: No bowel obstruction. Appendix is absent. Multifocal scattered   uncomplicated colonic uncomplicated diverticula. Uncomplicated duodenal   diverticulum.  PERITONEUM/RETROPERITONEUM: Within normal limits.  VESSELS: Atherosclerotic changes.  LYMPH NODES: No lymphadenopathy.  ABDOMINAL WALL: Within normal limits.  BONES: Degenerative changes.    IMPRESSION:  Dilated CBD demonstrating interval progression in comparison to the prior   study as described above.    Additional findings as above.        --- End of Report ---            NICKI BOWDEN MD; Attending Radiologist  This document has been electronically signed. Jan 4 2025  4:04PM    < end of copied text >    Imaging Personally Reviewed:  YES    Consultant(s) Notes Reviewed:   YES    Plan of care was discussed with patient and /or primary care giver; all questions and concerns were addressed and care was aligned with patient's wishes.    
[   ] ICU                                          [   ] CCU                                      [ X ] Medical Floor      Patient is a 74 year old female with abdominal pain. GI consulted to evaluate.        74 year old female with past medical history significant for HTN, DM, Hyperlipidemia cholecystitis, s/p cholecystectomy,  perforated appendicitis s/p IR drainage of abscess and interval appendectomy (8/2022) presented to the emergency room with 2 weeks history of progressively worsening intermittent 10/10 intensity, sharp RUQ abdominal pain radiating to her right flank area associated with nausea, decreased appetite, and constipation. Patient denies vomiting, hematemesis, hematochezia, melena, fever, chills, chest pain, SOB, cough, hematuria, dysuria or diarrhea.       Patient appears comfortable. No new complaints reported, No abdominal pain, N/V, hematemesis, hematochezia, melena, fever, chills, chest pain, SOB, cough or diarrhea reported.        PAST MEDICAL HISTORY     Essential hypertension    DM    Hyperlipidemia    Vitamin B12 deficiency    Vitamin D deficiency    Cataract    Cholecystitis    Seasonal allergies    Seasonal rhinitis    History of sciatica    Obesity    Appendicitis        PAST SURGICAL HISTORY    Cholecystectomy    Appendectomy    Bilateral cataract extraction         Allergies    No Known Allergies    Intolerances  None          SOCIAL HISTORY  Advanced Directives:       [ X ] Full Code       [  ] DNR  Marital Status:         [  ] M      [ X ] S      [  ] D       [  ] W  Children:       [ X ] Yes      [  ] No  Occupation:        [  ] Employed       [  X] Unemployed       [  ] Retired  Diet:       [ X ] Regular       [  ] PEG feeding          [  ] NG tube feeding  Drug Use:           [ X ] Patient denied          [  ] Yes  Alcohol:           [ X ] No             [  ] Yes (socially)         [  ] Yes (chronic)  Tobacco:           [  ] Yes           [ X ] No      FAMILY HISTORY  [ X ] Heart Disease            [ X ] Diabetes             [ X ] HTN             [  ] Colon Cancer             [  ] Stomach Cancer              [  ] Pancreatic Cancer      VITALS   Vital Signs Last 24 Hrs  T(C): 36.6 (01-07-25 @ 04:50), Max: 37.6 (01-06-25 @ 14:17)  T(F): 97.8 (01-07-25 @ 04:50), Max: 99.6 (01-06-25 @ 14:17)  HR: 68 (01-07-25 @ 04:50) (68 - 75)  BP: 128/70 (01-07-25 @ 04:50) (117/59 - 133/68)   RR: 18 (01-07-25 @ 04:50) (15 - 18)  SpO2: 94% (01-07-25 @ 04:50) (92% - 95%)        MEDICATIONS  (STANDING):  atorvastatin 20 milliGRAM(s) Oral at bedtime  dextrose 5% + lactated ringers. 1000 milliLiter(s) (75 mL/Hr) IV Continuous <Continuous>  enoxaparin Injectable 40 milliGRAM(s) SubCutaneous every 24 hours  influenza  Vaccine (HIGH DOSE) 0.5 milliLiter(s) IntraMuscular once  insulin lispro (ADMELOG) corrective regimen sliding scale   SubCutaneous three times a day before meals  insulin lispro (ADMELOG) corrective regimen sliding scale   SubCutaneous at bedtime  latanoprost 0.005% Ophthalmic Solution 1 Drop(s) Both EYES at bedtime  metoprolol succinate ER 50 milliGRAM(s) Oral daily  naloxone Injectable 0.4 milliGRAM(s) IV Push once  polyethylene glycol 3350 17 Gram(s) Oral daily  senna 2 Tablet(s) Oral at bedtime    MEDICATIONS  (PRN):  acetaminophen     Tablet .. 650 milliGRAM(s) Oral every 6 hours PRN Temp greater or equal to 38C (100.4F), Mild Pain (1 - 3)  bisacodyl 5 milliGRAM(s) Oral daily PRN Constipation  morphine  - Injectable 2 milliGRAM(s) IV Push every 4 hours PRN Moderate Pain (4 - 6)  morphine  - Injectable 4 milliGRAM(s) IV Push every 4 hours PRN Severe Pain (7 - 10)  ondansetron Injectable 4 milliGRAM(s) IV Push every 8 hours PRN Nausea and/or Vomiting                            13.8   4.90  )-----------( 186      ( 07 Jan 2025 08:05 )             40.9       01-07    135  |  99  |  9   ----------------------------<  119[H]  3.8   |  28  |  0.72    Ca    9.4      07 Jan 2025 08:05    TPro  7.0  /  Alb  3.6  /  TBili  0.5  /  DBili  x   /  AST  35  /  ALT  45  /  AlkPhos  58  01-07

## 2025-01-07 NOTE — PROGRESS NOTE ADULT - NEGATIVE NEUROLOGICAL SYMPTOMS
no focal seizures/no syncope/no tremors/no vertigo/no loss of sensation/no headache/no loss of consciousness/no confusion
no focal seizures/no syncope/no tremors/no vertigo/no loss of sensation/no headache/no loss of consciousness/no confusion

## 2025-01-07 NOTE — PROGRESS NOTE ADULT - NEGATIVE ENDOCRINE SYMPTOMS
no cold intolerance/no heat intolerance/no striae/no voice change/no hirsutism
no cold intolerance/no heat intolerance/no striae/no voice change/no hirsutism

## 2025-01-07 NOTE — PROGRESS NOTE ADULT - PROBLEM SELECTOR PLAN 4
h/o DM on trulicity, pioglitazone, metformin, jardiance, and januvia per surescripts  - hold oral dm meds  -A1c 6.8   -c/w low sliding scale  -Adjust insulin as indicated  -FS ACHS
h/o DM on trulicity, pioglitazone, metformin, jardiance, and januvia per surescripts  - hold oral dm meds  -A1c 6.8   -c/w low sliding scale  -Adjust insulin as indicated  -FS ACHS

## 2025-01-07 NOTE — PROGRESS NOTE ADULT - PROBLEM SELECTOR PLAN 2
h/o HTN on metoprolol ER 50mg qd at home  -c/w home meds   -monitor BP  -adjust antihypertensive meds as appropriate
h/o HTN on metoprolol ER 50mg qd at home  -c/w home meds   -monitor BP  -adjust antihypertensive meds as appropriate

## 2025-01-07 NOTE — PROGRESS NOTE ADULT - ASSESSMENT
74y F with PMH of HTN, DM, HLD, Cholecystis s/p cholecystectomy,  perforated appendicitis s/p IR drainage of abscess and interval appendectomy (8/2022) presenting with right sided abdominal and flank pain. Admitted to medicine for intractable RUQ pain with evidence of dilated CBD on CT imaging. Admitted for pain control and likely MRCP for further evaluation of dilated CBD.      
74 year old F with PMHx with T2DM, HLD, HTN, b/l cataracts, Sciatica, hx of Cholecystectomy p/w RLQ pain and right flank pain associated with nausea. Reported feeling similar pain to when she had the gallbladder pain for which she had a cholecystectomy. The pain is worse with movement and lying down. Eating food does not affect the pain, but her appetite has been less for the past 2 weeks. Nothing makes the pain better now admitted to medicine for pain control and GI eval.     On bedside eval reports pain is still ongoing and morphine doesn't always help. Denies N/V.     Exam:  Awake, Alert, Oriented  RRR  CTA b/l  Soft, ND, + RUQ pain, + Right flank pain on palpation  +Right CVA tenderness  No edema b/l    A/P:   #Severe Intractable RUQ Abdominal Pain  #Dilated CBD  #Pancreatic Cyst  #Hepatic Steatosis  #Adrenal Nodules  #HTN, HLD, T2DM, CAD    - Differential Diagnosis includes but not limited to: musculoskeletal pain, constipation. CBD stone less likely given lack of LFT elevation  - CBD 1.2 cm. LFTs within normal limits. Discussed with ER; patient stable to wait until Monday if GI evaluation is needed  - Zofran prn, c/w Senna, Miralax  - Tylenol prn for mild pain, IV Morphine 2mg prn for moderate pain, IV Morphine 4mg prn for severe pain, Pain Mx Cx on Monday   - CT A/P report in the ER includes '1.3 cm sized cyst head of the pancreas, stable. There are 2 left adrenal nodules measuring up to 1.4 cm, stable'. Prior CT A/P from 5/23/22 report includes '1.8 x 1.1 cm pancreatic head cyst. Stable nodular thickening left adrenal gland. Normal right adrenal gland; Outpatient follow up  - f/u GI recs, MRCP?  - Insulin Sliding Scale, Blood Glucose Monitoring  - Continue home medications after med-rec  - DVT PPx = Lovenox  - Monitor and replete electrolytes as needed  
74y F with PMH of HTN, DM, HLD, Cholecystis s/p cholecystectomy,  perforated appendicitis s/p IR drainage of abscess and interval appendectomy (8/2022) presenting with right sided abdominal and flank pain. Admitted to medicine for intractable RUQ pain with evidence of dilated CBD on CT imaging.   Admitted for pain control and likely MRCP for further evaluation of dilated CBD.    MRCP shows Mild biliary duct dilatation after cholecystectomy. No evidence of choledocholithiasis. Pancreatic cyst  measures 1.7 cm, unchanged since 2022. No main duct dilatation or suspicious enhancement. ADRENALS: Stable small left adrenal nodules measuring up to 1.1 cm which do not have MR characteristics of lipid Rich adenomas, unchanged since 2022.  Pt diet advanced Can be d/c if able to tolerate     
1. Abdominal pain (etiology is not clear)  2. Dilated CBD most likely due to cholecystectomy  3. No evidence of biliary obstruction (LFT's normal)  4. R/o Peptic ulcer disease  5. Diverticulosis with out diverticulitis  6. Duodenal diverticulum  7. Pancreatic head cyst (unchanged)  8. Hepatic steatosis    Suggestions:    1. MRCP (awaiting)  2. Protonix 40mg daily  3. Avoid NSAID  4. Pain control  5. Diet as tolerated  6. Zofran PRN  7. Follow up LFT's  7. DVT prophylaxis  
1. Abdominal pain (etiology is not clear)  2. Dilated CBD most likely due to cholecystectomy  3. No evidence of biliary obstruction (LFT's normal)  4. R/o Peptic ulcer disease  5. Diverticulosis with out diverticulitis  6. Duodenal diverticulum  7. Pancreatic head cyst (unchanged)  8. Hepatic steatosis    Suggestions:    1. MRCP (awaiting)  2. Protonix 40mg daily  3. Avoid NSAID  4. Pain control  5. Diet as tolerated  6. Zofran PRN  7. Follow up LFT's  7. DVT prophylaxis

## 2025-01-07 NOTE — PROGRESS NOTE ADULT - PROBLEM SELECTOR PLAN 1
p/w 2 weeks of RUQ and R flank pain, initially waxing and waning, now constant for the last 2 days  -hx of cholecystitis s/p cholecystectomy and appendicitis s/p appendectomy  -CT A/P shows Dilated CBD demonstrating interval progression in comparison to the prior study (2022). "It may be related to prior cholecystectomy, however, in view of clinical history, consider MRCP  for further evaluation". 3 cm sized cyst head of the pancreas, stable  -Hepatic function panel WNL  -f/u MRCP  -GI following Dr. Pena   -pain control: tylenol 650mg PO q6 PRN (mild), morphine 2mg/4mg IV q4 PRN (moderate/severe)  -per prior surgery notes indicate patient required lysis of adhesions in 2022-->CT shows no bowel obstruction however no PO contrast-->consider surgery consult if MRCP unremarkable as current pain possibly related to adhesions?
p/w 2 weeks of RUQ and R flank pain, initially waxing and waning, now constant for the last 2 days  -hx of cholecystitis s/p cholecystectomy and appendicitis s/p appendectomy  -CT A/P shows Dilated CBD demonstrating interval progression in comparison to the prior study (2022). "It may be related to prior cholecystectomy, however, in view of clinical history, consider MRCP  for further evaluation". 3 cm sized cyst head of the pancreas, stable  -Hepatic function panel WNL  -MRCP unremarkable and unchanged   -GI following Dr. Pena   -pain control: tylenol 650mg PO q6 PRN (mild), morphine 2mg/4mg IV q4 PRN (moderate/severe)  -per prior surgery notes indicate patient required lysis of adhesions in 2022-->CT shows no bowel obstruction however no PO contrast-->consider surgery consult if MRCP unremarkable as current pain possibly related to adhesions?

## 2025-01-07 NOTE — PROGRESS NOTE ADULT - TIME BILLING
Time spent includes direct patient care  (interview and examination of patient), discussion with other providers, support staff and/or patient's family members, review of medical records, ordering diagnostic tests and analyzing results, and documentation excluding time spent doing teaching and procedures.
Time spent includes direct patient care  (interview and examination of patient), discussion with other providers, support staff and/or patient's family members, review of medical records, ordering diagnostic tests and analyzing results, and documentation excluding time spent doing teaching and procedures.
coordination of care, medical management, documentation of encounter

## 2025-01-08 ENCOUNTER — TRANSCRIPTION ENCOUNTER (OUTPATIENT)
Age: 75
End: 2025-01-08

## 2025-01-08 VITALS
DIASTOLIC BLOOD PRESSURE: 77 MMHG | TEMPERATURE: 98 F | RESPIRATION RATE: 18 BRPM | HEART RATE: 75 BPM | SYSTOLIC BLOOD PRESSURE: 126 MMHG | OXYGEN SATURATION: 96 %

## 2025-01-08 LAB
ALBUMIN SERPL ELPH-MCNC: 3.8 G/DL — SIGNIFICANT CHANGE UP (ref 3.5–5)
ALP SERPL-CCNC: 69 U/L — SIGNIFICANT CHANGE UP (ref 40–120)
ALT FLD-CCNC: 37 U/L DA — SIGNIFICANT CHANGE UP (ref 10–60)
ANION GAP SERPL CALC-SCNC: 10 MMOL/L — SIGNIFICANT CHANGE UP (ref 5–17)
AST SERPL-CCNC: 35 U/L — SIGNIFICANT CHANGE UP (ref 10–40)
BILIRUB SERPL-MCNC: 0.5 MG/DL — SIGNIFICANT CHANGE UP (ref 0.2–1.2)
BUN SERPL-MCNC: 10 MG/DL — SIGNIFICANT CHANGE UP (ref 7–18)
CALCIUM SERPL-MCNC: 9.9 MG/DL — SIGNIFICANT CHANGE UP (ref 8.4–10.5)
CHLORIDE SERPL-SCNC: 104 MMOL/L — SIGNIFICANT CHANGE UP (ref 96–108)
CO2 SERPL-SCNC: 25 MMOL/L — SIGNIFICANT CHANGE UP (ref 22–31)
CREAT SERPL-MCNC: 0.69 MG/DL — SIGNIFICANT CHANGE UP (ref 0.5–1.3)
EGFR: 90 ML/MIN/1.73M2 — SIGNIFICANT CHANGE UP
GLUCOSE BLDC GLUCOMTR-MCNC: 128 MG/DL — HIGH (ref 70–99)
GLUCOSE BLDC GLUCOMTR-MCNC: 159 MG/DL — HIGH (ref 70–99)
GLUCOSE SERPL-MCNC: 148 MG/DL — HIGH (ref 70–99)
HCT VFR BLD CALC: 40.6 % — SIGNIFICANT CHANGE UP (ref 34.5–45)
HGB BLD-MCNC: 13.7 G/DL — SIGNIFICANT CHANGE UP (ref 11.5–15.5)
MCHC RBC-ENTMCNC: 29.3 PG — SIGNIFICANT CHANGE UP (ref 27–34)
MCHC RBC-ENTMCNC: 33.7 G/DL — SIGNIFICANT CHANGE UP (ref 32–36)
MCV RBC AUTO: 86.9 FL — SIGNIFICANT CHANGE UP (ref 80–100)
NRBC # BLD: 0 /100 WBCS — SIGNIFICANT CHANGE UP (ref 0–0)
PLATELET # BLD AUTO: 209 K/UL — SIGNIFICANT CHANGE UP (ref 150–400)
POTASSIUM SERPL-MCNC: 3.9 MMOL/L — SIGNIFICANT CHANGE UP (ref 3.5–5.3)
POTASSIUM SERPL-SCNC: 3.9 MMOL/L — SIGNIFICANT CHANGE UP (ref 3.5–5.3)
PROT SERPL-MCNC: 7.5 G/DL — SIGNIFICANT CHANGE UP (ref 6–8.3)
RBC # BLD: 4.67 M/UL — SIGNIFICANT CHANGE UP (ref 3.8–5.2)
RBC # FLD: 14.9 % — HIGH (ref 10.3–14.5)
SODIUM SERPL-SCNC: 139 MMOL/L — SIGNIFICANT CHANGE UP (ref 135–145)
WBC # BLD: 4.86 K/UL — SIGNIFICANT CHANGE UP (ref 3.8–10.5)
WBC # FLD AUTO: 4.86 K/UL — SIGNIFICANT CHANGE UP (ref 3.8–10.5)

## 2025-01-08 PROCEDURE — 80061 LIPID PANEL: CPT

## 2025-01-08 PROCEDURE — 74183 MRI ABD W/O CNTR FLWD CNTR: CPT | Mod: MC

## 2025-01-08 PROCEDURE — 96375 TX/PRO/DX INJ NEW DRUG ADDON: CPT

## 2025-01-08 PROCEDURE — 99239 HOSP IP/OBS DSCHRG MGMT >30: CPT

## 2025-01-08 PROCEDURE — 85025 COMPLETE CBC W/AUTO DIFF WBC: CPT

## 2025-01-08 PROCEDURE — 84484 ASSAY OF TROPONIN QUANT: CPT

## 2025-01-08 PROCEDURE — 80053 COMPREHEN METABOLIC PANEL: CPT

## 2025-01-08 PROCEDURE — 96374 THER/PROPH/DIAG INJ IV PUSH: CPT

## 2025-01-08 PROCEDURE — 74177 CT ABD & PELVIS W/CONTRAST: CPT | Mod: MC

## 2025-01-08 PROCEDURE — 76705 ECHO EXAM OF ABDOMEN: CPT

## 2025-01-08 PROCEDURE — 81003 URINALYSIS AUTO W/O SCOPE: CPT

## 2025-01-08 PROCEDURE — 82962 GLUCOSE BLOOD TEST: CPT

## 2025-01-08 PROCEDURE — 99285 EMERGENCY DEPT VISIT HI MDM: CPT | Mod: 25

## 2025-01-08 PROCEDURE — 96376 TX/PRO/DX INJ SAME DRUG ADON: CPT

## 2025-01-08 PROCEDURE — A9585: CPT

## 2025-01-08 PROCEDURE — 83690 ASSAY OF LIPASE: CPT

## 2025-01-08 PROCEDURE — 36415 COLL VENOUS BLD VENIPUNCTURE: CPT

## 2025-01-08 PROCEDURE — 83036 HEMOGLOBIN GLYCOSYLATED A1C: CPT

## 2025-01-08 PROCEDURE — 85027 COMPLETE CBC AUTOMATED: CPT

## 2025-01-08 RX ORDER — PANTOPRAZOLE 40 MG/1
1 TABLET, DELAYED RELEASE ORAL
Qty: 30 | Refills: 0
Start: 2025-01-08 | End: 2025-02-06

## 2025-01-08 RX ORDER — ACETAMINOPHEN 80 MG/.8ML
2 SOLUTION/ DROPS ORAL
Qty: 0 | Refills: 0 | DISCHARGE
Start: 2025-01-08

## 2025-01-08 RX ORDER — PANTOPRAZOLE 40 MG/1
40 TABLET, DELAYED RELEASE ORAL
Refills: 0 | Status: DISCONTINUED | OUTPATIENT
Start: 2025-01-08 | End: 2025-01-08

## 2025-01-08 RX ADMIN — Medication 17 GRAM(S): at 11:43

## 2025-01-08 RX ADMIN — Medication 4 MILLIGRAM(S): at 07:52

## 2025-01-08 RX ADMIN — Medication 1: at 11:42

## 2025-01-08 RX ADMIN — ACETAMINOPHEN 650 MILLIGRAM(S): 80 SOLUTION/ DROPS ORAL at 05:21

## 2025-01-08 RX ADMIN — ACETAMINOPHEN 650 MILLIGRAM(S): 80 SOLUTION/ DROPS ORAL at 05:55

## 2025-01-08 RX ADMIN — Medication 50 MILLIGRAM(S): at 05:22

## 2025-01-08 NOTE — DISCHARGE NOTE NURSING/CASE MANAGEMENT/SOCIAL WORK - FINANCIAL ASSISTANCE
[facility] provides services at a reduced cost to those who are determined to be eligible through [facility]’s financial assistance program. Information regarding [facility]’s financial assistance program can be found by going to [email or web address] or by calling [phone].

## 2025-01-08 NOTE — DISCHARGE NOTE NURSING/CASE MANAGEMENT/SOCIAL WORK - PATIENT PORTAL LINK FT
You can access the FollowMyHealth Patient Portal offered by [facility] by registering at the following website: [email or web address]. By joining [employer]’s FollowMyHealth portal, you will also be able to view your health information using other applications (apps) compatible with our system.

## 2025-01-08 NOTE — DISCHARGE NOTE NURSING/CASE MANAGEMENT/SOCIAL WORK - NSDCPEFALRISK_GEN_ALL_CORE
For information on Fall & Injury Prevention, visit: [email or web address] OR  [email or web address] OR  https://www.cdc.gov/steadi/patient.html

## 2025-01-08 NOTE — CHART NOTE - NSCHARTNOTEFT_GEN_A_CORE
attempted to do med recc for patient, however pharmacy is closed.  will endorse to day NP 1/6/25 to f/u and complete     [facility] pharmacy [phone]
Pt w/ preformed MRCP without results. NPO midnight tentative reads.
Spoke with patient's PCP, Dr. hernández directly. I informed him of patients hospitalization and negative MRI.  all questions answered, all concerns addressed

## 2025-02-12 NOTE — PATIENT PROFILE ADULT - TOBACCO USE
Meets criteria for discharge.  Discharge instructions reviewed with pt and pt's designated responsible party.  Pt label on prescription bag from pharmacy matched to pt's wristband. Pharmacy bag opened with 1 prescriptions inside. Medications were reviewed to match pt wristband while pt and significant other agreed with identification. Prescriptions placed back in pharmacy bag resealed with tape and handed to vickey Koenig per pt request.   
Pt discharged with daughter in stable condition. Pt and daughter unaware that pt must be with someone for 24 hours after anesthesia. Education provided regarding safety; pt and daughter to arrange someone to be with pt tonight.   
Never smoker

## (undated) DEVICE — FOR-ESU VALLEYLAB T7E14999DX: Type: DURABLE MEDICAL EQUIPMENT

## (undated) DEVICE — PACK GENERAL LAPAROSCOPY

## (undated) DEVICE — BLANKET WARMER UPPER ADULT

## (undated) DEVICE — LIGASURE MARYLAND JAW LAPAROSCOPIC SEALER 5MM-44CM

## (undated) DEVICE — ENDOCATCH 10MM SPECIMEN POUCH

## (undated) DEVICE — NDL HYPO REGULAR BEVEL 25G X 1.5"

## (undated) DEVICE — SUT BIOSYN 4-0 18" P-12

## (undated) DEVICE — GLV 6.5 ESTEEM BLUE

## (undated) DEVICE — TUBING STRYKER PNEUMOSURE HEATED RTP

## (undated) DEVICE — APPLICATOR DUPLOSPRAY 40CM DISP

## (undated) DEVICE — TROCAR XCEL ENDOPATH BLUNT 12X100MM

## (undated) DEVICE — TROCAR COVIDIEN VERSAPORT OPTICAL BLADELESS 15MM STD

## (undated) DEVICE — GLV 6.5 PROTEXIS

## (undated) DEVICE — Device

## (undated) DEVICE — TUBING STRYKEFLOW II SUCTION / IRRIGATOR

## (undated) DEVICE — DRAIN RESERVOIR FOR JACKSON PRATT 100CC CARDINAL

## (undated) DEVICE — DRSG MASTISOL

## (undated) DEVICE — STAPLER COVIDIEN ENDO GIA STANDARD HANDLE

## (undated) DEVICE — DRSG STERISTRIPS 0.5X4"

## (undated) DEVICE — SPONGE ENDO PEANUT 5MM

## (undated) DEVICE — SOL IRR POUR NS 0.9% 1500ML

## (undated) DEVICE — GLV 8 PROTEXIS

## (undated) DEVICE — WRAP COMPRESSION CALF MED

## (undated) DEVICE — ENDOCATCH II 15MM

## (undated) DEVICE — ELCTR GROUNDING PAD ADULT COVIDIEN

## (undated) DEVICE — SUT POLYSORB 0 30" GU-46

## (undated) DEVICE — DRSG 2X2

## (undated) DEVICE — D HELP - CLEARVIEW CLEARIFY SYSTEM

## (undated) DEVICE — TROCAR COVIDIEN VERSAONE OPTICAL BLADELESS 5MM

## (undated) DEVICE — GLV 8 ESTEEM BLUE